# Patient Record
Sex: MALE | Race: BLACK OR AFRICAN AMERICAN | Employment: PART TIME | URBAN - METROPOLITAN AREA
[De-identification: names, ages, dates, MRNs, and addresses within clinical notes are randomized per-mention and may not be internally consistent; named-entity substitution may affect disease eponyms.]

---

## 2018-09-30 ENCOUNTER — APPOINTMENT (OUTPATIENT)
Dept: GENERAL RADIOLOGY | Age: 38
End: 2018-09-30
Attending: EMERGENCY MEDICINE
Payer: SUBSIDIZED

## 2018-09-30 ENCOUNTER — HOSPITAL ENCOUNTER (EMERGENCY)
Age: 38
Discharge: HOME | End: 2018-09-30
Attending: EMERGENCY MEDICINE
Payer: SUBSIDIZED

## 2018-09-30 VITALS
HEIGHT: 76 IN | DIASTOLIC BLOOD PRESSURE: 94 MMHG | OXYGEN SATURATION: 99 % | BODY MASS INDEX: 29.22 KG/M2 | TEMPERATURE: 98.3 F | WEIGHT: 240 LBS | SYSTOLIC BLOOD PRESSURE: 159 MMHG | RESPIRATION RATE: 20 BRPM | HEART RATE: 94 BPM

## 2018-09-30 DIAGNOSIS — R53.83 MALAISE AND FATIGUE: ICD-10-CM

## 2018-09-30 DIAGNOSIS — E86.0 DEHYDRATION: Primary | ICD-10-CM

## 2018-09-30 DIAGNOSIS — R53.81 MALAISE AND FATIGUE: ICD-10-CM

## 2018-09-30 LAB
ALBUMIN SERPL-MCNC: 3.5 G/DL (ref 3.5–5)
ALBUMIN/GLOB SERPL: 0.8 {RATIO} (ref 1.1–2.2)
ALP SERPL-CCNC: 104 U/L (ref 45–117)
ALT SERPL-CCNC: 19 U/L (ref 12–78)
ANION GAP SERPL CALC-SCNC: 7 MMOL/L (ref 5–15)
APPEARANCE UR: CLEAR
AST SERPL-CCNC: 8 U/L (ref 15–37)
BACTERIA URNS QL MICRO: NEGATIVE /HPF
BASOPHILS # BLD: 0 K/UL (ref 0–0.1)
BASOPHILS NFR BLD: 0 % (ref 0–1)
BILIRUB SERPL-MCNC: 1.4 MG/DL (ref 0.2–1)
BILIRUB UR QL: NEGATIVE
BUN SERPL-MCNC: 3 MG/DL (ref 6–20)
BUN/CREAT SERPL: 4 (ref 12–20)
CALCIUM SERPL-MCNC: 8.7 MG/DL (ref 8.5–10.1)
CHLORIDE SERPL-SCNC: 102 MMOL/L (ref 97–108)
CO2 SERPL-SCNC: 25 MMOL/L (ref 21–32)
COLOR UR: ABNORMAL
CREAT SERPL-MCNC: 0.71 MG/DL (ref 0.7–1.3)
DIFFERENTIAL METHOD BLD: ABNORMAL
EOSINOPHIL # BLD: 0.5 K/UL (ref 0–0.4)
EOSINOPHIL NFR BLD: 4 % (ref 0–7)
EPITH CASTS URNS QL MICRO: NORMAL /LPF
ERYTHROCYTE [DISTWIDTH] IN BLOOD BY AUTOMATED COUNT: 12.4 % (ref 11.5–14.5)
FLUAV AG NPH QL IA: NEGATIVE
FLUBV AG NOSE QL IA: NEGATIVE
GLOBULIN SER CALC-MCNC: 4.6 G/DL (ref 2–4)
GLUCOSE SERPL-MCNC: 184 MG/DL (ref 65–100)
GLUCOSE UR STRIP.AUTO-MCNC: NEGATIVE MG/DL
HCT VFR BLD AUTO: 48.1 % (ref 36.6–50.3)
HGB BLD-MCNC: 16.6 G/DL (ref 12.1–17)
HGB UR QL STRIP: NEGATIVE
IMM GRANULOCYTES # BLD: 0.1 K/UL (ref 0–0.04)
IMM GRANULOCYTES NFR BLD AUTO: 0 % (ref 0–0.5)
KETONES UR QL STRIP.AUTO: NEGATIVE MG/DL
LACTATE SERPL-SCNC: 0.6 MMOL/L (ref 0.4–2)
LEUKOCYTE ESTERASE UR QL STRIP.AUTO: ABNORMAL
LYMPHOCYTES # BLD: 1.6 K/UL (ref 0.8–3.5)
LYMPHOCYTES NFR BLD: 13 % (ref 12–49)
MCH RBC QN AUTO: 30.5 PG (ref 26–34)
MCHC RBC AUTO-ENTMCNC: 34.5 G/DL (ref 30–36.5)
MCV RBC AUTO: 88.3 FL (ref 80–99)
MONOCYTES # BLD: 0.9 K/UL (ref 0–1)
MONOCYTES NFR BLD: 7 % (ref 5–13)
NEUTS SEG # BLD: 9.2 K/UL (ref 1.8–8)
NEUTS SEG NFR BLD: 75 % (ref 32–75)
NITRITE UR QL STRIP.AUTO: NEGATIVE
NRBC # BLD: 0 K/UL (ref 0–0.01)
NRBC BLD-RTO: 0 PER 100 WBC
PH UR STRIP: 6 [PH] (ref 5–8)
PLATELET # BLD AUTO: 214 K/UL (ref 150–400)
PMV BLD AUTO: 11.2 FL (ref 8.9–12.9)
POTASSIUM SERPL-SCNC: 3.7 MMOL/L (ref 3.5–5.1)
PROT SERPL-MCNC: 8.1 G/DL (ref 6.4–8.2)
PROT UR STRIP-MCNC: NEGATIVE MG/DL
RBC # BLD AUTO: 5.45 M/UL (ref 4.1–5.7)
RBC #/AREA URNS HPF: NORMAL /HPF (ref 0–5)
SODIUM SERPL-SCNC: 134 MMOL/L (ref 136–145)
SP GR UR REFRACTOMETRY: <1.005 (ref 1–1.03)
TROPONIN I BLD-MCNC: <0.04 NG/ML (ref 0–0.08)
UROBILINOGEN UR QL STRIP.AUTO: 0.2 EU/DL (ref 0.2–1)
WBC # BLD AUTO: 12.3 K/UL (ref 4.1–11.1)
WBC URNS QL MICRO: NORMAL /HPF (ref 0–4)

## 2018-09-30 PROCEDURE — 71045 X-RAY EXAM CHEST 1 VIEW: CPT

## 2018-09-30 PROCEDURE — 36415 COLL VENOUS BLD VENIPUNCTURE: CPT | Performed by: EMERGENCY MEDICINE

## 2018-09-30 PROCEDURE — 87040 BLOOD CULTURE FOR BACTERIA: CPT | Performed by: EMERGENCY MEDICINE

## 2018-09-30 PROCEDURE — 87804 INFLUENZA ASSAY W/OPTIC: CPT | Performed by: EMERGENCY MEDICINE

## 2018-09-30 PROCEDURE — 99284 EMERGENCY DEPT VISIT MOD MDM: CPT

## 2018-09-30 PROCEDURE — 85025 COMPLETE CBC W/AUTO DIFF WBC: CPT | Performed by: EMERGENCY MEDICINE

## 2018-09-30 PROCEDURE — 84484 ASSAY OF TROPONIN QUANT: CPT

## 2018-09-30 PROCEDURE — 83605 ASSAY OF LACTIC ACID: CPT | Performed by: EMERGENCY MEDICINE

## 2018-09-30 PROCEDURE — 74011250636 HC RX REV CODE- 250/636: Performed by: EMERGENCY MEDICINE

## 2018-09-30 PROCEDURE — 81001 URINALYSIS AUTO W/SCOPE: CPT | Performed by: EMERGENCY MEDICINE

## 2018-09-30 PROCEDURE — 96361 HYDRATE IV INFUSION ADD-ON: CPT

## 2018-09-30 PROCEDURE — 74011250637 HC RX REV CODE- 250/637: Performed by: EMERGENCY MEDICINE

## 2018-09-30 PROCEDURE — 93005 ELECTROCARDIOGRAM TRACING: CPT

## 2018-09-30 PROCEDURE — 80053 COMPREHEN METABOLIC PANEL: CPT | Performed by: EMERGENCY MEDICINE

## 2018-09-30 PROCEDURE — 96360 HYDRATION IV INFUSION INIT: CPT

## 2018-09-30 RX ORDER — SODIUM CHLORIDE 0.9 % (FLUSH) 0.9 %
5-10 SYRINGE (ML) INJECTION AS NEEDED
Status: DISCONTINUED | OUTPATIENT
Start: 2018-09-30 | End: 2018-10-01 | Stop reason: HOSPADM

## 2018-09-30 RX ORDER — IBUPROFEN 400 MG/1
800 TABLET ORAL ONCE
Status: COMPLETED | OUTPATIENT
Start: 2018-09-30 | End: 2018-09-30

## 2018-09-30 RX ADMIN — IBUPROFEN 800 MG: 400 TABLET, FILM COATED ORAL at 19:31

## 2018-09-30 RX ADMIN — SODIUM CHLORIDE 1000 ML: 900 INJECTION, SOLUTION INTRAVENOUS at 21:15

## 2018-09-30 RX ADMIN — SODIUM CHLORIDE 1000 ML: 900 INJECTION, SOLUTION INTRAVENOUS at 19:40

## 2018-09-30 RX ADMIN — SODIUM CHLORIDE 1000 ML: 900 INJECTION, SOLUTION INTRAVENOUS at 22:09

## 2018-09-30 NOTE — ED NOTES
Pt presents ambulatory to ED complaining of generalized body aches and chills since Saturday morning. Pt states he had a nose bleed last night \"for 40 mins\" and got dizzy after the bleeding stopped. Pt states he has had productive cough with brown and green sputum. Pt reports feeling SOB. PT states he feels like he as been wheezing. Pt denies N/V/D. Pt states he did not take temperature at home but he said he felt \"warm. Pt states he has only \"eaten like twice since Thursday\". Pt states he took [employer] brand of Sudafed, last dose was at 1400 yesterday. Pt states he does not take medication for high blood pressure. Pt is alert and oriented x 4, RR even and unlabored, skin is warm and dry. Assesment completed and pt updated on plan of care. Emergency Department Nursing Plan of Care The Nursing Plan of Care is developed from the Nursing assessment and Emergency Department Attending provider initial evaluation. The plan of care may be reviewed in the ED Provider note. The Plan of Care was developed with the following considerations:  
Patient / Family readiness to learn indicated by:verbalized understanding Persons(s) to be included in education: patient Barriers to Learning/Limitations:No 
 
Signed Lisa Calderon   
9/30/2018   7:06 PM

## 2018-09-30 NOTE — ED PROVIDER NOTES
EMERGENCY DEPARTMENT HISTORY AND PHYSICAL EXAM 
 
 
Date: 9/30/2018 Patient Name: Laura Guillermo History of Presenting Illness Chief Complaint Patient presents with  Chills Pt c/o cold chills, generalize body aches for two days and a nose bleed for 40 minutes History Provided By: Patient HPI: Laura Guillermo, 45 y.o. male with PMHx significant for HTN and asthma who presents ambulatory to the ED with cc of gradually worsening generalized body ache that onset yesterday morning. He reports associated generalized weakness, chills, subjective fevers, neck pain and back pain. Pt reports taking mucin ex and pseudoephedrine with little relief of his symptoms. He denies any recent sick contacts. Pt reports decreased PO intake since prior to the onset of his symptoms. He states that he is not compliant with his antihypertensive medications. Pt denies any cough, sore throat, ear pain, CP, SOB, HA, nausea, or vomiting. There are no other complaints, changes, or physical findings at this time. PCP: None Past History Past Medical History: 
Past Medical History:  
Diagnosis Date  Asthma  Hypertension Past Surgical History: 
Past Surgical History:  
Procedure Laterality Date  HX OTHER SURGICAL    
 hemorrhoids removed Family History: 
History reviewed. No pertinent family history. Social History: 
Social History Substance Use Topics  Smoking status: Current Every Day Smoker Packs/day: 0.25  Smokeless tobacco: None  Alcohol use No  
 
 
Allergies: 
No Known Allergies Review of Systems Review of Systems Constitutional: Positive for chills and fever. HENT: Negative for congestion, ear pain, rhinorrhea, sneezing and sore throat. Eyes: Negative for redness and visual disturbance. Respiratory: Negative for cough and shortness of breath. Cardiovascular: Negative for chest pain and leg swelling. Gastrointestinal: Negative for abdominal pain, nausea and vomiting. Genitourinary: Negative for difficulty urinating and frequency. Musculoskeletal: Positive for back pain, myalgias (generalized) and neck pain. Negative for neck stiffness. Skin: Negative for rash. Neurological: Positive for weakness (generalized). Negative for dizziness, syncope and headaches. Hematological: Negative for adenopathy. All other systems reviewed and are negative. Physical Exam  
Physical Exam  
Constitutional: He is oriented to person, place, and time. He appears well-developed and well-nourished. HENT:  
Head: Normocephalic and atraumatic. Nose: Nose normal.  
Mouth/Throat: Oropharynx is clear and moist.  
Eyes: Conjunctivae and EOM are normal. Pupils are equal, round, and reactive to light. Neck: Normal range of motion. Neck supple. Cardiovascular: Regular rhythm, normal heart sounds and intact distal pulses. Tachycardia present. Pulmonary/Chest: Effort normal and breath sounds normal.  
Abdominal: Soft. Bowel sounds are normal. He exhibits no distension. Musculoskeletal: Normal range of motion. He exhibits no edema. Neurological: He is alert and oriented to person, place, and time. He exhibits normal muscle tone. Skin: Skin is warm and dry. No erythema. Psychiatric: He has a normal mood and affect. His behavior is normal. Judgment and thought content normal.  
 
Diagnostic Study Results Labs - Recent Results (from the past 12 hour(s)) EKG, 12 LEAD, INITIAL Collection Time: 09/30/18  7:21 PM  
Result Value Ref Range Ventricular Rate 109 BPM  
 Atrial Rate 109 BPM  
 P-R Interval 138 ms QRS Duration 86 ms  
 Q-T Interval 328 ms QTC Calculation (Bezet) 441 ms Calculated P Axis 63 degrees Calculated R Axis 2 degrees Calculated T Axis 69 degrees Diagnosis Sinus tachycardia Otherwise normal ECG When compared with ECG of 04-AUG-2011 16:16, 
 Nonspecific T wave abnormality no longer evident in Inferior leads POC TROPONIN-I Collection Time: 09/30/18  7:34 PM  
Result Value Ref Range Troponin-I (POC) <0.04 0.00 - 0.08 ng/mL CBC WITH AUTOMATED DIFF Collection Time: 09/30/18  7:35 PM  
Result Value Ref Range WBC 12.3 (H) 4.1 - 11.1 K/uL  
 RBC 5.45 4.10 - 5.70 M/uL  
 HGB 16.6 12.1 - 17.0 g/dL HCT 48.1 36.6 - 50.3 % MCV 88.3 80.0 - 99.0 FL  
 MCH 30.5 26.0 - 34.0 PG  
 MCHC 34.5 30.0 - 36.5 g/dL  
 RDW 12.4 11.5 - 14.5 % PLATELET 786 054 - 450 K/uL MPV 11.2 8.9 - 12.9 FL  
 NRBC 0.0 0  WBC ABSOLUTE NRBC 0.00 0.00 - 0.01 K/uL NEUTROPHILS 75 32 - 75 % LYMPHOCYTES 13 12 - 49 % MONOCYTES 7 5 - 13 % EOSINOPHILS 4 0 - 7 % BASOPHILS 0 0 - 1 % IMMATURE GRANULOCYTES 0 0.0 - 0.5 % ABS. NEUTROPHILS 9.2 (H) 1.8 - 8.0 K/UL  
 ABS. LYMPHOCYTES 1.6 0.8 - 3.5 K/UL  
 ABS. MONOCYTES 0.9 0.0 - 1.0 K/UL  
 ABS. EOSINOPHILS 0.5 (H) 0.0 - 0.4 K/UL  
 ABS. BASOPHILS 0.0 0.0 - 0.1 K/UL  
 ABS. IMM. GRANS. 0.1 (H) 0.00 - 0.04 K/UL  
 DF AUTOMATED METABOLIC PANEL, COMPREHENSIVE Collection Time: 09/30/18  7:35 PM  
Result Value Ref Range Sodium 134 (L) 136 - 145 mmol/L Potassium 3.7 3.5 - 5.1 mmol/L Chloride 102 97 - 108 mmol/L  
 CO2 25 21 - 32 mmol/L Anion gap 7 5 - 15 mmol/L Glucose 184 (H) 65 - 100 mg/dL BUN 3 (L) 6 - 20 MG/DL Creatinine 0.71 0.70 - 1.30 MG/DL  
 BUN/Creatinine ratio 4 (L) 12 - 20 GFR est AA >60 >60 ml/min/1.73m2 GFR est non-AA >60 >60 ml/min/1.73m2 Calcium 8.7 8.5 - 10.1 MG/DL Bilirubin, total 1.4 (H) 0.2 - 1.0 MG/DL  
 ALT (SGPT) 19 12 - 78 U/L  
 AST (SGOT) 8 (L) 15 - 37 U/L Alk. phosphatase 104 45 - 117 U/L Protein, total 8.1 6.4 - 8.2 g/dL Albumin 3.5 3.5 - 5.0 g/dL Globulin 4.6 (H) 2.0 - 4.0 g/dL A-G Ratio 0.8 (L) 1.1 - 2.2 INFLUENZA A & B AG (RAPID TEST) Collection Time: 09/30/18  7:35 PM  
Result Value Ref Range Influenza A Antigen NEGATIVE  NEG Influenza B Antigen NEGATIVE  NEG    
LACTIC ACID Collection Time: 09/30/18  8:59 PM  
Result Value Ref Range Lactic acid 0.6 0.4 - 2.0 MMOL/L  
URINALYSIS W/ RFLX MICROSCOPIC Collection Time: 09/30/18 10:00 PM  
Result Value Ref Range Color YELLOW/STRAW Appearance CLEAR CLEAR Specific gravity <1.005 1.003 - 1.030  
 pH (UA) 6.0 5.0 - 8.0 Protein NEGATIVE  NEG mg/dL Glucose NEGATIVE  NEG mg/dL Ketone NEGATIVE  NEG mg/dL Bilirubin NEGATIVE  NEG Blood NEGATIVE  NEG Urobilinogen 0.2 0.2 - 1.0 EU/dL Nitrites NEGATIVE  NEG Leukocyte Esterase TRACE (A) NEG URINE MICROSCOPIC ONLY Collection Time: 09/30/18 10:00 PM  
Result Value Ref Range WBC 0-4 0 - 4 /hpf  
 RBC 0-5 0 - 5 /hpf Epithelial cells FEW FEW /lpf Bacteria NEGATIVE  NEG /hpf Radiologic Studies - CXR Results  (Last 48 hours) 09/30/18 2051  XR CHEST PORT Final result Impression:  IMPRESSION: No evidence of acute cardiopulmonary process. Narrative:  INDICATION:  Sepsis COMPARISON: 8/4/2011 FINDINGS: Single AP portable view of the chest obtained at 2045 demonstrates a  
stable cardiomediastinal silhouette. The lungs are clear bilaterally. No osseous  
abnormalities are seen. Medical Decision Making I am the first provider for this patient. I reviewed the vital signs, available nursing notes, past medical history, past surgical history, family history and social history. Vital Signs-Reviewed the patient's vital signs. Patient Vitals for the past 12 hrs: 
 Temp Pulse Resp BP SpO2  
09/30/18 2200 - - - (!) 159/94 99 % 09/30/18 2100 - - - 139/87 96 % 09/30/18 1947 - 94 20 (!) 130/115 99 % 09/30/18 1858 98.3 °F (36.8 °C) (!) 124 20 (!) 148/105 95 % EKG interpretation: (Preliminary) 1921 Rhythm: sinus tachycardia; and regular . Rate (approx.): 109;  Axis: normal; CA interval: normal; QRS interval: normal ; ST/T wave: normal; Other findings: abnormal ekg. Written by Francisca Parkinson, ED Scribe, as dictated by Sabas Sheppard MD. Records Reviewed: Nursing Notes and Old Medical Records Provider Notes (Medical Decision Making): Influenza, URI, dehydration ED Course:  
Initial assessment performed. The patients presenting problems have been discussed, and they are in agreement with the care plan formulated and outlined with them. I have encouraged them to ask questions as they arise throughout their visit. Disposition: 
 
DISCHARGE NOTE 
10:32 PM 
The patient has been re-evaluated and is ready for discharge. Reviewed available results with patient. Counseled patient on diagnosis and care plan. Patient has expressed understanding, and all questions have been answered. Patient agrees with plan and agrees to follow up as recommended, or return to the ED if their symptoms worsen. Discharge instructions have been provided and explained to the patient, along with reasons to return to the ED. PLAN: 
1. There are no discharge medications for this patient. 2.  
Follow-up Information Follow up With Details Comments Contact Info None Call  None [phone] Patient stated that they have no PCP 
  
 [facility] [place] EMERGENCY DEPT  As needed, If symptoms worsen [address] Return to ED if worse Diagnosis Clinical Impression: 1. Dehydration 2. Malaise and fatigue Attestations: This note is prepared by Francisca Parkinson, acting as Scribe for Sabas Sheppard MD. 
 
Sabas Sheppard MD: The scribe's documentation has been prepared under my direction and personally reviewed by me in its entirety. I confirm that the note above accurately reflects all work, treatment, procedures, and medical decision making performed by me.

## 2018-09-30 NOTE — LETTER
[facility] EMERGENCY DEPT 
[address] [facility] [address] [id number] [phone] Work/School Note Date: 9/30/2018 To Whom It May concern: 
 
Bharathi Hodge was seen and treated today in the emergency room by the following provider(s): 
Attending Provider: Vargas Vogt MD.   
 
Bharathi Hodge may return to work on 10/3/18. Sincerely, Vargas Vogt MD

## 2018-10-01 LAB
ATRIAL RATE: 109 BPM
CALCULATED P AXIS, ECG09: 63 DEGREES
CALCULATED R AXIS, ECG10: 2 DEGREES
CALCULATED T AXIS, ECG11: 69 DEGREES
DIAGNOSIS, 93000: NORMAL
P-R INTERVAL, ECG05: 138 MS
Q-T INTERVAL, ECG07: 328 MS
QRS DURATION, ECG06: 86 MS
QTC CALCULATION (BEZET), ECG08: 441 MS
VENTRICULAR RATE, ECG03: 109 BPM

## 2018-10-01 NOTE — DISCHARGE INSTRUCTIONS
Dehydration: Care Instructions  Your Care Instructions  Dehydration happens when your body loses too much fluid. This might happen when you do not drink enough water or you lose large amounts of fluids from your body because of diarrhea, vomiting, or sweating. Severe dehydration can be life-threatening. Water and minerals called electrolytes help put your body fluids back in balance. Learn the early signs of fluid loss, and drink more fluids to prevent dehydration. Follow-up care is a key part of your treatment and safety. Be sure to make and go to all appointments, and call your doctor if you are having problems. It's also a good idea to know your test results and keep a list of the medicines you take. How can you care for yourself at home? · To prevent dehydration, drink plenty of fluids, enough so that your urine is light yellow or clear like water. Choose water and other caffeine-free clear liquids until you feel better. If you have kidney, heart, or liver disease and have to limit fluids, talk with your doctor before you increase the amount of fluids you drink. · If you do not feel like eating or drinking, try taking small sips of water, sports drinks, or other rehydration drinks. · Get plenty of rest.  To prevent dehydration  · Add more fluids to your diet and daily routine, unless your doctor has told you not to. · During hot weather, drink more fluids. Drink even more fluids if you exercise a lot. Stay away from drinks with alcohol or caffeine. · Watch for the symptoms of dehydration. These include:  ¨ A dry, sticky mouth. ¨ Dark yellow urine, and not much of it. ¨ Dry and sunken eyes. ¨ Feeling very tired. · Learn what problems can lead to dehydration. These include:  ¨ Diarrhea, fever, and vomiting. ¨ Any illness with a fever, such as pneumonia or the flu. ¨ Activities that cause heavy sweating, such as endurance races and heavy outdoor work in hot or humid weather.   ¨ Alcohol or drug abuse or withdrawal.  ¨ Certain medicines, such as cold and allergy pills (antihistamines), diet pills (diuretics), and laxatives. ¨ Certain diseases, such as diabetes, cancer, and heart or kidney disease. When should you call for help? Call 911 anytime you think you may need emergency care. For example, call if:    · You passed out (lost consciousness).    Call your doctor now or seek immediate medical care if:    · You are confused and cannot think clearly.     · You are dizzy or lightheaded, or you feel like you may faint.     · You have signs of needing more fluids. You have sunken eyes and a dry mouth, and you pass only a little dark urine.     · You cannot keep fluids down.    Watch closely for changes in your health, and be sure to contact your doctor if:    · You are not making tears.     · Your skin is very dry and sags slowly back into place after you pinch it.     · Your mouth and eyes are very dry. Where can you learn more? Go to http://juliann-julia.info/. Enter Y856 in the search box to learn more about \"Dehydration: Care Instructions. \"  Current as of: November 20, 2017  Content Version: 11.7  © 7820-3420 [employer]. Care instructions adapted under license by [employer] (which disclaims liability or warranty for this information). If you have questions about a medical condition or this instruction, always ask your healthcare professional. [place] [address] any warranty or liability for your use of this information. Fatigue: Care Instructions  Your Care Instructions    Fatigue is a feeling of tiredness, exhaustion, or lack of energy. You may feel fatigue because of too much or not enough activity. It can also come from stress, lack of sleep, boredom, and poor diet. Many medical problems, such as viral infections, can cause fatigue. Emotional problems, especially depression, are often the cause of fatigue.   Fatigue is most often a symptom of another problem. Treatment for fatigue depends on the cause. For example, if you have fatigue because you have a certain health problem, treating this problem also treats your fatigue. If depression or anxiety is the cause, treatment may help. Follow-up care is a key part of your treatment and safety. Be sure to make and go to all appointments, and call your doctor if you are having problems. It's also a good idea to know your test results and keep a list of the medicines you take. How can you care for yourself at home? · Get regular exercise. But don't overdo it. Go back and forth between rest and exercise. · Get plenty of rest.  · Eat a healthy diet. Do not skip meals, especially breakfast.  · Reduce your use of caffeine, tobacco, and alcohol. Caffeine is most often found in coffee, tea, cola drinks, and chocolate. · Limit medicines that can cause fatigue. This includes tranquilizers and cold and allergy medicines. When should you call for help? Watch closely for changes in your health, and be sure to contact your doctor if:    · You have new symptoms such as fever or a rash.     · Your fatigue gets worse.     · You have been feeling down, depressed, or hopeless. Or you may have lost interest in things that you usually enjoy.     · You are not getting better as expected. Where can you learn more? Go to http://juliann-julia.info/. Enter G449 in the search box to learn more about \"Fatigue: Care Instructions. \"  Current as of: November 20, 2017  Content Version: 11.7  © 5624-1841 [employer]. Care instructions adapted under license by [employer] (which disclaims liability or warranty for this information). If you have questions about a medical condition or this instruction, always ask your healthcare professional. [address] any warranty or liability for your use of this information.

## 2018-10-01 NOTE — ED NOTES
Discharge instructions were given to the patient by Debbie Patterson.  
 
The patient left the Emergency Department ambulatory, alert and oriented and in no acute distress with 0 prescriptions. The patient was encouraged to call or return to the ED for worsening issues or problems and was encouraged to schedule a follow up appointment for continuing care. The patient verbalized understanding of discharge instructions and prescriptions, all questions were answered. The patient has no further concerns at this time.

## 2018-10-06 LAB
BACTERIA SPEC CULT: NORMAL
BACTERIA SPEC CULT: NORMAL
SERVICE CMNT-IMP: NORMAL
SERVICE CMNT-IMP: NORMAL

## 2018-12-13 ENCOUNTER — HOSPITAL ENCOUNTER (EMERGENCY)
Age: 38
Discharge: HOME | End: 2018-12-13
Attending: EMERGENCY MEDICINE
Payer: SUBSIDIZED

## 2018-12-13 VITALS
DIASTOLIC BLOOD PRESSURE: 89 MMHG | WEIGHT: 245 LBS | OXYGEN SATURATION: 100 % | BODY MASS INDEX: 29.83 KG/M2 | TEMPERATURE: 98.6 F | HEIGHT: 76 IN | SYSTOLIC BLOOD PRESSURE: 162 MMHG | HEART RATE: 91 BPM | RESPIRATION RATE: 18 BRPM

## 2018-12-13 DIAGNOSIS — L73.8 FOLLICULITIS BARBAE: Primary | ICD-10-CM

## 2018-12-13 PROCEDURE — 99282 EMERGENCY DEPT VISIT SF MDM: CPT

## 2018-12-13 RX ORDER — CEPHALEXIN 500 MG/1
500 CAPSULE ORAL 4 TIMES DAILY
Qty: 40 CAP | Refills: 0 | Status: SHIPPED | OUTPATIENT
Start: 2018-12-13 | End: 2018-12-23

## 2018-12-13 RX ORDER — SULFAMETHOXAZOLE AND TRIMETHOPRIM 800; 160 MG/1; MG/1
2 TABLET ORAL 2 TIMES DAILY
Qty: 40 TAB | Refills: 0 | Status: SHIPPED | OUTPATIENT
Start: 2018-12-13 | End: 2018-12-23

## 2018-12-14 NOTE — DISCHARGE INSTRUCTIONS
[facility] SYSTEMS Departments     For adult and child immunizations, family planning, TB screening, STD testing and women's health services. [facility]: [place] [phone]      [facility] 25   657 [facility]   [address]   170 [facility] [employer] [address] [phone]      [address]          [address] [place] [address]     For primary care services, woman and child wellness, and some clinics providing specialty care. [facility] -- [address] [facility] 2525 [facility] [phone]   411 [facility] 200 [facility] 3614 [facility] [phone]   339 [facility] [address] [phone]   [address] [place] [address] 5850 [facility]  [phone]   7700 [facility] [address] [place] [phone]   [facility] 81 [facility] [phone]   [facility] 1051 [facility] [phone]   [facility] 700 brian Gibbons [address] [place] [address], [address] [phone]     [address] [phone]   [facility] 5850 [facility]  [phone]   [employer]  [address] ([email or web address]) [phone]         Sexual Health/Woman Wellness Clinics    For STD/HIV testing and treatment, pregnancy testing and services, men's health, birth control services, LGBT services, and hepatitis/HPV vaccine services. [place] [facility] [place] [facility] [place] [employer] [address] [facility] 75 [facility] [address] [facility] 1579 600 EShae Landeros [phone]   [facility] [address], 5th floor [phone]   Pregnancy 3928 [facility] [address] for Women 118 FRANSICO Pritchett [facility] [phone]         Specialty Service 1700 [facility]   [phone]   [place]   [phone]   Women, Infant and Children's Services: [address] [phone] 600 Critical access hospital   [phone]   [address] 7654 [facility]     [phone]   [address] Crisis   [address] [facility] [address] [phone]     Local Primary Care Physicians  [facility] [phone]  MD Donna Allen MD Janette Iles, MD [facility] Doctors [phone]  Derick Montero, [facility]  MD Anya Fuentes MD Kay Battiest, MD [address] [address] [phone]  Blinda [place]pley[place] [address] [facility] [phone]  MD Sajan Montoya MD Jaylene Pimple, MD Bonnetta Hartshorn, MD   [facility] [phone]  [facility] MD Lucretia TORRES MD Lyndee Budd, NP [address] [phone]  Lamar Loyal, MD Wilkie Riling, MD Earlene Larger, MD Corinne Giovanni, MD Secundino Prows, MD Hayward Nelson, MD Francesco Konig, MD   33 57 [facility]  Peggy Cano MD [address] [phone]  Keshawn Rosita, MD Vivian Vu, NP  Adeline Spencer, MD Jose Moise MD Bertie Parr, MD Darice Pair, MD   8051 [facility] [phone]  MD Krys Galarza, P  Irvin Vaughn, NP  MD Lillie Hammonds MD Gemma Mutton, MD Dana Lam, MD [facility] [phone]  MD Gallo Villarreal MD Harrie Curtis, MD Lucy Walker MD   [address] [phone]  MD Diana Johnson MD [place] [phone]  Lazarus Printers, MD Berry Griffin, MD Rory Ch[place] Marvin Ruiz, [address] [facility]icians [phone]  MD Caatrino Josue, MD Zeke Gomes MD Olita Matte, MD Lavella Precise, ROBIN Chakraborty MD 1619 [facility]   [phone]  MD Lebron Benton MD Marcel Klein, MD   2102 [facility] [phone]  MD Yelena Thomas, [facility]  Rylee Montes, VINCENT Montes, VINCENT Aponte MD Rosamond Grays, ROBIN Zaidi, DO Miscellaneous:  Rochelle Todd [phone]            Folliculitis: Care Instructions  Your Care Instructions    Folliculitis (say \"uqo-PMD-pdh-LY-tus\") is an infection of the pouches (follicles) in the skin where hair grows. It can occur on any part of the body, but it is most common on the scalp, face, armpits, and groin. Bacteria, such as those found in a hot tub, can cause folliculitis. Folliculitis begins as a red, tender area near a strand of hair. The skin can itch or burn and may drain pus or blood. Sometimes folliculitis can lead to more serious skin infections. Your doctor usually can treat mild folliculitis with an antibiotic cream or ointment. If you have folliculitis on your scalp, you may use a shampoo that kills bacteria. Antibiotics you take as pills can treat infections deeper in the skin. For stubborn cases of folliculitis, laser treatment may be an option. Laser treatment uses strong beams of light to destroy the hair follicle. But hair will no longer grow in the treated area. Follow-up care is a key part of your treatment and safety. Be sure to make and go to all appointments, and call your doctor if you are having problems. It's also a good idea to know your test results and keep a list of the medicines you take. How can you care for yourself at home? · Take your medicine exactly as prescribed.  If your doctor prescribed antibiotics, take them as directed. Do not stop taking them just because you feel better. You need to take the full course of antibiotics. · Use a soap that kills bacteria to wash the infected area. If your scalp or beard is infected, use a shampoo with selenium or propylene glycol. Be careful. Do not scrub too long or too hard. · Mix 1 1/3 cup warm water and 1 tablespoon vinegar. Soak a cloth in the mixture, and place it over the infected skin until it cools off (usually 5 to 10 minutes). You can do this 3 to 6 times a day. · Do not share your razor, towel, or washcloth. That can spread folliculitis. · Use a new blade in your razor each time you shave to keep from re-infecting your skin. · If you tend to get folliculitis, avoid using hot tubs. They can contain bacteria that cause folliculitis. When should you call for help? Call your doctor now or seek immediate medical care if:    · You have symptoms of infection, such as:  ? Increased pain, swelling, warmth, or redness. ? Red streaks leading from the area. ? Pus draining from the area. ? A fever.    Watch closely for changes in your health, and be sure to contact your doctor if:    · You do not get better as expected. Where can you learn more? Go to http://juliann-julia.info/. Enter M257 in the search box to learn more about \"Folliculitis: Care Instructions. \"  Current as of: April 18, 2018  Content Version: 11.8  © 3300-4936 [employer]. Care instructions adapted under license by [employer] (which disclaims liability or warranty for this information). If you have questions about a medical condition or this instruction, always ask your healthcare professional. [address] any warranty or liability for your use of this information.

## 2018-12-14 NOTE — ED NOTES
Pt arrived to ED with c/o facial rash x 1 week. Pt presents with itchy bumps with a drainage to chin, beard, and back of scalp. Pt is in no acute distress. Will continue to monitor. See nursing assessment. Safety precautions in place; call light within reach. Emergency Department Nursing Plan of Care       The Nursing Plan of Care is developed from the Nursing assessment and Emergency Department Attending provider initial evaluation. The plan of care may be reviewed in the ED Provider note.     The Plan of Care was developed with the following considerations:   Patient / Family readiness to learn indicated by:verbalized understanding  Persons(s) to be included in education: patient  Barriers to Learning/Limitations:No    Signed     Jessi Coon RN    12/13/2018   10:16 PM

## 2018-12-14 NOTE — ED PROVIDER NOTES
EMERGENCY DEPARTMENT HISTORY AND PHYSICAL EXAM      Date: 12/13/2018  Patient Name: Joyce Zhou    History of Presenting Illness     Chief Complaint   Patient presents with    Rash     facial x 1 week       History Provided By: Patient    HPI: Joyce Zhou, 45 y.o. male with PMHx significant for diabetes, HTN, asthma, presents ambulatory to the ED with cc of multiple rashes to chin, scalp of head, and posterior neck with associated pruritic sensation and drainage for 5 days. Pt denies any recent shaving to face or scalp. He states rash is \"crusty\" in appearance, and tends to drain at night. He mentions applying clinical shampoo to rash area with no signs of relief. He denies any recent evaluation by PCP. He denies any exacerbating and alleviating factors. He specifically denies any signs of fever, chills, n/v/d, back pain, abdominal pain, dysuria, HA, weakness, and any other associated symptoms. There are no other complaints, changes, or physical findings at this time. PCP: None        Past History     Past Medical History:  Past Medical History:   Diagnosis Date    Asthma     Diabetes ([address]     Hypertension        Past Surgical History:  Past Surgical History:   Procedure Laterality Date    HX OTHER SURGICAL      hemorrhoids removed       Family History:  History reviewed. No pertinent family history. Social History:  Social History     Tobacco Use    Smoking status: Current Every Day Smoker     Packs/day: 0.25   Substance Use Topics    Alcohol use: No    Drug use: No       Allergies:  No Known Allergies      Review of Systems   Review of Systems   Constitutional: Negative. Negative for fever. HENT: Negative. Negative for drooling, facial swelling and trouble swallowing. Eyes: Negative. Negative for discharge and redness. Respiratory: Negative. Negative for chest tightness, shortness of breath and wheezing. Cardiovascular: Negative. Negative for chest pain.    Gastrointestinal: Negative. Negative for abdominal distention, abdominal pain, constipation, diarrhea, nausea and vomiting. Endocrine: Negative. Genitourinary: Negative. Negative for difficulty urinating and dysuria. Musculoskeletal: Negative. Negative for arthralgias and myalgias. Skin: Positive for rash. Negative for color change. +drainage  +pruritic sensation    Allergic/Immunologic: Negative. Neurological: Negative. Negative for syncope, facial asymmetry and speech difficulty. Hematological: Negative. Psychiatric/Behavioral: Negative. Negative for agitation and confusion. All other systems reviewed and are negative. Physical Exam   Physical Exam   Constitutional: He is oriented to person, place, and time. He appears well-developed and well-nourished. HENT:   Head: Normocephalic and atraumatic. Eyes: Conjunctivae and EOM are normal.   Neck: Neck supple. Cardiovascular: Normal rate, regular rhythm and intact distal pulses. Pulmonary/Chest: No accessory muscle usage. No respiratory distress. Abdominal: Soft. Normal appearance. There is no tenderness. Musculoskeletal: Normal range of motion. Neurological: He is alert and oriented to person, place, and time. Skin: Skin is warm and dry. Lesion and rash noted. Rash is maculopapular. There is erythema. Crusted weeping lesions in beard and under chin. Multiple mild erythema maculopapular lesions throughout scalp near hair follicles. Psychiatric: He has a normal mood and affect. His behavior is normal. Thought content normal.   Nursing note and vitals reviewed. Diagnostic Study Results     Labs -   No results found for this or any previous visit (from the past 12 hour(s)). Radiologic Studies -   None    Medical Decision Making   I am the first provider for this patient. I reviewed the vital signs, available nursing notes, past medical history, past surgical history, family history and social history.     Vital Signs-Reviewed the patient's vital signs. Patient Vitals for the past 12 hrs:   Temp Pulse Resp BP SpO2   12/13/18 2142 98.6 °F (37 °C) 91 18 162/89 100 %       Pulse Oximetry Analysis - 100% on RA    Records Reviewed: Nursing Notes and Old Medical Records    Provider Notes (Medical Decision Making):   DDx: folliculitis, impetigo, tenia capitis    ED Course:   Initial assessment performed. The patients presenting problems have been discussed, and they are in agreement with the care plan formulated and outlined with them. I have encouraged them to ask questions as they arise throughout their visit. Critical Care Time:   0 minutes    Disposition:  Discharge Note:  10:30 PM  The pt is ready for discharge. The pt's signs, symptoms, diagnosis, and discharge instructions have been discussed and pt has conveyed their understanding. The pt is to follow up as recommended or return to ER should their symptoms worsen. Plan has been discussed and pt is in agreement. PLAN:  1. Current Discharge Medication List      START taking these medications    Details   trimethoprim-sulfamethoxazole (BACTRIM DS) 160-800 mg per tablet Take 2 Tabs by mouth two (2) times a day. Qty: 40 Tab, Refills: 0      cephALEXin (KEFLEX) 500 mg capsule Take 1 Cap by mouth four (4) times daily for 10 days. Qty: 40 Cap, Refills: 0           2. Follow-up Information     Follow up With Specialties Details Why [address] [facility]  Schedule an appointment as soon as possible for a visit  [address] [facility] [address]    [facility] EMERGENCY DEPT Emergency Medicine  As needed, If symptoms worsen [facility]  [phone]        Return to ED if worse     Diagnosis     Clinical Impression:   1. Folliculitis barbae        Attestations:     This note is prepared by Ruben Barrett, acting as Scribe for Johnny Calix MD.    Johnny Calix MD: The scribe's documentation has been prepared under my direction and personally reviewed by me in its entirety.  I confirm that the note above accurately reflects all work, treatment, procedures, and medical decision making performed by me

## 2018-12-23 ENCOUNTER — HOSPITAL ENCOUNTER (EMERGENCY)
Age: 38
Discharge: HOME | End: 2018-12-23
Attending: EMERGENCY MEDICINE
Payer: SUBSIDIZED

## 2018-12-23 VITALS
DIASTOLIC BLOOD PRESSURE: 90 MMHG | WEIGHT: 250 LBS | TEMPERATURE: 99.2 F | HEIGHT: 76 IN | RESPIRATION RATE: 16 BRPM | OXYGEN SATURATION: 100 % | SYSTOLIC BLOOD PRESSURE: 136 MMHG | BODY MASS INDEX: 30.44 KG/M2 | HEART RATE: 81 BPM

## 2018-12-23 DIAGNOSIS — L30.9 DERMATITIS: Primary | ICD-10-CM

## 2018-12-23 LAB
ALBUMIN SERPL-MCNC: 3.4 G/DL (ref 3.5–5)
ALBUMIN/GLOB SERPL: 0.8 {RATIO} (ref 1.1–2.2)
ALP SERPL-CCNC: 88 U/L (ref 45–117)
ALT SERPL-CCNC: 24 U/L (ref 12–78)
ANION GAP SERPL CALC-SCNC: 10 MMOL/L (ref 5–15)
AST SERPL-CCNC: 14 U/L (ref 15–37)
ATRIAL RATE: 91 BPM
BASOPHILS # BLD: 0 K/UL (ref 0–0.1)
BASOPHILS NFR BLD: 1 % (ref 0–1)
BILIRUB SERPL-MCNC: 0.3 MG/DL (ref 0.2–1)
BUN SERPL-MCNC: 8 MG/DL (ref 6–20)
BUN/CREAT SERPL: 8 (ref 12–20)
CALCIUM SERPL-MCNC: 8.4 MG/DL (ref 8.5–10.1)
CALCULATED P AXIS, ECG09: 64 DEGREES
CALCULATED R AXIS, ECG10: 24 DEGREES
CALCULATED T AXIS, ECG11: 58 DEGREES
CHLORIDE SERPL-SCNC: 96 MMOL/L (ref 97–108)
CO2 SERPL-SCNC: 25 MMOL/L (ref 21–32)
CREAT SERPL-MCNC: 1.02 MG/DL (ref 0.7–1.3)
DIAGNOSIS, 93000: NORMAL
DIFFERENTIAL METHOD BLD: ABNORMAL
EOSINOPHIL # BLD: 0.1 K/UL (ref 0–0.4)
EOSINOPHIL NFR BLD: 2 % (ref 0–7)
ERYTHROCYTE [DISTWIDTH] IN BLOOD BY AUTOMATED COUNT: 12.6 % (ref 11.5–14.5)
ERYTHROCYTE [SEDIMENTATION RATE] IN BLOOD: 6 MM/HR (ref 0–15)
GLOBULIN SER CALC-MCNC: 4.1 G/DL (ref 2–4)
GLUCOSE SERPL-MCNC: 272 MG/DL (ref 65–100)
HCT VFR BLD AUTO: 43.4 % (ref 36.6–50.3)
HGB BLD-MCNC: 15 G/DL (ref 12.1–17)
IMM GRANULOCYTES # BLD: 0 K/UL (ref 0–0.04)
IMM GRANULOCYTES NFR BLD AUTO: 1 % (ref 0–0.5)
LYMPHOCYTES # BLD: 0.7 K/UL (ref 0.8–3.5)
LYMPHOCYTES NFR BLD: 23 % (ref 12–49)
MCH RBC QN AUTO: 31.2 PG (ref 26–34)
MCHC RBC AUTO-ENTMCNC: 34.6 G/DL (ref 30–36.5)
MCV RBC AUTO: 90.2 FL (ref 80–99)
MONOCYTES # BLD: 0.5 K/UL (ref 0–1)
MONOCYTES NFR BLD: 16 % (ref 5–13)
NEUTS SEG # BLD: 1.6 K/UL (ref 1.8–8)
NEUTS SEG NFR BLD: 57 % (ref 32–75)
NRBC # BLD: 0 K/UL (ref 0–0.01)
NRBC BLD-RTO: 0 PER 100 WBC
P-R INTERVAL, ECG05: 130 MS
PLATELET # BLD AUTO: 171 K/UL (ref 150–400)
PMV BLD AUTO: 11.1 FL (ref 8.9–12.9)
POTASSIUM SERPL-SCNC: 3.8 MMOL/L (ref 3.5–5.1)
PROT SERPL-MCNC: 7.5 G/DL (ref 6.4–8.2)
Q-T INTERVAL, ECG07: 330 MS
QRS DURATION, ECG06: 86 MS
QTC CALCULATION (BEZET), ECG08: 405 MS
RBC # BLD AUTO: 4.81 M/UL (ref 4.1–5.7)
RBC MORPH BLD: ABNORMAL
SODIUM SERPL-SCNC: 131 MMOL/L (ref 136–145)
VENTRICULAR RATE, ECG03: 91 BPM
WBC # BLD AUTO: 2.9 K/UL (ref 4.1–11.1)

## 2018-12-23 PROCEDURE — 85025 COMPLETE CBC W/AUTO DIFF WBC: CPT

## 2018-12-23 PROCEDURE — 96374 THER/PROPH/DIAG INJ IV PUSH: CPT

## 2018-12-23 PROCEDURE — 74011250637 HC RX REV CODE- 250/637: Performed by: EMERGENCY MEDICINE

## 2018-12-23 PROCEDURE — 93005 ELECTROCARDIOGRAM TRACING: CPT

## 2018-12-23 PROCEDURE — 80053 COMPREHEN METABOLIC PANEL: CPT

## 2018-12-23 PROCEDURE — 85652 RBC SED RATE AUTOMATED: CPT

## 2018-12-23 PROCEDURE — 74011250636 HC RX REV CODE- 250/636: Performed by: EMERGENCY MEDICINE

## 2018-12-23 PROCEDURE — 36415 COLL VENOUS BLD VENIPUNCTURE: CPT

## 2018-12-23 PROCEDURE — 99283 EMERGENCY DEPT VISIT LOW MDM: CPT

## 2018-12-23 RX ORDER — DEXAMETHASONE SODIUM PHOSPHATE 100 MG/10ML
10 INJECTION INTRAMUSCULAR; INTRAVENOUS
Status: COMPLETED | OUTPATIENT
Start: 2018-12-23 | End: 2018-12-23

## 2018-12-23 RX ORDER — IBUPROFEN 400 MG/1
800 TABLET ORAL ONCE
Status: COMPLETED | OUTPATIENT
Start: 2018-12-23 | End: 2018-12-23

## 2018-12-23 RX ORDER — PREDNISONE 10 MG/1
TABLET ORAL
Qty: 39 TAB | Refills: 0 | Status: ON HOLD | OUTPATIENT
Start: 2018-12-23 | End: 2022-06-23

## 2018-12-23 RX ORDER — DIPHENHYDRAMINE HCL 25 MG
25 CAPSULE ORAL
Status: COMPLETED | OUTPATIENT
Start: 2018-12-23 | End: 2018-12-23

## 2018-12-23 RX ADMIN — DEXAMETHASONE SODIUM PHOSPHATE 10 MG: 10 INJECTION INTRAMUSCULAR; INTRAVENOUS at 22:36

## 2018-12-23 RX ADMIN — DIPHENHYDRAMINE HYDROCHLORIDE 25 MG: 25 CAPSULE ORAL at 22:36

## 2018-12-23 RX ADMIN — IBUPROFEN 800 MG: 400 TABLET ORAL at 21:09

## 2018-12-23 NOTE — LETTER
[facility] EMERGENCY DEPT 
[address] [facility] [address] [phone] 
[phone] Work/School Note Date: 12/23/2018 To Whom It May concern: 
 
Laura Guillermo was seen and treated today in the emergency room by the following provider(s): 
Attending Provider: Jessica Morrison MD.   
 
Laura Guillermo may return to work on 12/26/18.  
 
Sincerely, 
 
 
 
 
Lilibeth Rene

## 2018-12-24 NOTE — ED NOTES
Pt presents ambulatory to ED complaining of rash that he's had for 3 weeks with oozing, crusting, and redness. Pt says he went to an urgent care 2 weeks ago and just finished his antibiotics today. Pt says rash is spreading to back and L leg. Pt also presents with a temp of 101.6. Pt is alert and oriented x 4, RR even and unlabored, skin is warm and dry. Assesment completed and pt updated on plan of care. Emergency Department Nursing Plan of Care       The Nursing Plan of Care is developed from the Nursing assessment and Emergency Department Attending provider initial evaluation. The plan of care may be reviewed in the ED Provider note.     The Plan of Care was developed with the following considerations:   Patient / Family readiness to learn indicated by:verbalized understanding  Persons(s) to be included in education: patient  Barriers to Learning/Limitations:No    Signed     [address], RN    12/23/2018   10:06 PM

## 2018-12-24 NOTE — ED PROVIDER NOTES
EMERGENCY DEPARTMENT HISTORY AND PHYSICAL EXAM      Date: 12/23/2018  Patient Name: Giselle Bergman    History of Presenting Illness     Chief Complaint   Patient presents with    Skin Problem     X 3 weeks       History Provided By: Patient    HPI: Giselle Bergman, 45 y.o. male with PMHx significant for HTN, asthma, DM, presents ambulatory to the ED with c/o worsening rash to chin and posterior neck x 3 weeks. He reports associated redness, pruritic sensation, and \"oozing. \" Pt further describes \"crusting\" of his rash. He reports developing intermittent chills x 3 days, for which he has been taking OTC antipyretics. Pt was seen here on 12/13/18 for the same complaint and was discharged with 10 day course of Bactrim and Keflex. He states he completed the ABX without any improvement in symptoms. Pt notes he tried making any appointment with primary care, but could not been seen until May 2019. Pt denies any recent         There are no other complaints, changes, or physical findings at this time. PCP: None    Current Facility-Administered Medications   Medication Dose Route Frequency Provider Last Rate Last Dose    dexamethasone (DECADRON) injection 10 mg  10 mg IntraVENous NOW Ira Jiménez MD        diphenhydrAMINE (BENADRYL) capsule 25 mg  25 mg Oral NOW Ira Jiménez MD         Current Outpatient Medications   Medication Sig Dispense Refill    predniSONE (STERAPRED DS) 10 mg dose pack 6 pills daily for 3 days, then 4 pills daily for 3 days, then 2 pills daily for 3 days, then 1 pill daily for 3 days 39 Tab 0       Past History     Past Medical History:  Past Medical History:   Diagnosis Date    Asthma     Diabetes ([address]     Hypertension        Past Surgical History:  Past Surgical History:   Procedure Laterality Date    HX OTHER SURGICAL      hemorrhoids removed       Family History:  History reviewed. No pertinent family history.     Social History:  Social History     Tobacco Use    Smoking status: Current Every Day Smoker     Packs/day: 0.25   Substance Use Topics    Alcohol use: No    Drug use: No       Allergies:  No Known Allergies      Review of Systems   Review of Systems   Constitutional: Negative for chills and fever. HENT: Negative for congestion, rhinorrhea, sneezing and sore throat. Eyes: Negative for redness and visual disturbance. Respiratory: Negative for shortness of breath. Cardiovascular: Negative for chest pain and leg swelling. Gastrointestinal: Negative for abdominal pain, nausea and vomiting. Genitourinary: Negative for difficulty urinating and frequency. Musculoskeletal: Negative for back pain, myalgias and neck stiffness. Skin: Positive for rash. Neurological: Negative for dizziness, syncope, weakness and headaches. Hematological: Negative for adenopathy. All other systems reviewed and are negative. Physical Exam   Physical Exam   Constitutional: He is oriented to person, place, and time. He appears well-developed and well-nourished. HENT:   Head: Normocephalic and atraumatic. Nose: Nose normal.   Mouth/Throat: Oropharynx is clear and moist.   Eyes: Conjunctivae and EOM are normal. Pupils are equal, round, and reactive to light. Neck: Normal range of motion. Neck supple. Cardiovascular: Normal rate, regular rhythm, normal heart sounds and intact distal pulses. Pulmonary/Chest: Effort normal and breath sounds normal.   Abdominal: Soft. Bowel sounds are normal. He exhibits no distension. Musculoskeletal: Normal range of motion. He exhibits no edema. Neurological: He is alert and oriented to person, place, and time. He exhibits normal muscle tone. Skin: Skin is warm and dry. Diffuse erythema of skin underlying beard of face and posterior neck. No extremity involvement. Psychiatric: He has a normal mood and affect.  His behavior is normal. Judgment and thought content normal.       Diagnostic Study Results     Labs -     Recent Results (from the past 12 hour(s))   EKG, 12 LEAD, INITIAL    Collection Time: 12/23/18  9:15 PM   Result Value Ref Range    Ventricular Rate 91 BPM    Atrial Rate 91 BPM    P-R Interval 130 ms    QRS Duration 86 ms    Q-T Interval 330 ms    QTC Calculation (Bezet) 405 ms    Calculated P Axis 64 degrees    Calculated R Axis 24 degrees    Calculated T Axis 58 degrees    Diagnosis       Normal sinus rhythm with sinus arrhythmia  Leftward axis  normal variant EKG  no significant interval change  Confirmed by Nino Ray MD, Trudie Habermann ([id number]) on 12/23/2018 9:30:33 PM     CBC WITH AUTOMATED DIFF    Collection Time: 12/23/18  9:20 PM   Result Value Ref Range    WBC 2.9 (L) 4.1 - 11.1 K/uL    RBC 4.81 4.10 - 5.70 M/uL    HGB 15.0 12.1 - 17.0 g/dL    HCT 43.4 36.6 - 50.3 %    MCV 90.2 80.0 - 99.0 FL    MCH 31.2 26.0 - 34.0 PG    MCHC 34.6 30.0 - 36.5 g/dL    RDW 12.6 11.5 - 14.5 %    PLATELET 552 012 - 683 K/uL    MPV 11.1 8.9 - 12.9 FL    NRBC 0.0 0  WBC    ABSOLUTE NRBC 0.00 0.00 - 0.01 K/uL    NEUTROPHILS 57 32 - 75 %    LYMPHOCYTES 23 12 - 49 %    MONOCYTES 16 (H) 5 - 13 %    EOSINOPHILS 2 0 - 7 %    BASOPHILS 1 0 - 1 %    IMMATURE GRANULOCYTES 1 (H) 0.0 - 0.5 %    ABS. NEUTROPHILS 1.6 (L) 1.8 - 8.0 K/UL    ABS. LYMPHOCYTES 0.7 (L) 0.8 - 3.5 K/UL    ABS. MONOCYTES 0.5 0.0 - 1.0 K/UL    ABS. EOSINOPHILS 0.1 0.0 - 0.4 K/UL    ABS. BASOPHILS 0.0 0.0 - 0.1 K/UL    ABS. IMM.  GRANS. 0.0 0.00 - 0.04 K/UL    DF SMEAR SCANNED      RBC COMMENTS NORMOCYTIC, NORMOCHROMIC     METABOLIC PANEL, COMPREHENSIVE    Collection Time: 12/23/18  9:20 PM   Result Value Ref Range    Sodium 131 (L) 136 - 145 mmol/L    Potassium 3.8 3.5 - 5.1 mmol/L    Chloride 96 (L) 97 - 108 mmol/L    CO2 25 21 - 32 mmol/L    Anion gap 10 5 - 15 mmol/L    Glucose 272 (H) 65 - 100 mg/dL    BUN 8 6 - 20 MG/DL    Creatinine 1.02 0.70 - 1.30 MG/DL    BUN/Creatinine ratio 8 (L) 12 - 20      GFR est AA >60 >60 ml/min/1.73m2    GFR est non-AA >60 >60 ml/min/1.73m2 Calcium 8.4 (L) 8.5 - 10.1 MG/DL    Bilirubin, total 0.3 0.2 - 1.0 MG/DL    ALT (SGPT) 24 12 - 78 U/L    AST (SGOT) 14 (L) 15 - 37 U/L    Alk. phosphatase 88 45 - 117 U/L    Protein, total 7.5 6.4 - 8.2 g/dL    Albumin 3.4 (L) 3.5 - 5.0 g/dL    Globulin 4.1 (H) 2.0 - 4.0 g/dL    A-G Ratio 0.8 (L) 1.1 - 2.2     SED RATE (ESR)    Collection Time: 12/23/18  9:20 PM   Result Value Ref Range    Sed rate, automated 6 0 - 15 mm/hr       Radiologic Studies -   No orders to display       Medical Decision Making   I am the first provider for this patient. I reviewed the vital signs, available nursing notes, past medical history, past surgical history, family history and social history. Vital Signs-Reviewed the patient's vital signs. Patient Vitals for the past 12 hrs:   Temp Pulse Resp BP SpO2   12/23/18 2209 99.2 °F (37.3 °C) 90      12/23/18 2135     100 %   12/23/18 2127    (!) 143/99 100 %   12/23/18 2125     99 %   12/23/18 2112    (!) 159/115    12/23/18 2059 (!) 101.6 °F (38.7 °C) (!) 52 16 124/78 99 %       EKG interpretation: (Preliminary) 2115  Rhythm: normal sinus rhythm; and regular . Rate (approx.): 91; Axis: normal; AK interval: normal; QRS interval: normal ; ST/T wave: normal  Written by АЛЕКСАНДР Noland, as dictated by Sabas Sheppard MD.    Records Reviewed: Nursing Notes, Old Medical Records, Previous Radiology Studies and Previous Laboratory Studies    Provider Notes (Medical Decision Making):   DDx: autoimmune dermatitis vs cellulitis     ED Course:   Initial assessment performed. The patients presenting problems have been discussed, and they are in agreement with the care plan formulated and outlined with them. I have encouraged them to ask questions as they arise throughout their visit. 10:26 PM  Discussed lab results with pt and wife. Have ordered Decadron and Benadryl. They agree with the plan. Will discharge home with Prednisone and dermatology f/u. Discharge Note:  10:29 PM  The patient has been re-evaluated and is ready for discharge. Reviewed available results with patient. Counseled patient on diagnosis and care plan. Patient has expressed understanding, and all questions have been answered. Patient agrees with plan and agrees to follow up as recommended, or to return to the ED if their symptoms worsen. Discharge instructions have been provided and explained to the patient, along with reasons to return to the ED. PLAN:  1. Current Discharge Medication List      START taking these medications    Details   predniSONE (STERAPRED DS) 10 mg dose pack 6 pills daily for 3 days, then 4 pills daily for 3 days, then 2 pills daily for 3 days, then 1 pill daily for 3 days  Qty: 39 Tab, Refills: 0           2. Follow-up Information     Follow up With Specialties Details Why Contact Info    None  Call  None  Patient stated that they have no PCP      [facility] EMERGENCY DEPT Emergency Medicine  As needed, If symptoms worsen [address] [facility] Dermatology Department  Schedule an appointment as soon as possible for a visit  [address] [facility]  Floor [address]  [address] [facility]  [phone]        Return to ED if worse     Diagnosis     Clinical Impression:   1. Dermatitis        Attestations: This note is prepared by Diallo Quintana, acting as Scribe for Dolores Wynn MD.    The scribe's documentation has been prepared under my direction and personally reviewed by me in its entirety. I confirm that the note above accurately reflects all work, treatment, procedures, and medical decision making performed by me.   Dolores Wynn MD

## 2018-12-24 NOTE — ED NOTES
Discharge instructions were given to the patient by Jair Mix RN. The patient left the Emergency Department ambulatory, alert and oriented and in no acute distress with 1 prescriptions. The patient was encouraged to call or return to the ED for worsening issues or problems and was encouraged to schedule a follow up appointment for continuing care. The patient verbalized understanding of discharge instructions and prescriptions, all questions were answered. The patient has no further concerns at this time.

## 2018-12-24 NOTE — DISCHARGE INSTRUCTIONS

## 2018-12-24 NOTE — ED TRIAGE NOTES
Pt states facial, lower back, and head skin inflammation X 3 week. Pt states he was prescribed 2 abx and his symptoms are now worse. Pt also states no relief w/ OTC meds.

## 2019-02-25 ENCOUNTER — HOSPITAL ENCOUNTER (EMERGENCY)
Age: 39
Discharge: HOME | End: 2019-02-25
Attending: EMERGENCY MEDICINE | Admitting: EMERGENCY MEDICINE
Payer: MEDICAID

## 2019-02-25 ENCOUNTER — APPOINTMENT (OUTPATIENT)
Dept: GENERAL RADIOLOGY | Age: 39
End: 2019-02-25
Attending: PHYSICIAN ASSISTANT
Payer: MEDICAID

## 2019-02-25 VITALS
HEIGHT: 76 IN | BODY MASS INDEX: 30.08 KG/M2 | TEMPERATURE: 98 F | DIASTOLIC BLOOD PRESSURE: 94 MMHG | SYSTOLIC BLOOD PRESSURE: 148 MMHG | RESPIRATION RATE: 18 BRPM | WEIGHT: 247 LBS | HEART RATE: 97 BPM | OXYGEN SATURATION: 100 %

## 2019-02-25 DIAGNOSIS — M54.50 ACUTE MIDLINE LOW BACK PAIN WITHOUT SCIATICA: Primary | ICD-10-CM

## 2019-02-25 PROCEDURE — 74011250637 HC RX REV CODE- 250/637: Performed by: PHYSICIAN ASSISTANT

## 2019-02-25 PROCEDURE — 99282 EMERGENCY DEPT VISIT SF MDM: CPT

## 2019-02-25 PROCEDURE — 74011250636 HC RX REV CODE- 250/636: Performed by: PHYSICIAN ASSISTANT

## 2019-02-25 PROCEDURE — 72100 X-RAY EXAM L-S SPINE 2/3 VWS: CPT

## 2019-02-25 PROCEDURE — 96372 THER/PROPH/DIAG INJ SC/IM: CPT

## 2019-02-25 RX ORDER — OXYCODONE AND ACETAMINOPHEN 5; 325 MG/1; MG/1
1 TABLET ORAL
Status: COMPLETED | OUTPATIENT
Start: 2019-02-25 | End: 2019-02-25

## 2019-02-25 RX ORDER — CYCLOBENZAPRINE HCL 10 MG
10 TABLET ORAL
Qty: 15 TAB | Refills: 0 | Status: ON HOLD | OUTPATIENT
Start: 2019-02-25 | End: 2022-06-23

## 2019-02-25 RX ORDER — KETOROLAC TROMETHAMINE 30 MG/ML
30 INJECTION, SOLUTION INTRAMUSCULAR; INTRAVENOUS
Status: COMPLETED | OUTPATIENT
Start: 2019-02-25 | End: 2019-02-25

## 2019-02-25 RX ORDER — OXYCODONE AND ACETAMINOPHEN 5; 325 MG/1; MG/1
1 TABLET ORAL
Qty: 10 TAB | Refills: 0 | Status: ON HOLD | OUTPATIENT
Start: 2019-02-25 | End: 2022-06-23

## 2019-02-25 RX ORDER — IBUPROFEN 600 MG/1
600 TABLET ORAL
Qty: 20 TAB | Refills: 0 | Status: SHIPPED | OUTPATIENT
Start: 2019-02-25 | End: 2022-10-26

## 2019-02-25 RX ADMIN — KETOROLAC TROMETHAMINE 30 MG: 30 INJECTION INTRAMUSCULAR; INTRAVENOUS at 19:25

## 2019-02-25 RX ADMIN — OXYCODONE HYDROCHLORIDE AND ACETAMINOPHEN 1 TABLET: 5; 325 TABLET ORAL at 19:25

## 2019-02-25 NOTE — LETTER
[facility] EMERGENCY DEPT 
[address] [place] [address] [id number] [phone] Work/School Note Date: 2/25/2019 To Whom It May concern: 
 
Elizabeth Akhtar was seen and treated today in the emergency room by the following provider(s): 
Attending Provider: Ranjith Dumont MD 
Physician Assistant: NOEMI Torres. Please excuse him from work for the rest of the week. He may return to work on Monday, March 4.  
 
Sincerely, 
 
 
 
 
NOEMI Reyna

## 2019-02-25 NOTE — ED TRIAGE NOTES
Pt denies injury, pt states, \"I was fine, I just bent over to  something. \" reporting bilateral low back pain radiating down both legs, denies history of this.

## 2019-02-26 NOTE — DISCHARGE INSTRUCTIONS
Patient Education        Back Pain: Care Instructions  Your Care Instructions    Back pain has many possible causes. It is often related to problems with muscles and ligaments of the back. It may also be related to problems with the nerves, discs, or bones of the back. Moving, lifting, standing, sitting, or sleeping in an awkward way can strain the back. Sometimes you don't notice the injury until later. Arthritis is another common cause of back pain. Although it may hurt a lot, back pain usually improves on its own within several weeks. Most people recover in 12 weeks or less. Using good home treatment and being careful not to stress your back can help you feel better sooner. Follow-up care is a key part of your treatment and safety. Be sure to make and go to all appointments, and call your doctor if you are having problems. It's also a good idea to know your test results and keep a list of the medicines you take. How can you care for yourself at home? · Sit or lie in positions that are most comfortable and reduce your pain. Try one of these positions when you lie down:  ? Lie on your back with your knees bent and supported by large pillows. ? Lie on the floor with your legs on the seat of a sofa or chair. ? Lie on your side with your knees and hips bent and a pillow between your legs. ? Lie on your stomach if it does not make pain worse. · Do not sit up in bed, and avoid soft couches and twisted positions. Bed rest can help relieve pain at first, but it delays healing. Avoid bed rest after the first day of back pain. · Change positions every 30 minutes. If you must sit for long periods of time, take breaks from sitting. Get up and walk around, or lie in a comfortable position. · Try using a heating pad on a low or medium setting for 15 to 20 minutes every 2 or 3 hours. Try a warm shower in place of one session with the heating pad. · You can also try an ice pack for 10 to 15 minutes every 2 to 3 hours. Put a thin cloth between the ice pack and your skin. · Take pain medicines exactly as directed. ? If the doctor gave you a prescription medicine for pain, take it as prescribed. ? If you are not taking a prescription pain medicine, ask your doctor if you can take an over-the-counter medicine. · Take short walks several times a day. You can start with 5 to 10 minutes, 3 or 4 times a day, and work up to longer walks. Walk on level surfaces and avoid hills and stairs until your back is better. · Return to work and other activities as soon as you can. Continued rest without activity is usually not good for your back. · To prevent future back pain, do exercises to stretch and strengthen your back and stomach. Learn how to use good posture, safe lifting techniques, and proper body mechanics. When should you call for help? Call your doctor now or seek immediate medical care if:    · You have new or worsening numbness in your legs.     · You have new or worsening weakness in your legs. (This could make it hard to stand up.)     · You lose control of your bladder or bowels.    Watch closely for changes in your health, and be sure to contact your doctor if:    · You have a fever, lose weight, or don't feel well.     · You do not get better as expected. Where can you learn more? Go to http://juliann-julia.info/. Enter A970 in the search box to learn more about \"Back Pain: Care Instructions. \"  Current as of: September 20, 2018  Content Version: 11.9  © 1882-4036 [employer]. Care instructions adapted under license by [employer] (which disclaims liability or warranty for this information). If you have questions about a medical condition or this instruction, always ask your healthcare professional. [place] [address] any warranty or liability for your use of this information.

## 2019-02-26 NOTE — ED PROVIDER NOTES
EMERGENCY DEPARTMENT HISTORY AND PHYSICAL EXAM    Date: 2/25/2019  Patient Name: Myrna Caceres    History of Presenting Illness     Chief Complaint   Patient presents with    Back Pain         History Provided By: Patient      HPI: Myrna Caceres is a 44 y.o. male with a PMH of diabetes who presents with severe low back pain onset yesterday. He was leaning over when he had the sudden onset of pain. The pain has been constant since then. It is worse with movement and ambulation. He has taken naproxen and used lidocaine patch with mild relief. The pain radiates into the back of his bilateral upper legs with associated intermittent paresthesias. He denies leg weakness, bowel or bladder incontinence, urinary retention, saddle anesthesia, abd pain, nausea, vomiting. PCP: None    Current Outpatient Medications   Medication Sig Dispense Refill    ibuprofen (MOTRIN) 600 mg tablet Take 1 Tab by mouth every six (6) hours as needed for Pain. 20 Tab 0    cyclobenzaprine (FLEXERIL) 10 mg tablet Take 1 Tab by mouth three (3) times daily as needed for Muscle Spasm(s). 15 Tab 0    oxyCODONE-acetaminophen (PERCOCET) 5-325 mg per tablet Take 1 Tab by mouth every four (4) hours as needed for Pain. Max Daily Amount: 6 Tabs. 10 Tab 0    predniSONE (STERAPRED DS) 10 mg dose pack 6 pills daily for 3 days, then 4 pills daily for 3 days, then 2 pills daily for 3 days, then 1 pill daily for 3 days 39 Tab 0       Past History     Past Medical History:  Past Medical History:   Diagnosis Date    Asthma     Diabetes ([facility] [address]     Hypertension        Past Surgical History:  Past Surgical History:   Procedure Laterality Date    HX OTHER SURGICAL      hemorrhoids removed       Family History:  No family history on file.     Social History:  Social History     Tobacco Use    Smoking status: Current Every Day Smoker     Packs/day: 0.25   Substance Use Topics    Alcohol use: No    Drug use: No       Allergies:  No Known Allergies      Review of Systems   Review of Systems   Constitutional: Negative for chills and fever. HENT: Negative for ear pain and sore throat. Eyes: Negative for redness and visual disturbance. Respiratory: Negative for cough and shortness of breath. Cardiovascular: Negative for chest pain and palpitations. Gastrointestinal: Negative for abdominal pain, nausea and vomiting. Genitourinary: Negative for dysuria and hematuria. Musculoskeletal: Positive for back pain. Negative for gait problem. Skin: Negative for rash and wound. Neurological: Negative for dizziness, weakness, numbness and headaches. Psychiatric/Behavioral: Negative for behavioral problems and confusion. All other systems reviewed and are negative. Physical Exam     Vitals:    02/25/19 1829   BP: (!) 148/94   Pulse: 97   Resp: 18   Temp: 98 °F (36.7 °C)   SpO2: 100%   Weight: 112 kg (247 lb)   Height: 6' 4\" (1.93 m)     Physical Exam   Constitutional: He is oriented to person, place, and time. He appears well-developed and well-nourished. HENT:   Head: Normocephalic and atraumatic. Eyes: Conjunctivae and EOM are normal. Pupils are equal, round, and reactive to light. Neck: Normal range of motion. Neck supple. Cardiovascular: Normal rate, regular rhythm and normal heart sounds. Pulses:       Dorsalis pedis pulses are 2+ on the right side, and 2+ on the left side. Pulmonary/Chest: Effort normal and breath sounds normal.   Abdominal: Soft. He exhibits no distension. There is no tenderness. There is no rebound and no guarding. Musculoskeletal:        Lumbar back: He exhibits bony tenderness. He exhibits no deformity. Neurological: He is alert and oriented to person, place, and time. He has normal strength. No cranial nerve deficit or sensory deficit. Gait normal. GCS eye subscore is 4. GCS verbal subscore is 5. GCS motor subscore is 6. Skin: Skin is warm and dry. No rash noted. Psychiatric: He has a normal mood and affect.  His behavior is normal.   Nursing note and vitals reviewed. Diagnostic Study Results     Labs -   No results found for this or any previous visit (from the past 12 hour(s)). Radiologic Studies -   XR SPINE LUMB 2 OR 3 V   Final Result   IMPRESSION:     1. Degenerative disc disease at L4/L5 and L5/S1. CT Results  (Last 48 hours)    None        CXR Results  (Last 48 hours)    None            Medical Decision Making   I am the first provider for this patient. I reviewed the vital signs, available nursing notes, past medical history, past surgical history, family history and social history. Vital Signs-Reviewed the patient's vital signs. Records Reviewed: Nursing Notes and Old Medical Records            Disposition:  discharged    DISCHARGE NOTE:   8:30 PM -  The pt is ready for discharge. The pt's signs, symptoms, diagnosis, and discharge instructions have been discussed and pt has conveyed their understanding. The pt is to follow up as recommended or return to ER should their symptoms worsen. Plan has been discussed and pt is in agreement. Follow-up Information     Follow up With Specialties Details Why [address]  Call to schedule a follow up appointment [facility]  [id number] [place] [address]      Go to      [address] [place] [address] DEPT Emergency Medicine  If symptoms worsen [address] [address] [facility]\"" [address]          Discharge Medication List as of 2/25/2019  8:27 PM      START taking these medications    Details   ibuprofen (MOTRIN) 600 mg tablet Take 1 Tab by mouth every six (6) hours as needed for Pain., Normal, Disp-20 Tab, R-0      cyclobenzaprine (FLEXERIL) 10 mg tablet Take 1 Tab by mouth three (3) times daily as needed for Muscle Spasm(s). , Normal, Disp-15 Tab, R-0      oxyCODONE-acetaminophen (PERCOCET) 5-325 mg per tablet Take 1 Tab by mouth every four (4) hours as needed for Pain. Max Daily Amount: 6 Tabs. , Print, Disp-10 Tab, R-0         CONTINUE these medications which have NOT CHANGED    Details   predniSONE (STERAPRED DS) 10 mg dose pack 6 pills daily for 3 days, then 4 pills daily for 3 days, then 2 pills daily for 3 days, then 1 pill daily for 3 days, Normal, Disp-39 Tab, R-0             Provider Notes (Medical Decision Making):   DDx - lumbosacral strain, herniated disc, compression fracture    Procedures:  Procedures        Diagnosis     Clinical Impression:   1.  Acute midline low back pain without sciatica

## 2019-02-26 NOTE — ED NOTES
Pt presents ambulatory to ED complaining of lower back pain that radiates down his legs bilaterally x1 day. Pt reports he was bending over to pick something up and his back \"froze up\". Pt is alert and oriented x 4, RR even and unlabored, skin is warm and dry. Assesment completed and pt updated on plan of care. Emergency Department Nursing Plan of Care       The Nursing Plan of Care is developed from the Nursing assessment and Emergency Department Attending provider initial evaluation. The plan of care may be reviewed in the ED Provider note.     The Plan of Care was developed with the following considerations:   Patient / Family readiness to learn indicated by:verbalized understanding  Persons(s) to be included in education: patient  Barriers to Learning/Limitations:No    Signed     Kaleb Arevalo RN    2/25/2019   7:08 PM

## 2019-02-26 NOTE — ED NOTES
Patient has been instructed that they have been given Percocet* which contains opioids, benzodiazepines, or other sedating drugs. Patient is aware that they  will need to refrain from driving or operating heavy machinery after taking this medication. Patient also instructed that they need to avoid drinking alcohol and using other products containing opioids, benzodiazepines, or other sedating drugs. Patient verbalized understanding. Pt discharged by provider.

## 2022-06-23 ENCOUNTER — ANESTHESIA EVENT (OUTPATIENT)
Dept: ENDOSCOPY | Age: 42
End: 2022-06-23
Payer: COMMERCIAL

## 2022-06-23 ENCOUNTER — ANESTHESIA (OUTPATIENT)
Dept: ENDOSCOPY | Age: 42
End: 2022-06-23
Payer: COMMERCIAL

## 2022-06-23 ENCOUNTER — HOSPITAL ENCOUNTER (OUTPATIENT)
Age: 42
Setting detail: OUTPATIENT SURGERY
Discharge: HOME | End: 2022-06-23
Attending: INTERNAL MEDICINE | Admitting: INTERNAL MEDICINE
Payer: COMMERCIAL

## 2022-06-23 VITALS
HEART RATE: 86 BPM | BODY MASS INDEX: 28.01 KG/M2 | RESPIRATION RATE: 21 BRPM | OXYGEN SATURATION: 100 % | DIASTOLIC BLOOD PRESSURE: 111 MMHG | TEMPERATURE: 97.7 F | SYSTOLIC BLOOD PRESSURE: 141 MMHG | HEIGHT: 76 IN | WEIGHT: 230 LBS

## 2022-06-23 LAB
GLUCOSE BLD STRIP.AUTO-MCNC: 275 MG/DL (ref 65–117)
GLUCOSE BLD STRIP.AUTO-MCNC: 342 MG/DL (ref 65–117)
SERVICE CMNT-IMP: ABNORMAL
SERVICE CMNT-IMP: ABNORMAL

## 2022-06-23 PROCEDURE — 88305 TISSUE EXAM BY PATHOLOGIST: CPT

## 2022-06-23 PROCEDURE — 74011250636 HC RX REV CODE- 250/636: Performed by: INTERNAL MEDICINE

## 2022-06-23 PROCEDURE — 74011000250 HC RX REV CODE- 250: Performed by: NURSE ANESTHETIST, CERTIFIED REGISTERED

## 2022-06-23 PROCEDURE — 82962 GLUCOSE BLOOD TEST: CPT

## 2022-06-23 PROCEDURE — 2709999900 HC NON-CHARGEABLE SUPPLY: Performed by: INTERNAL MEDICINE

## 2022-06-23 PROCEDURE — 77030021593 HC FCPS BIOP ENDOSC BSC -A: Performed by: INTERNAL MEDICINE

## 2022-06-23 PROCEDURE — 74011636637 HC RX REV CODE- 636/637: Performed by: NURSE ANESTHETIST, CERTIFIED REGISTERED

## 2022-06-23 PROCEDURE — 76060000032 HC ANESTHESIA 0.5 TO 1 HR: Performed by: INTERNAL MEDICINE

## 2022-06-23 PROCEDURE — 77030013992 HC SNR POLYP ENDOSC BSC -B: Performed by: INTERNAL MEDICINE

## 2022-06-23 PROCEDURE — 76040000007: Performed by: INTERNAL MEDICINE

## 2022-06-23 PROCEDURE — 74011250636 HC RX REV CODE- 250/636: Performed by: NURSE ANESTHETIST, CERTIFIED REGISTERED

## 2022-06-23 RX ORDER — NALOXONE HYDROCHLORIDE 0.4 MG/ML
0.4 INJECTION, SOLUTION INTRAMUSCULAR; INTRAVENOUS; SUBCUTANEOUS
Status: DISCONTINUED | OUTPATIENT
Start: 2022-06-23 | End: 2022-06-23 | Stop reason: HOSPADM

## 2022-06-23 RX ORDER — FLUMAZENIL 0.1 MG/ML
0.2 INJECTION INTRAVENOUS
Status: DISCONTINUED | OUTPATIENT
Start: 2022-06-23 | End: 2022-06-23 | Stop reason: HOSPADM

## 2022-06-23 RX ORDER — LISINOPRIL 10 MG/1
10 TABLET ORAL DAILY
COMMUNITY

## 2022-06-23 RX ORDER — SODIUM CHLORIDE 9 MG/ML
25 INJECTION, SOLUTION INTRAVENOUS CONTINUOUS
Status: DISCONTINUED | OUTPATIENT
Start: 2022-06-23 | End: 2022-06-23 | Stop reason: HOSPADM

## 2022-06-23 RX ORDER — LIDOCAINE HYDROCHLORIDE 20 MG/ML
INJECTION, SOLUTION EPIDURAL; INFILTRATION; INTRACAUDAL; PERINEURAL AS NEEDED
Status: DISCONTINUED | OUTPATIENT
Start: 2022-06-23 | End: 2022-06-23 | Stop reason: HOSPADM

## 2022-06-23 RX ORDER — PROPOFOL 10 MG/ML
INJECTION, EMULSION INTRAVENOUS AS NEEDED
Status: DISCONTINUED | OUTPATIENT
Start: 2022-06-23 | End: 2022-06-23 | Stop reason: HOSPADM

## 2022-06-23 RX ORDER — EPINEPHRINE 0.1 MG/ML
1 INJECTION INTRACARDIAC; INTRAVENOUS
Status: DISCONTINUED | OUTPATIENT
Start: 2022-06-23 | End: 2022-06-23 | Stop reason: HOSPADM

## 2022-06-23 RX ORDER — DEXTROMETHORPHAN/PSEUDOEPHED 2.5-7.5/.8
1.2 DROPS ORAL
Status: DISCONTINUED | OUTPATIENT
Start: 2022-06-23 | End: 2022-06-23 | Stop reason: HOSPADM

## 2022-06-23 RX ORDER — ALBUTEROL SULFATE 90 UG/1
2 AEROSOL, METERED RESPIRATORY (INHALATION)
COMMUNITY

## 2022-06-23 RX ORDER — INSULIN GLARGINE 100 [IU]/ML
36 INJECTION, SOLUTION SUBCUTANEOUS
COMMUNITY

## 2022-06-23 RX ORDER — SODIUM CHLORIDE 0.9 % (FLUSH) 0.9 %
5-40 SYRINGE (ML) INJECTION EVERY 8 HOURS
Status: DISCONTINUED | OUTPATIENT
Start: 2022-06-23 | End: 2022-06-23 | Stop reason: HOSPADM

## 2022-06-23 RX ORDER — ATROPINE SULFATE 0.1 MG/ML
0.5 INJECTION INTRAVENOUS
Status: DISCONTINUED | OUTPATIENT
Start: 2022-06-23 | End: 2022-06-23 | Stop reason: HOSPADM

## 2022-06-23 RX ORDER — METFORMIN HYDROCHLORIDE 850 MG/1
850 TABLET ORAL DAILY
COMMUNITY

## 2022-06-23 RX ORDER — LABETALOL HYDROCHLORIDE 5 MG/ML
INJECTION, SOLUTION INTRAVENOUS AS NEEDED
Status: DISCONTINUED | OUTPATIENT
Start: 2022-06-23 | End: 2022-06-23 | Stop reason: HOSPADM

## 2022-06-23 RX ORDER — SODIUM CHLORIDE 0.9 % (FLUSH) 0.9 %
5-40 SYRINGE (ML) INJECTION AS NEEDED
Status: DISCONTINUED | OUTPATIENT
Start: 2022-06-23 | End: 2022-06-23 | Stop reason: HOSPADM

## 2022-06-23 RX ADMIN — PROPOFOL 100 MG: 10 INJECTION, EMULSION INTRAVENOUS at 09:32

## 2022-06-23 RX ADMIN — PROPOFOL 80 MG: 10 INJECTION, EMULSION INTRAVENOUS at 09:29

## 2022-06-23 RX ADMIN — Medication 8 UNITS: at 09:23

## 2022-06-23 RX ADMIN — PROPOFOL 50 MG: 10 INJECTION, EMULSION INTRAVENOUS at 09:40

## 2022-06-23 RX ADMIN — LABETALOL HYDROCHLORIDE 10 MG: 5 INJECTION, SOLUTION INTRAVENOUS at 09:53

## 2022-06-23 RX ADMIN — SODIUM CHLORIDE 25 ML/HR: 9 INJECTION, SOLUTION INTRAVENOUS at 09:24

## 2022-06-23 RX ADMIN — PROPOFOL 100 MG: 10 INJECTION, EMULSION INTRAVENOUS at 09:30

## 2022-06-23 RX ADMIN — PROPOFOL 50 MG: 10 INJECTION, EMULSION INTRAVENOUS at 09:38

## 2022-06-23 RX ADMIN — PROPOFOL 70 MG: 10 INJECTION, EMULSION INTRAVENOUS at 09:31

## 2022-06-23 RX ADMIN — PROPOFOL 50 MG: 10 INJECTION, EMULSION INTRAVENOUS at 09:33

## 2022-06-23 RX ADMIN — LIDOCAINE HYDROCHLORIDE 40 MG: 20 INJECTION, SOLUTION EPIDURAL; INFILTRATION; INTRACAUDAL; PERINEURAL at 09:29

## 2022-06-23 RX ADMIN — PROPOFOL 50 MG: 10 INJECTION, EMULSION INTRAVENOUS at 09:44

## 2022-06-23 RX ADMIN — PROPOFOL 50 MG: 10 INJECTION, EMULSION INTRAVENOUS at 09:48

## 2022-06-23 RX ADMIN — PROPOFOL 50 MG: 10 INJECTION, EMULSION INTRAVENOUS at 09:36

## 2022-06-23 RX ADMIN — PROPOFOL 50 MG: 10 INJECTION, EMULSION INTRAVENOUS at 09:42

## 2022-06-23 NOTE — ANESTHESIA PREPROCEDURE EVALUATION
Relevant Problems   No relevant active problems       Anesthetic History   No history of anesthetic complications            Review of Systems / Medical History  Patient summary reviewed, nursing notes reviewed and pertinent labs reviewed    Pulmonary  Within defined limits          Asthma        Neuro/Psych   Within defined limits           Cardiovascular  Within defined limits  Hypertension              Exercise tolerance: >4 METS     GI/Hepatic/Renal  Within defined limits              Endo/Other  Within defined limits  Diabetes         Other Findings              Physical Exam    Airway  Mallampati: II  TM Distance: 4 - 6 cm  Neck ROM: normal range of motion   Mouth opening: Normal     Cardiovascular  Regular rate and rhythm,  S1 and S2 normal,  no murmur, click, rub, or gallop             Dental  No notable dental hx       Pulmonary  Breath sounds clear to auscultation               Abdominal  GI exam deferred       Other Findings            Anesthetic Plan    ASA: 2  Anesthesia type: general and total IV anesthesia          Induction: Intravenous  Anesthetic plan and risks discussed with: Patient      Propofol MAC

## 2022-06-23 NOTE — DISCHARGE INSTRUCTIONS
Devorah Roque  [id number]  1980    COLON DISCHARGE INSTRUCTIONS  Discomfort:  Redness at IV site- apply warm compress to area; if redness or soreness persist- contact your physician  There may be a slight amount of blood passed from the rectum  Gaseous discomfort- walking, belching will help relieve any discomfort  You may not operate a vehicle for 12 hours  You may not engage in an occupation involving machinery or appliances for rest of today  You may not drink alcoholic beverages for at least 12 hours  Avoid making any critical decisions for at least 24 hour  DIET:   High fiber diet. - however -  remember your colon is empty and a heavy meal will produce gas. Avoid these foods:  vegetables, fried / greasy foods, carbonated drinks for today  MEDICATION:  (See attached)     ACTIVITY:  You may not resume your normal daily activities until tomorrow AM; it is recommended that you spend the remainder of the day resting -  avoid any strenuous activity. CALL M.D. ANY SIGN OF:   Increasing pain, nausea, vomiting  Abdominal distension (swelling)  New increased bleeding (oral or rectal)  Fever (chills)  Pain in chest area  Bloody discharge from nose or mouth  Shortness of breath      IMPRESSION:  -- FOUR total colon polyps, all removed today  -- mild diverticulosis, which is when small out-pouchings in the inner lining of the colon form, little stretched out pockets. This is very common, and a diet high in fiber with the addition of a daily fiber supplement (like 2 teaspoons of metamucil or psyllium husk fiber powder mixed in water) can help treat this! -- we saw some hemorrhoids, the likely source of bleeding, which are very common, and these are swollen veins at the anal canal or end of the rectum, which can bulge with constipation and straining or frequent bowel movements. Sometimes they cause bleeding, burning, pressure, or even pain.  A diet high in fiber helps, but using a daily fiber supplement (like 2 teaspoons of psyllium husk powder or metamucil) can help treat these.       Follow-up Instructions:   Call Dr. Curtis Spann for the results of procedure / biopsy in 7-10 days  Telephone # [phone]  Repeat colonoscopy in 3-5 years    Sloan Baron MD

## 2022-06-23 NOTE — PROCEDURES
NAME:  Lavelle Keita   :   1980   MRN:   [id number]     Date/Time:  2022 9:52 AM    Colonoscopy Operative Report    Procedure Type:  Colonoscopy with polypectomy (cold snare)     Indications: rectal bleeding  Pre-operative Diagnosis: see indication above  Post-operative Diagnosis:  See findings below  :  Magdy Carty MD  Referring Provider: -None    Exam:  Airway: clear, no airway problems anticipated  Heart: RRR, without gallops or rubs  Lungs: clear bilaterally without wheezes, crackles, or rhonchi  Abdomen: soft, nontender, nondistended, bowel sounds present  Mental Status: awake, alert and oriented to person, place and time    Sedation:  MAC anesthesia Propofol  Procedure Details:  After informed consent was obtained with all risks and benefits of procedure explained and preoperative exam completed, the patient was taken to the endoscopy suite and placed in the left lateral decubitus position. Upon sequential sedation as per above, a digital rectal exam was performed demonstrating internal hemorrhoids. The Olympus videocolonoscope  was inserted in the rectum and carefully advanced to the terminal ileum. The quality of preparation was good. The colonoscope was slowly withdrawn with careful evaluation between folds. Retroflexion in the rectum was completed demonstrating internal hemorrhoids. Findings:   ANUS: Anal exam reveals no masses or hemorrhoids, sphincter tone is normal.   RECTUM: Rectal exam reveals no masses but internalhemorrhoids. SIGMOID COLON: mild diverticulosis. DESCENDING COLON: Three sessile polyps, sized 0.2 to 0.4 cm, removed with cold snare. TRANSVERSE COLON: The mucosa is normal with good vascular pattern and without ulcers, diverticula, and polyps. ASCENDING COLON: The mucosa is normal with good vascular pattern and without ulcers, diverticula, and polyps.    CECUM: The appendiceal orifice appears normal. The ileocecal valve appears normal. Single 0.4 cm sessile polyp, removed with cold snare  TERMINAL ILEUM: The terminal ileum was normal.       Specimen Removed:  Cecal polyp, descending colon polyps  Complications:  None. EBL:     None. Impression:  -- four total colon polyps removed as above  -- mild sigmoid diverticulosis  -- internal hemorrhoids, the likely source of rectal bleeding    Recommendations:   -- await pathology  -- high fiber diet  -- repeat colonoscopy in 3-5 years    Discharge Disposition:  Home in the company of a  when able to ambulate.       Adele Wang MD

## 2022-06-23 NOTE — ROUTINE PROCESS
Rajni Chavez  1980  [id number]    Situation:  Verbal report received from: Anahi  Procedure: Procedure(s):  COLONOSCOPY  COLON BIOPSY  ENDOSCOPIC POLYPECTOMY    Background:    Preoperative diagnosis: Rectal bleeding [K62.5]  Postoperative diagnosis: polyps, hemorhoids, diverticulosis      :  Dr. Curtis Spann  Assistant(s): Endoscopy Technician-1: Coreen Le  Endoscopy RN-1: Nicky Benoit RN    Specimens:   ID Type Source Tests Collected by Time Destination   1 : Bx Preservative Cecum  Maryjane Reza MD 2022 0060 Pathology   2 : Polyp Preservative Colon, Descending  Maryjane Reza MD 2022 0945 Pathology     H. Pylori  no    Assessment:  Intra-procedure medications     Anesthesia gave intra-procedure sedation and medications, see anesthesia flow sheet yes    Intravenous fluids: NS@ KVO     Vital signs stable     Abdominal assessment: round and soft     Recommendation:  Discharge patient per MD order  Return to floor  Family or Friend   Permission to share finding with family or friend yes

## 2022-06-23 NOTE — H&P
Gastroenterology Outpatient History and Physical    Patient: Philly Tyler    Physician: Guille Katz MD    Chief Complaint: rectal bleeding  History of Present Illness: 44 yo M with rectal bleeding. History:  Past Medical History:   Diagnosis Date    Asthma     Diabetes ([address]     Hypertension       Past Surgical History:   Procedure Laterality Date    HX OTHER SURGICAL      hemorrhoids removed      Social History     Socioeconomic History    Marital status: SINGLE   Tobacco Use    Smoking status: Current Every Day Smoker     Packs/day: 0.25   Substance and Sexual Activity    Alcohol use: No    Drug use: No    Sexual activity: Yes     Partners: Female     Birth control/protection: None    No family history on file. There is no problem list on file for this patient. Allergies: No Known Allergies  Medications:   Prior to Admission medications    Medication Sig Start Date End Date Taking? Authorizing Provider   ibuprofen (MOTRIN) 600 mg tablet Take 1 Tab by mouth every six (6) hours as needed for Pain. 2/25/19   Balbina Wolfe PA   cyclobenzaprine (FLEXERIL) 10 mg tablet Take 1 Tab by mouth three (3) times daily as needed for Muscle Spasm(s). 2/25/19   Balbina Wolfe PA   oxyCODONE-acetaminophen (PERCOCET) 5-325 mg per tablet Take 1 Tab by mouth every four (4) hours as needed for Pain. Max Daily Amount: 6 Tabs. 2/25/19   Balbina Wolfe PA   predniSONE (STERAPRED DS) 10 mg dose pack 6 pills daily for 3 days, then 4 pills daily for 3 days, then 2 pills daily for 3 days, then 1 pill daily for 3 days 12/23/18   Andrea Jiménez MD     Physical Exam:   Vital Signs: There were no vitals taken for this visit.   General: well developed, well nourished   HEENT: unremarkable   Heart: regular rhythm no mumur    Lungs: clear   Abdominal:  benign   Neurological: unremarkable   Extremities: no edema     Findings/Diagnosis: rectal bleedign  Plan of Care/Planned Procedure: colonoscopy with conscious/deep sedation    Signed:  Eve Cherry MD 6/23/2022

## 2022-06-23 NOTE — ANESTHESIA POSTPROCEDURE EVALUATION
Procedure(s):  COLONOSCOPY  COLON BIOPSY  ENDOSCOPIC POLYPECTOMY. general, total IV anesthesia    Anesthesia Post Evaluation        Patient location during evaluation: PACU  Note status: Adequate. Level of consciousness: responsive to verbal stimuli and sleepy but conscious  Pain management: satisfactory to patient  Airway patency: patent  Anesthetic complications: no  Cardiovascular status: acceptable  Respiratory status: acceptable  Hydration status: acceptable  Comments: +Post-Anesthesia Evaluation and Assessment    Patient: Philly Tyler MRN: [id number]  SSN: [id number]   YOB: 1980  Age: 43 y.o. Sex: male      Cardiovascular Function/Vital Signs    BP (!) 141/111   Pulse 86   Temp 36.5 °C (97.7 °F)   Resp 21   Ht 6' 4\" (1.93 m)   Wt 104.3 kg (230 lb)   SpO2 100%   BMI 28.00 kg/m²     Patient is status post Procedure(s):  COLONOSCOPY  COLON BIOPSY  ENDOSCOPIC POLYPECTOMY. Nausea/Vomiting: Controlled. Postoperative hydration reviewed and adequate. Pain:  Pain Scale 1: Numeric (0 - 10) (06/23/22 1021)  Pain Intensity 1: 0 (06/23/22 1021)   Managed. Neurological Status: At baseline. Mental Status and Level of Consciousness: Arousable. Pulmonary Status:   O2 Device: None (Room air) (06/23/22 0916)   Adequate oxygenation and airway patent. Complications related to anesthesia: None    Post-anesthesia assessment completed. No concerns. Signed By: Chiquita Hubbard MD    6/23/2022  Post anesthesia nausea and vomiting:  controlled      INITIAL Post-op Vital signs:   Vitals Value Taken Time   /111 06/23/22 1024   Temp 36.5 °C (97.7 °F) 06/23/22 1003   Pulse 81 06/23/22 1025   Resp 11 06/23/22 1025   SpO2 100 % 06/23/22 1025   Vitals shown include unvalidated device data.

## 2022-10-26 ENCOUNTER — APPOINTMENT (OUTPATIENT)
Dept: CT IMAGING | Age: 42
DRG: 282 | End: 2022-10-26
Attending: PHYSICIAN ASSISTANT
Payer: COMMERCIAL

## 2022-10-26 ENCOUNTER — HOSPITAL ENCOUNTER (EMERGENCY)
Age: 42
Discharge: HOME | DRG: 282 | End: 2022-10-26
Attending: EMERGENCY MEDICINE
Payer: COMMERCIAL

## 2022-10-26 VITALS
WEIGHT: 225.75 LBS | HEIGHT: 76 IN | OXYGEN SATURATION: 100 % | DIASTOLIC BLOOD PRESSURE: 103 MMHG | BODY MASS INDEX: 27.49 KG/M2 | SYSTOLIC BLOOD PRESSURE: 150 MMHG | RESPIRATION RATE: 17 BRPM | TEMPERATURE: 98.7 F | HEART RATE: 69 BPM

## 2022-10-26 DIAGNOSIS — K85.90 ACUTE PANCREATITIS WITHOUT INFECTION OR NECROSIS, UNSPECIFIED PANCREATITIS TYPE: Primary | ICD-10-CM

## 2022-10-26 DIAGNOSIS — E87.1 HYPONATREMIA: ICD-10-CM

## 2022-10-26 DIAGNOSIS — R73.9 HYPERGLYCEMIA: ICD-10-CM

## 2022-10-26 LAB
ALBUMIN SERPL-MCNC: 3.3 G/DL (ref 3.5–5)
ALBUMIN/GLOB SERPL: 0.8 (ref 1.1–2.2)
ALP SERPL-CCNC: 116 U/L (ref 45–117)
ALT SERPL-CCNC: 22 U/L (ref 12–78)
ANION GAP SERPL CALC-SCNC: 5 MMOL/L (ref 5–15)
AST SERPL-CCNC: 8 U/L (ref 15–37)
BASOPHILS # BLD: 0.1 K/UL (ref 0–0.1)
BASOPHILS NFR BLD: 1 % (ref 0–1)
BILIRUB SERPL-MCNC: 0.7 MG/DL (ref 0.2–1)
BUN SERPL-MCNC: 7 MG/DL (ref 6–20)
BUN/CREAT SERPL: 10 (ref 12–20)
CALCIUM SERPL-MCNC: 8.9 MG/DL (ref 8.5–10.1)
CHLORIDE SERPL-SCNC: 97 MMOL/L (ref 97–108)
CO2 SERPL-SCNC: 28 MMOL/L (ref 21–32)
CREAT SERPL-MCNC: 0.71 MG/DL (ref 0.7–1.3)
DIFFERENTIAL METHOD BLD: NORMAL
EOSINOPHIL # BLD: 0.3 K/UL (ref 0–0.4)
EOSINOPHIL NFR BLD: 4 % (ref 0–7)
ERYTHROCYTE [DISTWIDTH] IN BLOOD BY AUTOMATED COUNT: 12.4 % (ref 11.5–14.5)
GLOBULIN SER CALC-MCNC: 4.2 G/DL (ref 2–4)
GLUCOSE BLD STRIP.AUTO-MCNC: 341 MG/DL (ref 65–117)
GLUCOSE SERPL-MCNC: 332 MG/DL (ref 65–100)
HCT VFR BLD AUTO: 45.2 % (ref 36.6–50.3)
HGB BLD-MCNC: 15.6 G/DL (ref 12.1–17)
IMM GRANULOCYTES # BLD AUTO: 0 K/UL (ref 0–0.04)
IMM GRANULOCYTES NFR BLD AUTO: 0 % (ref 0–0.5)
LIPASE SERPL-CCNC: 1034 U/L (ref 73–393)
LYMPHOCYTES # BLD: 1.8 K/UL (ref 0.8–3.5)
LYMPHOCYTES NFR BLD: 24 % (ref 12–49)
MCH RBC QN AUTO: 30.2 PG (ref 26–34)
MCHC RBC AUTO-ENTMCNC: 34.5 G/DL (ref 30–36.5)
MCV RBC AUTO: 87.4 FL (ref 80–99)
MONOCYTES # BLD: 0.6 K/UL (ref 0–1)
MONOCYTES NFR BLD: 7 % (ref 5–13)
NEUTS SEG # BLD: 4.9 K/UL (ref 1.8–8)
NEUTS SEG NFR BLD: 64 % (ref 32–75)
NRBC # BLD: 0 K/UL (ref 0–0.01)
NRBC BLD-RTO: 0 PER 100 WBC
PLATELET # BLD AUTO: 223 K/UL (ref 150–400)
PMV BLD AUTO: 11.8 FL (ref 8.9–12.9)
POTASSIUM SERPL-SCNC: 4 MMOL/L (ref 3.5–5.1)
PROT SERPL-MCNC: 7.5 G/DL (ref 6.4–8.2)
RBC # BLD AUTO: 5.17 M/UL (ref 4.1–5.7)
SERVICE CMNT-IMP: ABNORMAL
SODIUM SERPL-SCNC: 130 MMOL/L (ref 136–145)
WBC # BLD AUTO: 7.7 K/UL (ref 4.1–11.1)

## 2022-10-26 PROCEDURE — 85025 COMPLETE CBC W/AUTO DIFF WBC: CPT

## 2022-10-26 PROCEDURE — 83690 ASSAY OF LIPASE: CPT

## 2022-10-26 PROCEDURE — 74011250636 HC RX REV CODE- 250/636: Performed by: PHYSICIAN ASSISTANT

## 2022-10-26 PROCEDURE — 82962 GLUCOSE BLOOD TEST: CPT

## 2022-10-26 PROCEDURE — 36415 COLL VENOUS BLD VENIPUNCTURE: CPT

## 2022-10-26 PROCEDURE — 74177 CT ABD & PELVIS W/CONTRAST: CPT

## 2022-10-26 PROCEDURE — 74011000636 HC RX REV CODE- 636: Performed by: EMERGENCY MEDICINE

## 2022-10-26 PROCEDURE — 80053 COMPREHEN METABOLIC PANEL: CPT

## 2022-10-26 PROCEDURE — 74011000250 HC RX REV CODE- 250: Performed by: PHYSICIAN ASSISTANT

## 2022-10-26 RX ORDER — ONDANSETRON 2 MG/ML
4 INJECTION INTRAMUSCULAR; INTRAVENOUS ONCE
Status: COMPLETED | OUTPATIENT
Start: 2022-10-26 | End: 2022-10-26

## 2022-10-26 RX ORDER — ONDANSETRON 4 MG/1
4 TABLET, ORALLY DISINTEGRATING ORAL
Qty: 10 TABLET | Refills: 0 | Status: SHIPPED | OUTPATIENT
Start: 2022-10-26 | End: 2022-12-01

## 2022-10-26 RX ORDER — IBUPROFEN 800 MG/1
800 TABLET ORAL
Qty: 20 TABLET | Refills: 0 | Status: SHIPPED | OUTPATIENT
Start: 2022-10-26 | End: 2022-10-26 | Stop reason: SDUPTHER

## 2022-10-26 RX ORDER — ONDANSETRON 4 MG/1
4 TABLET, ORALLY DISINTEGRATING ORAL
Qty: 10 TABLET | Refills: 0 | Status: SHIPPED | OUTPATIENT
Start: 2022-10-26 | End: 2022-10-26 | Stop reason: SDUPTHER

## 2022-10-26 RX ORDER — KETOROLAC TROMETHAMINE 30 MG/ML
30 INJECTION, SOLUTION INTRAMUSCULAR; INTRAVENOUS ONCE
Status: COMPLETED | OUTPATIENT
Start: 2022-10-26 | End: 2022-10-26

## 2022-10-26 RX ORDER — IBUPROFEN 800 MG/1
800 TABLET ORAL
Qty: 20 TABLET | Refills: 0 | Status: SHIPPED | OUTPATIENT
Start: 2022-10-26 | End: 2022-10-28

## 2022-10-26 RX ADMIN — SODIUM CHLORIDE 1000 ML: 9 INJECTION, SOLUTION INTRAVENOUS at 12:13

## 2022-10-26 RX ADMIN — KETOROLAC TROMETHAMINE 30 MG: 30 INJECTION, SOLUTION INTRAMUSCULAR at 11:28

## 2022-10-26 RX ADMIN — ONDANSETRON 4 MG: 2 INJECTION INTRAMUSCULAR; INTRAVENOUS at 11:28

## 2022-10-26 RX ADMIN — SODIUM CHLORIDE 1000 ML: 9 INJECTION, SOLUTION INTRAVENOUS at 11:29

## 2022-10-26 RX ADMIN — IOPAMIDOL 100 ML: 755 INJECTION, SOLUTION INTRAVENOUS at 12:36

## 2022-10-26 RX ADMIN — FAMOTIDINE 20 MG: 10 INJECTION, SOLUTION INTRAVENOUS at 11:28

## 2022-10-26 NOTE — ED PROVIDER NOTES
EMERGENCY DEPARTMENT HISTORY AND PHYSICAL EXAM          Date: 10/26/2022  Patient Name: Gilbert Kaplan    History of Presenting Illness     Chief Complaint   Patient presents with    Abdominal Pain         History Provided By: Patient    HPI: Gilbert Kaplan is a 43 y.o. male with a PMH of diabetes, hypertension, and asthma who presents with epigastric pain since Sunday. Last BM on Monday but states it was small and difficult. Patient states he took a bottle of magnesium citrate yesterday with no changes. He does report some nausea but no vomiting or diarrhea, no fevers or chills, no chest pain or shortness of breath, no dysuria or urinary frequency. Patient states eating does make the pain somewhat worse but he was hungry so he made himself to eat. Pain does not radiate to the back    PCP: None    Current Outpatient Medications   Medication Sig Dispense Refill    ondansetron (ZOFRAN ODT) 4 mg disintegrating tablet Take 1 Tablet by mouth every eight (8) hours as needed for Nausea or Vomiting. 10 Tablet 0    ibuprofen (MOTRIN) 800 mg tablet Take 1 Tablet by mouth every six (6) hours as needed for Pain. 20 Tablet 0    lisinopriL (PRINIVIL, ZESTRIL) 10 mg tablet Take 10 mg by mouth daily. metFORMIN (GLUCOPHAGE) 850 mg tablet Take 850 mg by mouth daily. insulin glargine (Lantus U-100 Insulin) 100 unit/mL injection 30 Units by SubCUTAneous route nightly. albuterol (PROVENTIL HFA, VENTOLIN HFA, PROAIR HFA) 90 mcg/actuation inhaler Take  by inhalation. Past History     Past Medical History:  Past Medical History:   Diagnosis Date    Asthma     Diabetes ([facility] [address]     Hypertension        Past Surgical History:  Past Surgical History:   Procedure Laterality Date    COLONOSCOPY N/A 6/23/2022    COLONOSCOPY performed by Alin Calderon MD at [facility] ENDOSCOPY    COLONOSCOPY,HERACLIO VASQUEZ,SNARE  6/23/2022         HX OTHER SURGICAL      hemorrhoids removed       Family History:  History reviewed.  No pertinent family history. Social History:  Social History     Tobacco Use    Smoking status: Every Day     Packs/day: 0.25     Types: Cigarettes    Smokeless tobacco: Never   Substance Use Topics    Alcohol use: No    Drug use: Yes     Types: Marijuana       Allergies:  No Known Allergies      Review of Systems   Review of Systems   Constitutional:  Negative for chills and fever. Respiratory:  Negative for cough and shortness of breath. Cardiovascular:  Negative for chest pain. Gastrointestinal:  Positive for abdominal pain and nausea. Negative for diarrhea and vomiting. Genitourinary:  Negative for dysuria and frequency. Musculoskeletal:  Negative for back pain. Skin: Negative. Allergic/Immunologic: Negative for immunocompromised state. Neurological:  Negative for dizziness and speech difficulty. All other systems reviewed and are negative. Physical Exam     Vitals:    10/26/22 1330 10/26/22 1345 10/26/22 1358 10/26/22 1400   BP: (!) 169/112 (!) 155/110  (!) 150/103   Pulse: 69 67 72 69   Resp: 15 17 17 17   Temp:       SpO2: 100% 100% 100% 100%   Weight:       Height:         Physical Exam  Vitals and nursing note reviewed. Constitutional:       General: He is not in acute distress. Appearance: He is well-developed. He is not ill-appearing or toxic-appearing. HENT:      Head: Normocephalic and atraumatic. Mouth/Throat:      Pharynx: No oropharyngeal exudate. Eyes:      Conjunctiva/sclera: Conjunctivae normal.   Cardiovascular:      Rate and Rhythm: Normal rate and regular rhythm. Heart sounds: Normal heart sounds. Pulmonary:      Effort: Pulmonary effort is normal. No respiratory distress. Breath sounds: Normal breath sounds. No stridor. No wheezing, rhonchi or rales. Abdominal:      General: Bowel sounds are normal.      Palpations: Abdomen is soft. Tenderness: There is abdominal tenderness in the epigastric area. There is guarding. There is no rebound. Musculoskeletal:         General: Normal range of motion. Skin:     General: Skin is warm and dry. Neurological:      Mental Status: He is alert and oriented to person, place, and time. Psychiatric:         Mood and Affect: Mood normal.         Behavior: Behavior normal.         Thought Content: Thought content normal.         Judgment: Judgment normal.             Medical Decision Making   I am the first provider for this patient. I reviewed the vital signs, available nursing notes, past medical history, past surgical history, family history and social history. Vital Signs-Reviewed the patient's vital signs. Records Reviewed: Nursing Notes and Old Medical Records    Provider Notes (Medical Decision Making):   Patient presents with epigastric abdominal pain since Sunday. DDx: Suspicion for pancreatitis possibly secondary to hyperglycemia other differential: Cholecystitis, biliary obstruction, constipation, PUD, low suspicion for AAA, descending dissection, or ACS. Will get labs to start and possibly CT Abdomen/Pelvs v U/S.       ED Course as of 10/26/22 1418   Wed Oct 26, 2022   1209 Discussed lab results with patient and plan for CT. Advised of diagnosis of pancreatitis. Patient also advised of possible admission. Patient does report pain is somewhat improved and states that he is hungry. [AH]   1301 Discussed CT findings with patient. He states his pain has resolved at this point. We discussed admission versus discharge. Patient would like to go home so we will do a p.o. challenge to see if he is able to tolerate p.o. and if his pain worsens we discussed possible admission for pain control and better management of diabetes. [AH]   1358 Patient was able to tolerate p.o. without any recurrence of abdominal pain at this time. He was given strict precautions to return should pain worsen or persistent nausea and vomiting occur.   Patient offered pain medicines to go home with but states that he did not want to take any pain pills as his brother  from overdose [AH]      ED Course User Index  [AH] Darcy Alexsi PA-C            Procedures:  Procedures    Diagnostic Study Results     Labs -     Recent Results (from the past 12 hour(s))   GLUCOSE, POC    Collection Time: 10/26/22 11:16 AM   Result Value Ref Range    Glucose (POC) 341 (H) 65 - 117 mg/dL    Performed by Teofilo William EDT    CBC WITH AUTOMATED DIFF    Collection Time: 10/26/22 11:22 AM   Result Value Ref Range    WBC 7.7 4.1 - 11.1 K/uL    RBC 5.17 4.10 - 5.70 M/uL    HGB 15.6 12.1 - 17.0 g/dL    HCT 45.2 36.6 - 50.3 %    MCV 87.4 80.0 - 99.0 FL    MCH 30.2 26.0 - 34.0 PG    MCHC 34.5 30.0 - 36.5 g/dL    RDW 12.4 11.5 - 14.5 %    PLATELET 243 649 - 465 K/uL    MPV 11.8 8.9 - 12.9 FL    NRBC 0.0 0  WBC    ABSOLUTE NRBC 0.00 0.00 - 0.01 K/uL    NEUTROPHILS 64 32 - 75 %    LYMPHOCYTES 24 12 - 49 %    MONOCYTES 7 5 - 13 %    EOSINOPHILS 4 0 - 7 %    BASOPHILS 1 0 - 1 %    IMMATURE GRANULOCYTES 0 0.0 - 0.5 %    ABS. NEUTROPHILS 4.9 1.8 - 8.0 K/UL    ABS. LYMPHOCYTES 1.8 0.8 - 3.5 K/UL    ABS. MONOCYTES 0.6 0.0 - 1.0 K/UL    ABS. EOSINOPHILS 0.3 0.0 - 0.4 K/UL    ABS. BASOPHILS 0.1 0.0 - 0.1 K/UL    ABS. IMM. GRANS. 0.0 0.00 - 0.04 K/UL    DF AUTOMATED     METABOLIC PANEL, COMPREHENSIVE    Collection Time: 10/26/22 11:22 AM   Result Value Ref Range    Sodium 130 (L) 136 - 145 mmol/L    Potassium 4.0 3.5 - 5.1 mmol/L    Chloride 97 97 - 108 mmol/L    CO2 28 21 - 32 mmol/L    Anion gap 5 5 - 15 mmol/L    Glucose 332 (H) 65 - 100 mg/dL    BUN 7 6 - 20 MG/DL    Creatinine 0.71 0.70 - 1.30 MG/DL    BUN/Creatinine ratio 10 (L) 12 - 20      eGFR >60 >60 ml/min/1.73m2    Calcium 8.9 8.5 - 10.1 MG/DL    Bilirubin, total 0.7 0.2 - 1.0 MG/DL    ALT (SGPT) 22 12 - 78 U/L    AST (SGOT) 8 (L) 15 - 37 U/L    Alk.  phosphatase 116 45 - 117 U/L    Protein, total 7.5 6.4 - 8.2 g/dL    Albumin 3.3 (L) 3.5 - 5.0 g/dL    Globulin 4.2 (H) 2.0 - 4.0 g/dL    A-G Ratio 0.8 (L) 1.1 - 2.2     LIPASE    Collection Time: 10/26/22 11:22 AM   Result Value Ref Range    Lipase 1,034 (H) 73 - 393 U/L       Radiologic Studies -   CT ABD PELV W CONT   Final Result   Acute uncomplicated pancreatitis. CT Results  (Last 48 hours)                 10/26/22 1236  CT ABD PELV W CONT Final result    Impression:  Acute uncomplicated pancreatitis. Narrative:  EXAM: CT ABD PELV W CONT       INDICATION: epigstric pain, pancreatitis       COMPARISON: None        CONTRAST: 100 mL of Isovue-370. ORAL CONTRAST: None       TECHNIQUE:    Following the uneventful intravenous administration of contrast, thin axial   images were obtained through the abdomen and pelvis. Coronal and sagittal   reconstructions were generated. CT dose reduction was achieved through use of a   standardized protocol tailored for this examination and automatic exposure   control for dose modulation. FINDINGS:    LOWER THORAX: No significant abnormality in the incidentally imaged lower chest.   LIVER: No mass. BILIARY TREE: Gallbladder is within normal limits. CBD is not dilated. SPLEEN: within normal limits. PANCREAS: Interstitial edema and mild surrounding fat stranding of the   pancreatic head. ADRENALS: Unremarkable. KIDNEYS: No mass, calculus, or hydronephrosis. STOMACH: Unremarkable. SMALL BOWEL: No dilatation or wall thickening. COLON: No dilatation or wall thickening. APPENDIX: Unremarkable. PERITONEUM: No ascites or pneumoperitoneum. RETROPERITONEUM: No lymphadenopathy or aortic aneurysm. REPRODUCTIVE ORGANS: Normal prostate. URINARY BLADDER: No mass or calculus. BONES: No destructive bone lesion. ABDOMINAL WALL: No mass or hernia. ADDITIONAL COMMENTS: N/A                 CXR Results  (Last 48 hours)      None                Disposition:  Discharged    DISCHARGE NOTE:   2:18 PM      Care plan outlined and precautions discussed.   Patient has no new complaints, changes, or physical findings. Results of labs and imaging were reviewed with the patient. All medications were reviewed with the patient; will d/c home. All of pt's questions and concerns were addressed. Patient was instructed and agrees to follow up with PCP, as well as to return to the ED upon further deterioration. Patient is ready to go home. Follow-up Information       Follow up With Specialties Details Why Contact Info    Your PCP  On 11/16/2022 as scheduled     [facility] EMERGENCY DEPT Emergency Medicine  If symptoms worsen [place]            Current Discharge Medication List        START taking these medications    Details   ondansetron (ZOFRAN ODT) 4 mg disintegrating tablet Take 1 Tablet by mouth every eight (8) hours as needed for Nausea or Vomiting. Qty: 10 Tablet, Refills: 0  Start date: 10/26/2022           CONTINUE these medications which have CHANGED    Details   ibuprofen (MOTRIN) 800 mg tablet Take 1 Tablet by mouth every six (6) hours as needed for Pain. Qty: 20 Tablet, Refills: 0  Start date: 10/26/2022               Please note that this dictation was completed with Dragon, computer voice recognition software. Quite often unanticipated grammatical, syntax, homophones, and other interpretive errors are inadvertently transcribed by the computer software. Please disregard these errors. Additionally, please excuse any errors that have escaped final proofreading. Diagnosis     Clinical Impression:   1. Acute pancreatitis without infection or necrosis, unspecified pancreatitis type    2. Hyperglycemia    3.  Hyponatremia

## 2022-10-26 NOTE — ED TRIAGE NOTES
Triage Note: Patient arrives to ER complaining of upper abdominal pain since Sunday morning. Last BM was yesterday, but states it was small and difficult. Patient took a bottle of Mag citrate with no results. Denies nausea and vomiting.

## 2022-10-26 NOTE — ED NOTES
Pt presents to ED complaining of mid to upper abdominal pain starting on Sunday. Patient stated he has not had a BM since Sunday. Per patient took Mag citrate and no BM. Pt c/o mild nausea. Pt reports that he had a coloscopy 2 months ago and polys found intermittent bleeding in stool. Pt is alert and oriented x 4, RR even and unlabored, skin is warm and dry. Assessment completed and pt updated on plan of care. Call bell in reach. Emergency Department Nursing Plan of Care       The Nursing Plan of Care is developed from the Nursing assessment and Emergency Department Attending provider initial evaluation. The plan of care may be reviewed in the ED Provider note.     The Plan of Care was developed with the following considerations:   Patient / Family readiness to learn indicated by:verbalized understanding  Persons(s) to be included in education: patient  Barriers to Learning/Limitations:No    Signed     Shahriar Hawley RN    10/26/2022

## 2022-10-27 ENCOUNTER — APPOINTMENT (OUTPATIENT)
Dept: ULTRASOUND IMAGING | Age: 42
DRG: 282 | End: 2022-10-27
Attending: STUDENT IN AN ORGANIZED HEALTH CARE EDUCATION/TRAINING PROGRAM
Payer: COMMERCIAL

## 2022-10-27 ENCOUNTER — HOSPITAL ENCOUNTER (INPATIENT)
Age: 42
LOS: 1 days | Discharge: HOME | DRG: 282 | End: 2022-10-28
Attending: EMERGENCY MEDICINE | Admitting: STUDENT IN AN ORGANIZED HEALTH CARE EDUCATION/TRAINING PROGRAM
Payer: COMMERCIAL

## 2022-10-27 DIAGNOSIS — K85.10 ACUTE BILIARY PANCREATITIS WITHOUT INFECTION OR NECROSIS: ICD-10-CM

## 2022-10-27 DIAGNOSIS — K85.90 ACUTE PANCREATITIS WITHOUT INFECTION OR NECROSIS, UNSPECIFIED PANCREATITIS TYPE: Primary | ICD-10-CM

## 2022-10-27 LAB
CHOLEST SERPL-MCNC: 215 MG/DL
EST. AVERAGE GLUCOSE BLD GHB EST-MCNC: 306 MG/DL
GLUCOSE BLD STRIP.AUTO-MCNC: 216 MG/DL (ref 65–117)
GLUCOSE BLD STRIP.AUTO-MCNC: 258 MG/DL (ref 65–117)
GLUCOSE BLD STRIP.AUTO-MCNC: 264 MG/DL (ref 65–117)
HBA1C MFR BLD: 12.3 % (ref 4–5.6)
HDLC SERPL-MCNC: 21 MG/DL
HDLC SERPL: 10.2 (ref 0–5)
LDLC SERPL CALC-MCNC: 159.6 MG/DL (ref 0–100)
SERVICE CMNT-IMP: ABNORMAL
TRIGL SERPL-MCNC: 172 MG/DL (ref ?–150)
VLDLC SERPL CALC-MCNC: 34.4 MG/DL

## 2022-10-27 PROCEDURE — 96375 TX/PRO/DX INJ NEW DRUG ADDON: CPT

## 2022-10-27 PROCEDURE — 99222 1ST HOSP IP/OBS MODERATE 55: CPT | Performed by: SURGERY

## 2022-10-27 PROCEDURE — G0378 HOSPITAL OBSERVATION PER HR: HCPCS

## 2022-10-27 PROCEDURE — 76705 ECHO EXAM OF ABDOMEN: CPT

## 2022-10-27 PROCEDURE — 82962 GLUCOSE BLOOD TEST: CPT

## 2022-10-27 PROCEDURE — 65270000032 HC RM SEMIPRIVATE

## 2022-10-27 PROCEDURE — 36415 COLL VENOUS BLD VENIPUNCTURE: CPT

## 2022-10-27 PROCEDURE — 74011250637 HC RX REV CODE- 250/637: Performed by: INTERNAL MEDICINE

## 2022-10-27 PROCEDURE — 96372 THER/PROPH/DIAG INJ SC/IM: CPT

## 2022-10-27 PROCEDURE — 74011000250 HC RX REV CODE- 250: Performed by: STUDENT IN AN ORGANIZED HEALTH CARE EDUCATION/TRAINING PROGRAM

## 2022-10-27 PROCEDURE — 96374 THER/PROPH/DIAG INJ IV PUSH: CPT

## 2022-10-27 PROCEDURE — 74011250636 HC RX REV CODE- 250/636: Performed by: STUDENT IN AN ORGANIZED HEALTH CARE EDUCATION/TRAINING PROGRAM

## 2022-10-27 PROCEDURE — 74011250637 HC RX REV CODE- 250/637: Performed by: STUDENT IN AN ORGANIZED HEALTH CARE EDUCATION/TRAINING PROGRAM

## 2022-10-27 PROCEDURE — 74011636637 HC RX REV CODE- 636/637: Performed by: STUDENT IN AN ORGANIZED HEALTH CARE EDUCATION/TRAINING PROGRAM

## 2022-10-27 PROCEDURE — 96376 TX/PRO/DX INJ SAME DRUG ADON: CPT

## 2022-10-27 PROCEDURE — 99285 EMERGENCY DEPT VISIT HI MDM: CPT

## 2022-10-27 PROCEDURE — 83036 HEMOGLOBIN GLYCOSYLATED A1C: CPT

## 2022-10-27 PROCEDURE — 74011250636 HC RX REV CODE- 250/636: Performed by: EMERGENCY MEDICINE

## 2022-10-27 PROCEDURE — 80061 LIPID PANEL: CPT

## 2022-10-27 RX ORDER — INSULIN LISPRO 100 [IU]/ML
1-10 INJECTION, SOLUTION INTRAVENOUS; SUBCUTANEOUS
Status: DISCONTINUED | OUTPATIENT
Start: 2022-10-27 | End: 2022-10-28 | Stop reason: HOSPADM

## 2022-10-27 RX ORDER — DEXTROSE MONOHYDRATE 100 MG/ML
0-250 INJECTION, SOLUTION INTRAVENOUS AS NEEDED
Status: DISCONTINUED | OUTPATIENT
Start: 2022-10-27 | End: 2022-10-28 | Stop reason: HOSPADM

## 2022-10-27 RX ORDER — HYDROMORPHONE HYDROCHLORIDE 1 MG/ML
1 INJECTION, SOLUTION INTRAMUSCULAR; INTRAVENOUS; SUBCUTANEOUS
Status: COMPLETED | OUTPATIENT
Start: 2022-10-27 | End: 2022-10-27

## 2022-10-27 RX ORDER — OXYCODONE HYDROCHLORIDE 5 MG/1
10 TABLET ORAL
Status: DISCONTINUED | OUTPATIENT
Start: 2022-10-27 | End: 2022-10-28 | Stop reason: HOSPADM

## 2022-10-27 RX ORDER — INSULIN GLARGINE 100 [IU]/ML
0.2 INJECTION, SOLUTION SUBCUTANEOUS DAILY
Status: DISCONTINUED | OUTPATIENT
Start: 2022-10-27 | End: 2022-10-28 | Stop reason: HOSPADM

## 2022-10-27 RX ORDER — SODIUM CHLORIDE, SODIUM LACTATE, POTASSIUM CHLORIDE, CALCIUM CHLORIDE 600; 310; 30; 20 MG/100ML; MG/100ML; MG/100ML; MG/100ML
200 INJECTION, SOLUTION INTRAVENOUS CONTINUOUS
Status: DISCONTINUED | OUTPATIENT
Start: 2022-10-27 | End: 2022-10-27 | Stop reason: DRUGHIGH

## 2022-10-27 RX ORDER — ONDANSETRON 2 MG/ML
4 INJECTION INTRAMUSCULAR; INTRAVENOUS
Status: DISCONTINUED | OUTPATIENT
Start: 2022-10-27 | End: 2022-10-28 | Stop reason: HOSPADM

## 2022-10-27 RX ORDER — SODIUM CHLORIDE 0.9 % (FLUSH) 0.9 %
5-40 SYRINGE (ML) INJECTION EVERY 8 HOURS
Status: DISCONTINUED | OUTPATIENT
Start: 2022-10-27 | End: 2022-10-27 | Stop reason: SDUPTHER

## 2022-10-27 RX ORDER — ACETAMINOPHEN 325 MG/1
650 TABLET ORAL
Status: DISCONTINUED | OUTPATIENT
Start: 2022-10-27 | End: 2022-10-27

## 2022-10-27 RX ORDER — ACETAMINOPHEN 650 MG/1
650 SUPPOSITORY RECTAL
Status: DISCONTINUED | OUTPATIENT
Start: 2022-10-27 | End: 2022-10-27

## 2022-10-27 RX ORDER — SODIUM CHLORIDE 0.9 % (FLUSH) 0.9 %
5-40 SYRINGE (ML) INJECTION AS NEEDED
Status: DISCONTINUED | OUTPATIENT
Start: 2022-10-27 | End: 2022-10-28 | Stop reason: HOSPADM

## 2022-10-27 RX ORDER — SODIUM CHLORIDE 0.9 % (FLUSH) 0.9 %
5-40 SYRINGE (ML) INJECTION EVERY 8 HOURS
Status: DISCONTINUED | OUTPATIENT
Start: 2022-10-27 | End: 2022-10-28 | Stop reason: HOSPADM

## 2022-10-27 RX ORDER — IBUPROFEN 200 MG
4 TABLET ORAL AS NEEDED
Status: DISCONTINUED | OUTPATIENT
Start: 2022-10-27 | End: 2022-10-28 | Stop reason: HOSPADM

## 2022-10-27 RX ORDER — NALOXONE HYDROCHLORIDE 0.4 MG/ML
0.4 INJECTION, SOLUTION INTRAMUSCULAR; INTRAVENOUS; SUBCUTANEOUS AS NEEDED
Status: DISCONTINUED | OUTPATIENT
Start: 2022-10-27 | End: 2022-10-28 | Stop reason: HOSPADM

## 2022-10-27 RX ORDER — PROCHLORPERAZINE EDISYLATE 5 MG/ML
5 INJECTION INTRAMUSCULAR; INTRAVENOUS
Status: DISCONTINUED | OUTPATIENT
Start: 2022-10-27 | End: 2022-10-28 | Stop reason: HOSPADM

## 2022-10-27 RX ORDER — ONDANSETRON 2 MG/ML
4 INJECTION INTRAMUSCULAR; INTRAVENOUS
Status: COMPLETED | OUTPATIENT
Start: 2022-10-27 | End: 2022-10-27

## 2022-10-27 RX ORDER — ACETAMINOPHEN 500 MG
1000 TABLET ORAL
Status: DISCONTINUED | OUTPATIENT
Start: 2022-10-27 | End: 2022-10-28 | Stop reason: HOSPADM

## 2022-10-27 RX ORDER — SODIUM CHLORIDE, SODIUM LACTATE, POTASSIUM CHLORIDE, CALCIUM CHLORIDE 600; 310; 30; 20 MG/100ML; MG/100ML; MG/100ML; MG/100ML
100 INJECTION, SOLUTION INTRAVENOUS CONTINUOUS
Status: DISCONTINUED | OUTPATIENT
Start: 2022-10-27 | End: 2022-10-28

## 2022-10-27 RX ORDER — OXYCODONE HYDROCHLORIDE 5 MG/1
5 TABLET ORAL
Status: DISCONTINUED | OUTPATIENT
Start: 2022-10-27 | End: 2022-10-28 | Stop reason: HOSPADM

## 2022-10-27 RX ORDER — ENOXAPARIN SODIUM 100 MG/ML
30 INJECTION SUBCUTANEOUS EVERY 12 HOURS
Status: DISCONTINUED | OUTPATIENT
Start: 2022-10-27 | End: 2022-10-28 | Stop reason: HOSPADM

## 2022-10-27 RX ORDER — LANOLIN ALCOHOL/MO/W.PET/CERES
3 CREAM (GRAM) TOPICAL
Status: DISCONTINUED | OUTPATIENT
Start: 2022-10-27 | End: 2022-10-28 | Stop reason: HOSPADM

## 2022-10-27 RX ORDER — POLYETHYLENE GLYCOL 3350 17 G/17G
17 POWDER, FOR SOLUTION ORAL DAILY PRN
Status: DISCONTINUED | OUTPATIENT
Start: 2022-10-27 | End: 2022-10-28 | Stop reason: HOSPADM

## 2022-10-27 RX ORDER — SODIUM CHLORIDE, SODIUM LACTATE, POTASSIUM CHLORIDE, CALCIUM CHLORIDE 600; 310; 30; 20 MG/100ML; MG/100ML; MG/100ML; MG/100ML
1000 INJECTION, SOLUTION INTRAVENOUS CONTINUOUS
Status: DISCONTINUED | OUTPATIENT
Start: 2022-10-27 | End: 2022-10-27

## 2022-10-27 RX ORDER — HYDROMORPHONE HYDROCHLORIDE 1 MG/ML
0.2 INJECTION, SOLUTION INTRAMUSCULAR; INTRAVENOUS; SUBCUTANEOUS
Status: DISCONTINUED | OUTPATIENT
Start: 2022-10-27 | End: 2022-10-28

## 2022-10-27 RX ORDER — ONDANSETRON 4 MG/1
4 TABLET, ORALLY DISINTEGRATING ORAL
Status: DISCONTINUED | OUTPATIENT
Start: 2022-10-27 | End: 2022-10-27

## 2022-10-27 RX ADMIN — SODIUM CHLORIDE, PRESERVATIVE FREE 10 ML: 5 INJECTION INTRAVENOUS at 21:35

## 2022-10-27 RX ADMIN — SODIUM CHLORIDE, PRESERVATIVE FREE 10 ML: 5 INJECTION INTRAVENOUS at 14:14

## 2022-10-27 RX ADMIN — HYDROMORPHONE HYDROCHLORIDE 0.2 MG: 1 INJECTION, SOLUTION INTRAMUSCULAR; INTRAVENOUS; SUBCUTANEOUS at 14:25

## 2022-10-27 RX ADMIN — HYDROMORPHONE HYDROCHLORIDE 1 MG: 1 INJECTION, SOLUTION INTRAMUSCULAR; INTRAVENOUS; SUBCUTANEOUS at 08:02

## 2022-10-27 RX ADMIN — Medication 3 MG: at 23:00

## 2022-10-27 RX ADMIN — ENOXAPARIN SODIUM 30 MG: 100 INJECTION SUBCUTANEOUS at 11:03

## 2022-10-27 RX ADMIN — ENOXAPARIN SODIUM 30 MG: 100 INJECTION SUBCUTANEOUS at 21:35

## 2022-10-27 RX ADMIN — SODIUM CHLORIDE, POTASSIUM CHLORIDE, SODIUM LACTATE AND CALCIUM CHLORIDE 1000 ML: 600; 310; 30; 20 INJECTION, SOLUTION INTRAVENOUS at 08:09

## 2022-10-27 RX ADMIN — Medication 5 UNITS: at 17:20

## 2022-10-27 RX ADMIN — SODIUM CHLORIDE, POTASSIUM CHLORIDE, SODIUM LACTATE AND CALCIUM CHLORIDE 200 ML/HR: 600; 310; 30; 20 INJECTION, SOLUTION INTRAVENOUS at 11:03

## 2022-10-27 RX ADMIN — Medication 21 UNITS: at 11:03

## 2022-10-27 RX ADMIN — POLYETHYLENE GLYCOL 3350 17 G: 17 POWDER, FOR SOLUTION ORAL at 14:25

## 2022-10-27 RX ADMIN — ONDANSETRON 4 MG: 2 INJECTION INTRAMUSCULAR; INTRAVENOUS at 08:02

## 2022-10-27 RX ADMIN — Medication 5 UNITS: at 12:15

## 2022-10-27 RX ADMIN — SODIUM CHLORIDE, POTASSIUM CHLORIDE, SODIUM LACTATE AND CALCIUM CHLORIDE 200 ML/HR: 600; 310; 30; 20 INJECTION, SOLUTION INTRAVENOUS at 16:19

## 2022-10-27 RX ADMIN — Medication 2 UNITS: at 21:35

## 2022-10-27 RX ADMIN — SODIUM CHLORIDE, PRESERVATIVE FREE 10 ML: 5 INJECTION INTRAVENOUS at 11:03

## 2022-10-27 NOTE — CONSULTS
Patient seen at request of Dr. Felipe Chacon. Information obtained from patient and review of chart. Bharat Curry is an 43 y.o. male who was recently admitted with acute pancreatitis. Mr. Cranston Dandy tells me that he began experiencing epigastric abdominal pain several days ago. According to Mr. Cranston Dandy, the pain began after eating fatty food. Associated abdominal bloating and nausea. No emesis. No NSAID use. Mr. Cranston Dandy reports no diarrhea, melena or blood per rectum. No h/o deshaun colored stool or tea colored urine. No shortness of breath or chest pain. Mr. Cranston Dandy denies alcohol use. He has otherwise been in his usual state of health. CT scan abdomen/pelvis with IV contrast - 58/16/0929 - Acute uncomplicated pancreatitis. Abdominal ultrasound - 10/27/2022 - Possible gallbladder polyp versus adherent nonshadowing calculus or sludge. No biliary ductal dilatation. The visualized portions of the pancreas are normal, except for indistinctness around the pancreatic head, consistent with the recent diagnosis. Otherwise unremarkable right upper quadrant ultrasound for age. Allergies - Patient has no known allergies. Meds - Reviewed. PMH -   Past Medical History:   Diagnosis Date    Asthma     Diabetes ([address]     Hypertension      PSH -   Past Surgical History:   Procedure Laterality Date    COLONOSCOPY N/A 6/23/2022    COLONOSCOPY performed by Isadora Mercer MD at [facility] ENDOSCOPY    COLONOSCOPY,HERACLIO VASQUEZ,SKYLAR  6/23/2022         HX OTHER SURGICAL      hemorrhoids removed     Fam Hx - History reviewed. No pertinent family history. Soc Hx -   Social History     Tobacco Use    Smoking status: Every Day     Packs/day: 0.25     Types: Cigars, Cigarettes    Smokeless tobacco: Never   Substance Use Topics    Alcohol use: No     Patient is a well developed, well nourished man in no acute distress. Tm 98.3 HR: 65 BP: 149/91 Resp Rate: 16 100% sat on room air. HEENT: Anicteric.   Neck: Supple without  palpable lymphadenopathy. Cor: RRR. Lungs: Bilateral breath sounds. Clear to auscultation. Abd: Soft. Non distended. Epigastric tenderness. No guarding or rebound. Ext: No edema. Neuro: Grossly Non focal.     Labs -   Recent Results (from the past 24 hour(s))   HEMOGLOBIN A1C WITH EAG    Collection Time: 10/27/22 11:11 AM   Result Value Ref Range    Hemoglobin A1c 12.3 (H) 4.0 - 5.6 %    Est. average glucose 306 mg/dL   LIPID PANEL    Collection Time: 10/27/22 11:11 AM   Result Value Ref Range    Cholesterol, total 215 (H) <200 MG/DL    Triglyceride 172 (H) <150 MG/DL    HDL Cholesterol 21 MG/DL    LDL, calculated 159.6 (H) 0 - 100 MG/DL    VLDL, calculated 34.4 MG/DL    CHOL/HDL Ratio 10.2 (H) 0.0 - 5.0     GLUCOSE, POC    Collection Time: 10/27/22 11:40 AM   Result Value Ref Range    Glucose (POC) 258 (H) 65 - 117 mg/dL    Performed by Priya Nathan (PCT)      Imaging studies - Reviewed. Imp: Mr. Nakul Nunes is a 43 y.o. male with acute pancreatitis most likely biliary pancreatitis. Plan: 1. Clear liquid diet as tolerated and advance to low fat. 2. Follow lipase. 3. Will decrease IVF to 100 ml/hour. 4. Anti-emetics and pain medication as ordered. 5. Do not believe that there is a need for abx therapy at this time. 6. Diabetes control. 7. Mr. Nakul Nunes should ultimately benefit from cholecystectomy. 8. Plans per Dr. Tawanna Camarillo. Following.

## 2022-10-27 NOTE — PROGRESS NOTES
Problem: Falls - Risk of  Goal: *Absence of Falls  Description: Document Cherylle Cockayne Fall Risk and appropriate interventions in the flowsheet.   Outcome: Progressing Towards Goal  Note: Fall Risk Interventions:            Medication Interventions: Teach patient to arise slowly                   Problem: Pancreatitis  Goal: *Control of acute pain  Outcome: Progressing Towards Goal  Goal: *Absence of nausea/vomiting  Outcome: Progressing Towards Goal  Goal: *Optimize nutritional status  Outcome: Progressing Towards Goal  Goal: *Labs within defined limits  Outcome: Progressing Towards Goal

## 2022-10-27 NOTE — PROGRESS NOTES
0930) TRANSFER - IN REPORT:    Verbal report received from CINTHYA HONG RN (name) on Kurt Nelson  being received from ED (unit) for routine progression of care      Report consisted of patients Situation, Background, Assessment and   Recommendations(SBAR). Information from the following report(s) SBAR, Kardex, Intake/Output, MAR, and Recent Results was reviewed with the receiving nurse. Opportunity for questions and clarification was provided. Assessment completed upon patients arrival to unit and care assumed. 9546) Pt arrived on unit and vital signs stable. Pt placed on tele box # 5 and running NSR. Pt stated pain started Saturday night, was here in ED yesterday and d/c home. Pt stated pain progressively got worse overnight and brought himself back in. Pt denies ETOH. Discussed home insulin. Pt resting in bed at this time and stated no additional needs. 1025) Attempted to give scheduled meds. Ultrasound at the bedside at this time. Will attempt as able. 1105) Scheduled meds given. LR hung and infusing at 200 ml/hr. Home dose of lantus given. IV flushed and patent. PT educated on lovenox therapy. Labs drawn and sent down. Pt stated no additional needs at this time and left resting in bed.     1215) Pt reassessed and no changes to note. Vital signs obtained and stable. 5 units insulin given for . Pt sitting up in bed eating at this time. 1320) Pt asleep in bed at this time. Rise and fall of chest noted. Wife at the bedside and stated no needs at this time. 1415) IV flushed and patent. LR still infusing at 200 ml/hr. Pt stated no additional needs. 1525) Pain reassessed and pt stated he has \"no pain at this time\". Pt stated no additional needs at this time. 1620) Pt reassessed and no changes to note. Vital signs obtained and stable. BP elevated at this time but pt states he is stressed that his friend has to leave at 2000.  Pt resting in bed and stated he had no additional Probiotics (allign ), if not get better needs stool testing    needs at this time. 1720) 5 units of sliding scale given for . Pt sitting up in bed eating at this time. 1830) Pt resting in bed and stated no needs at this time. Family present at the bedside. 1910) Bedside shift change report given to Praveen Garces RN (oncoming nurse) by Willian Talavera RN (offgoing nurse). Report included the following information SBAR, Kardex, Intake/Output, MAR, and Recent Results.

## 2022-10-27 NOTE — ED NOTES
Emergency Department Nursing Plan of Care       The Nursing Plan of Care is developed from the Nursing assessment and Emergency Department Attending provider initial evaluation. The plan of care may be reviewed in the ED Provider note.     The Plan of Care was developed with the following considerations:   Patient / Family readiness to learn indicated by:verbalized understanding  Persons(s) to be included in education: patient  Barriers to Learning/Limitations:No    Signed     Antione Campos RN    10/27/2022   8:19 AM
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
## 2022-10-27 NOTE — PROGRESS NOTES
[facility] Dosing Services  Automatic adjustment of enoxaparin  Dr. Tawanna Camarillo  Indication: VTE prophylaxis    Wt Readings from Last 1 Encounters:   10/27/22 103.2 kg (227 lb 8.2 oz)       Ht Readings from Last 1 Encounters:   10/27/22 193 cm (76\")         Pharmacist made change to enoxaparin therapy based on weight  Order changed to enoxaparin 30 mg SUBCUT every 12 hours    Previous Dose Enoxaparin 40 mg SUBCUT daily   Creatinine Clearance Estimated Creatinine Clearance: 166.4 mL/min (based on SCr of 0.71 mg/dL). Creatinine Lab Results   Component Value Date/Time    Creatinine 0.71 10/26/2022 11:22 AM       Platelet Lab Results   Component Value Date/Time    PLATELET 486 82/89/1648 11:22 AM      H/H Lab Results   Component Value Date/Time    HGB 15.6 10/26/2022 11:22 AM        Epidural Catheter? No  Other anticoagulants: None  Relevant drug interactions: None     Pharmacy to automatically make dose adjustment for renal dysfunction (creatinine clearance less than 30 mL/min)  Pharmacy to automatically make dose adjustment for weight (heparin for wt < 51 kg)  Pharmacy to make dose rounding adjustments per [facility] [employer] dose adjustment scale. Pharmacy will monitor patients progress, make dose adjustment as needed per changing renal function, and communicate further recommendations regarding patients anticoagulation therapy with prescriber.     Thank you,  Cheyanne Ceballos, PharmD, BCPS  [phone]      Table from JOHN [address] 73 5.7.72

## 2022-10-27 NOTE — ED NOTES
TRANSFER - OUT REPORT:    Verbal report given to ADAN Faulkner(name) on Min Blum  being transferred to MED SURG(unit) for routine progression of care       Report consisted of patients Situation, Background, Assessment and   Recommendations(SBAR). Information from the following report(s) SBAR, ED Summary and OR Summary was reviewed with the receiving nurse. Lines:   Peripheral IV 10/27/22 Left Antecubital (Active)   Site Assessment Clean, dry, & intact 10/27/22 0802   Phlebitis Assessment 0 10/27/22 0802   Infiltration Assessment 0 10/27/22 0802   Dressing Status Clean, dry, & intact 10/27/22 0802   Hub Color/Line Status Pink 10/27/22 0802        Opportunity for questions and clarification was provided.       Patient transported with:   Registered Nurse

## 2022-10-27 NOTE — PROGRESS NOTES
[facility]: MED RECONCILIATION    Comments/Recommendations:   Patient was interviewed via phone and was a good historian. Patient reports that he just received the Ibuprofen 800 mg and Ondansetron 4 mg ODT and have not taken them yet. Will keep on PTA med list.     Medications added:     None    Medications removed:    None    Medications adjusted:    None    Information obtained from: Patient, RxQuery Refill History, [facility]       Allergies: Patient has no known allergies. Prior to Admission Medications:   Prior to Admission Medications   Prescriptions Last Dose Informant Patient Reported? Taking? albuterol (PROVENTIL HFA, VENTOLIN HFA, PROAIR HFA) 90 mcg/actuation inhaler 10/26/2022  Yes Yes   Sig: Take 2 Puffs by inhalation every four (4) hours as needed. ibuprofen (MOTRIN) 800 mg tablet 10/26/2022  No Yes   Sig: Take 1 Tablet by mouth every six (6) hours as needed for Pain. insulin glargine (LANTUS) 100 unit/mL injection 10/26/2022  Yes Yes   Si Units by SubCUTAneous route nightly. lisinopriL (PRINIVIL, ZESTRIL) 10 mg tablet 10/26/2022  Yes Yes   Sig: Take 10 mg by mouth daily. metFORMIN (GLUCOPHAGE) 850 mg tablet 10/20/2022  Yes Yes   Sig: Take 850 mg by mouth daily. ondansetron (ZOFRAN ODT) 4 mg disintegrating tablet 10/26/2022  No Yes   Sig: Take 1 Tablet by mouth every eight (8) hours as needed for Nausea or Vomiting.             THA Holm   Contact: [phone]

## 2022-10-27 NOTE — ED PROVIDER NOTES
EMERGENCY DEPARTMENT HISTORY AND PHYSICAL EXAM      Date: 10/27/2022  Patient Name: Otilio Bradley    History of Presenting Illness     Chief Complaint   Patient presents with    Abdominal Pain     Pt states seen yesterday at [address] [place] [address] ED diagnosed with pancreatitis. Upper mid abd pain, radiating to back. History Provided By: Patient    HPI: Otilio Bradley, 43 y.o. male presents to the ED with cc of aminal pain and nausea for the past few days. Patient was seen yesterday in this department and diagnosed with acute pancreatitis, patient was offered admission to the hospital but he refused and wanted to go home, after being home pain got worse with more nausea patient decided to return to the emergency department to be admitted. There are no other associated symptoms, patient concerns, or physical findings at this time. I reviewed the vital signs, available nursing notes, past medical history, past surgical history, family history and social history. Vital Signs:  Patient Vitals for the past 12 hrs:   Temp Pulse Resp BP SpO2   10/27/22 0731 -- -- -- (!) 174/93 --   10/27/22 0729 98.3 °F (36.8 °C) 96 18 (!) 142/106 98 %     Vital signs reviewed.     Current Medications:  Current Facility-Administered Medications on File Prior to Encounter   Medication Dose Route Frequency Provider Last Rate Last Admin    [COMPLETED] sodium chloride 0.9 % bolus infusion 1,000 mL  1,000 mL IntraVENous ONCE Sania Gonzalez PA-C   IV Completed at 10/26/22 1213    [COMPLETED] ondansetron (ZOFRAN) injection 4 mg  4 mg IntraVENous ONCE Sania Gonzalez PA-C   4 mg at 10/26/22 1128    [COMPLETED] famotidine (PF) (PEPCID) 20 mg in 0.9% sodium chloride 10 mL injection  20 mg IntraVENous NOW Sania Gonzalez PA-C   20 mg at 10/26/22 1128    [COMPLETED] ketorolac (TORADOL) injection 30 mg  30 mg IntraVENous ONCE Sania Gonzalez PA-C   30 mg at 10/26/22 1128    [COMPLETED] sodium chloride 0.9 % bolus infusion 1,000 mL  1,000 mL IntraVENous ONCE Sania Gonzalez PA-C   IV Completed at 10/26/22 1332    [COMPLETED] iopamidoL (ISOVUE-370) 76 % injection 100 mL  100 mL IntraVENous RAD ONCE Saleem Foster MD   100 mL at 10/26/22 1236     Current Outpatient Medications on File Prior to Encounter   Medication Sig Dispense Refill    ondansetron (ZOFRAN ODT) 4 mg disintegrating tablet Take 1 Tablet by mouth every eight (8) hours as needed for Nausea or Vomiting. 10 Tablet 0    ibuprofen (MOTRIN) 800 mg tablet Take 1 Tablet by mouth every six (6) hours as needed for Pain. 20 Tablet 0    lisinopriL (PRINIVIL, ZESTRIL) 10 mg tablet Take 10 mg by mouth daily. metFORMIN (GLUCOPHAGE) 850 mg tablet Take 850 mg by mouth daily. insulin glargine (Lantus U-100 Insulin) 100 unit/mL injection 30 Units by SubCUTAneous route nightly. albuterol (PROVENTIL HFA, VENTOLIN HFA, PROAIR HFA) 90 mcg/actuation inhaler Take  by inhalation. Past History     Past Medical History:  Past Medical History:   Diagnosis Date    Asthma     Diabetes ([facility] [address]     Hypertension        Past Surgical History:  Past Surgical History:   Procedure Laterality Date    COLONOSCOPY N/A 6/23/2022    COLONOSCOPY performed by Hitesh Ramos MD at [facility] ENDOSCOPY    COLONOSCOPY,[facility] Governor Members  6/23/2022         HX OTHER SURGICAL      hemorrhoids removed       Family History:  No family history on file. Social History:  Social History     Tobacco Use    Smoking status: Every Day     Packs/day: 0.25     Types: Cigarettes    Smokeless tobacco: Never   Substance Use Topics    Alcohol use: No    Drug use: Yes     Types: Marijuana       Allergies:  No Known Allergies      Review of Systems   Review of Systems   Constitutional:  Negative for fever. HENT:  Negative for sore throat and trouble swallowing. Eyes:  Negative for photophobia and redness. Respiratory:  Negative for cough and shortness of breath.     Cardiovascular:  Negative for chest pain and leg swelling. Gastrointestinal:  Positive for abdominal pain, nausea and vomiting. Negative for constipation and diarrhea. Endocrine: Negative for polydipsia and polyuria. Genitourinary:  Negative for dysuria, hematuria and scrotal swelling. Musculoskeletal:  Negative for back pain and joint swelling. Skin:  Negative for rash. Neurological:  Negative for dizziness, syncope, weakness and headaches. Psychiatric/Behavioral:  Negative for suicidal ideas. All other systems reviewed and are negative. Physical Exam   Physical Exam  Vitals and nursing note reviewed. Exam conducted with a chaperone present. Constitutional:       General: He is not in acute distress. Appearance: He is well-developed. HENT:      Head: Normocephalic and atraumatic. Mouth/Throat:      Pharynx: No oropharyngeal exudate. Eyes:      General:         Left eye: No discharge. Extraocular Movements: Extraocular movements intact. Conjunctiva/sclera: Conjunctivae normal.      Pupils: Pupils are equal, round, and reactive to light. Neck:      Vascular: No JVD. Cardiovascular:      Rate and Rhythm: Normal rate and regular rhythm. Heart sounds: Normal heart sounds. Pulmonary:      Effort: Pulmonary effort is normal. No respiratory distress. Breath sounds: Normal breath sounds. No wheezing. Abdominal:      General: Bowel sounds are normal. There is no distension. Palpations: Abdomen is soft. Tenderness: There is abdominal tenderness in the epigastric area. There is no guarding or rebound. Musculoskeletal:         General: No tenderness. Normal range of motion. Cervical back: Normal range of motion and neck supple. Lymphadenopathy:      Cervical: No cervical adenopathy. Skin:     General: Skin is warm and dry. Findings: No rash. Neurological:      Mental Status: He is alert and oriented to person, place, and time. Cranial Nerves: No cranial nerve deficit.       Deep Tendon Reflexes: Reflexes are normal and symmetric. Psychiatric:         Behavior: Behavior normal.       Emergency Department Course   ED Course:  Initial assessment performed. The patient's complaints have been discussed, and they are in agreement with the care plan formulated and outlined with them. I have encouraged them to ask questions as they arise throughout their visit. EKG interpretation: (Preliminary)  Rhythm: normal sinus rhythm; and regular . Rate (approx.): 76; Axis: normal; UT interval: normal; QRS interval: normal ; ST/T wave: non-specific changes; Other findings: borderline ekg. EKG read and interpreted by Eddie Moore MD     Medical Decision Making:  Acute pancreatitis, biliary colic, gallstones, acute cholecystitis, diverticulitis, AAA. Critical Care Time:      Procedure:      Progress note:   Time:    Disposition:  ADMITTED at 8:30 am, patient is being admitted to the hospital. The results of their tests and reasons for their admission have been discussed with them and/or available family. They convey agreement and understanding for the need to be admitted and for their admission diagnosis. Consultation has been made with -hospitalist, for hospitalization. DISCHARGE PLAN:  1. Current Discharge Medication List        2. Follow-up Information    None       3. Return to ED if current symptoms worsen or new symptoms arise. 4. Follow up with None in 3-5 days. Diagnosis     Clinical Impression: No diagnosis found.

## 2022-10-27 NOTE — PROGRESS NOTES
KELLY    RUR-9%      Ongoing assessment, pt unavailable receiving assistance from nursing. Addendum 2:49 pm    Plan  Disposition-home  PCP-On AVS  Transportation-friend Laureen Jasso [phone]      Johnson Islas MD  Next steps: Follow up on 10/31/2022  Emiliano Phillips, [address] [place] [address]   [phone]  PCP- on 10/31/22 at 12 pm        Reason for Admission:  Acute pancreatitis without infection or necrosis                     RUR Score:  9%                   Plan for utilizing home health:  n/A        PCP: First and Last name:  Johnson Islas     Name of Practice: [facility]   Are you a current patient: Yes/No: Yes   Approximate date of last visit: 10/19/22   Can you participate in a virtual visit with your PCP: N/A                    Current Advanced Directive/Advance Care Plan: Full Code      Healthcare Decision Maker:   Click here to complete [address] including selection of the [address] Relationship (ie \"Primary\")             Primary Decision Maker: Eloise Cordova - [facility] - [phone]                     Care Management Interventions  PCP Verified by CM: Yes  Mode of Transport at Discharge: Other (see comment) (friend Laureen Jasso)  Transition of Care Consult (CM Consult): Discharge Planning  Support Systems: Parent(s), Friend/Neighbor  Confirm Follow Up Transport: Self  The Plan for Transition of Care is Related to the Following Treatment Goals : home, pcp  Discharge Location  Patient Expects to be Discharged to[de-identified] Home    Transition of Care Plan:                        CM met w/ pt at bedside, introduced self/role, and conducted initial assessment. Pt's friend Laureen Jasso present, pt gave permission to complete assessment. Pt lives in home w/ parents--utilizes [facility] [address]. Pt stated he is seen by Johnson Islas MD at [facility]. Pt stated he is ambulatory--no DME.  Pt stated he drives himself to appointments and friend Laureen Jasso [phone] would be available to pickup at discharge. Pt stated he is not currently employed or receiving an income. Pt listed mother Deandre Gay as ACP cell [phone].        Jeremy, [address] [place] [address]

## 2022-10-28 VITALS
HEIGHT: 76 IN | DIASTOLIC BLOOD PRESSURE: 86 MMHG | OXYGEN SATURATION: 100 % | HEART RATE: 73 BPM | RESPIRATION RATE: 18 BRPM | SYSTOLIC BLOOD PRESSURE: 153 MMHG | WEIGHT: 227.51 LBS | TEMPERATURE: 97.9 F | BODY MASS INDEX: 27.71 KG/M2

## 2022-10-28 LAB
GLUCOSE BLD STRIP.AUTO-MCNC: 185 MG/DL (ref 65–117)
GLUCOSE BLD STRIP.AUTO-MCNC: 189 MG/DL (ref 65–117)
SERVICE CMNT-IMP: ABNORMAL
SERVICE CMNT-IMP: ABNORMAL

## 2022-10-28 PROCEDURE — 74011250636 HC RX REV CODE- 250/636: Performed by: SURGERY

## 2022-10-28 PROCEDURE — 74011000250 HC RX REV CODE- 250: Performed by: STUDENT IN AN ORGANIZED HEALTH CARE EDUCATION/TRAINING PROGRAM

## 2022-10-28 PROCEDURE — 74011250636 HC RX REV CODE- 250/636: Performed by: STUDENT IN AN ORGANIZED HEALTH CARE EDUCATION/TRAINING PROGRAM

## 2022-10-28 PROCEDURE — 96372 THER/PROPH/DIAG INJ SC/IM: CPT

## 2022-10-28 PROCEDURE — 82962 GLUCOSE BLOOD TEST: CPT

## 2022-10-28 PROCEDURE — 74011636637 HC RX REV CODE- 636/637: Performed by: STUDENT IN AN ORGANIZED HEALTH CARE EDUCATION/TRAINING PROGRAM

## 2022-10-28 PROCEDURE — G0378 HOSPITAL OBSERVATION PER HR: HCPCS

## 2022-10-28 PROCEDURE — 74011250637 HC RX REV CODE- 250/637: Performed by: STUDENT IN AN ORGANIZED HEALTH CARE EDUCATION/TRAINING PROGRAM

## 2022-10-28 RX ORDER — LISINOPRIL 5 MG/1
10 TABLET ORAL DAILY
Status: DISCONTINUED | OUTPATIENT
Start: 2022-10-28 | End: 2022-10-28 | Stop reason: HOSPADM

## 2022-10-28 RX ORDER — AMOXICILLIN 250 MG
1 CAPSULE ORAL DAILY
Qty: 30 TABLET | Refills: 1 | Status: SHIPPED | OUTPATIENT
Start: 2022-10-28 | End: 2022-12-01

## 2022-10-28 RX ORDER — AMOXICILLIN 250 MG
1 CAPSULE ORAL DAILY
Status: DISCONTINUED | OUTPATIENT
Start: 2022-10-28 | End: 2022-10-28 | Stop reason: HOSPADM

## 2022-10-28 RX ORDER — ATORVASTATIN CALCIUM 20 MG/1
20 TABLET, FILM COATED ORAL
Qty: 30 TABLET | Refills: 2 | Status: SHIPPED | OUTPATIENT
Start: 2022-10-28

## 2022-10-28 RX ORDER — OXYCODONE HYDROCHLORIDE 5 MG/1
5 TABLET ORAL
Qty: 12 TABLET | Refills: 0 | Status: SHIPPED | OUTPATIENT
Start: 2022-10-28 | End: 2022-11-02

## 2022-10-28 RX ORDER — DEXTROMETHORPHAN POLISTIREX 30 MG/5 ML
SUSPENSION, EXTENDED RELEASE 12 HR ORAL AS NEEDED
Status: DISCONTINUED | OUTPATIENT
Start: 2022-10-28 | End: 2022-10-28 | Stop reason: HOSPADM

## 2022-10-28 RX ORDER — ATORVASTATIN CALCIUM 10 MG/1
20 TABLET, FILM COATED ORAL
Status: DISCONTINUED | OUTPATIENT
Start: 2022-10-28 | End: 2022-10-28 | Stop reason: HOSPADM

## 2022-10-28 RX ADMIN — LISINOPRIL 10 MG: 5 TABLET ORAL at 09:28

## 2022-10-28 RX ADMIN — SODIUM CHLORIDE, POTASSIUM CHLORIDE, SODIUM LACTATE AND CALCIUM CHLORIDE 100 ML/HR: 600; 310; 30; 20 INJECTION, SOLUTION INTRAVENOUS at 09:59

## 2022-10-28 RX ADMIN — SODIUM CHLORIDE, PRESERVATIVE FREE 10 ML: 5 INJECTION INTRAVENOUS at 05:37

## 2022-10-28 RX ADMIN — Medication 21 UNITS: at 08:41

## 2022-10-28 RX ADMIN — ENOXAPARIN SODIUM 30 MG: 100 INJECTION SUBCUTANEOUS at 08:40

## 2022-10-28 RX ADMIN — SODIUM CHLORIDE, POTASSIUM CHLORIDE, SODIUM LACTATE AND CALCIUM CHLORIDE 100 ML/HR: 600; 310; 30; 20 INJECTION, SOLUTION INTRAVENOUS at 00:34

## 2022-10-28 RX ADMIN — Medication 2 UNITS: at 08:41

## 2022-10-28 NOTE — PROGRESS NOTES
Bedside report given to day shift Nurse Viri Howard. Report included SBAR, MAR, KARDEX, LABS, INTAKE/OUT.

## 2022-10-28 NOTE — PROGRESS NOTES
KELLY     RUR-9%          Plan  Disposition-home  PCP-On AVS  Gen Surg-On AVS  Transportation-friend January Lozano [phone]        Mary Ann Vides MD  Next steps: Follow up on 10/31/2022  [address]   [place], [address]   [phone]  PCP- on 10/31/22 at 12 pm    Follow up with Alejandra Kelley MD on 11/17/2022  Specialty: General Surgery, Surgery General, Oncology  [address] [address]   [phone]  General Surgery-on 11/17/22 at 1:45pm    Discussed in IDRs this AM with MD and team      Pt medically ready for discharge. CM called and scheduled appt for Dr. Mariella Claude 11/17/22-on AVS    Care Management Interventions  PCP Verified by CM: Yes  Mode of Transport at Discharge:  Other (see comment)  Transition of Care Consult (CM Consult): Discharge Planning  Support Systems: Parent(s), Friend/Neighbor  Confirm Follow Up Transport: Self  The Plan for Transition of Care is Related to the Following Treatment Goals : home,pcp,gen surg  Discharge Location  Patient Expects to be Discharged to[de-identified] [facility] [address], [facility]

## 2022-10-28 NOTE — PROGRESS NOTES
Problem: Falls - Risk of  Goal: *Absence of Falls  Description: Document Lettie Duverney Fall Risk and appropriate interventions in the flowsheet.   Outcome: Progressing Towards Goal  Note: Fall Risk Interventions:            Medication Interventions: Teach patient to arise slowly

## 2022-10-28 NOTE — PROGRESS NOTES
1145 Reviewed discharge instructions with pt including follow-up appointments, new medications and side effects, medications to continue, medications to discontinue, Opioids and pancreatitis education, signs/symptoms of stroke and heart attack, My Chart information. Pt expressed understanding. IV was removed. Belongings sent home with pt.

## 2022-10-28 NOTE — DISCHARGE SUMMARY
Hospitalist Discharge Summary     Patient ID:  Mio Cleary  [id number]  43 y.o.  1980    PCP on record: None    Admit date: 10/27/2022  Discharge date and time: 10/28/2022    Please note that this dictation was completed with [employer]iz[employer], the computer voice recognition software. Quite often unanticipated grammatical, syntax, homophones, and other interpretive errors are inadvertently transcribed by the computer software. Please disregard these errors. Please excuse any errors that have escaped final proofreading. Admission Diagnoses: Acute pancreatitis [K85.90]    Discharge Diagnoses: Active Problems:    Acute pancreatitis (10/27/2022)         Hospital Course:   #Acute abdominal pain POA resolving  #Acute pancreatitis POA  #Intractable nausea vomiting due to above resolved  -CT abdomen pelvis with acute uncomplicated pancreatitis. CBD is nondilated. Gallbladder within normal limits. Calcium level 8.9. Triglyceride 172. Ultrasound Right upper quadrant with possible gallbladder polyp versus adherent nonshadowing calculus or sludge. No biliary ductal dilation. Assessed by general surgery appreciate help but eventually went from cholecystectomy as an outpatient  -Patient denies any alcohol use  -Lipase 1034    -Patient tolerated food and medication on the day of discharge without any significant emesis or abdominal pain. patient to be discharged home on as needed pain medication and nausea medication follow-up with PCP/GS further management of pancreatitis  - Patient agreed and verbalized understanding              #Insulin-dependent diabetes mellitus  -A1c 12.3  -Continue home regimen    #Hypertension  -Continue home regimen    #Hyperlipidemia  -Atorvastatin 20 mg initiated patient.   Patient to follow-up with primary care further management of hyperlipidemia        CONSULTATIONS:  IP CONSULT TO GENERAL SURGERY    Excerpted HPI from H&P of Wilman Dahl MD:  Arturo Boas is a 43 y.o.   male with PMH of above-mentioned problems who presents to ED with c/o abdominal pain with nausea vomiting for past few days. Patient was seen yesterday in the ER, diagnosed with acute pancreatitis and was offered admission but refused to get admitted and went home. Patient had worsening abdominal pain with more nausea and diarrhea come back to the ER for further management.       ______________________________________________________________________  DISCHARGE SUMMARY/HOSPITAL COURSE:  for full details see H&P, daily progress notes, labs, consult notes. _______________________________________________________________________  Patient seen and examined by me on discharge day. Pertinent Findings:  Gen:    Not in distress  Chest: Clear lungs  CVS:   Regular rhythm. No edema  Abd:  Soft, not distended, not tender  Neuro:  Alert with good insight. Oriented to person, place, and time   _______________________________________________________________________  DISCHARGE MEDICATIONS:   Current Discharge Medication List        START taking these medications    Details   atorvastatin (LIPITOR) 20 mg tablet Take 1 Tablet by mouth nightly. Qty: 30 Tablet, Refills: 2  Start date: 10/28/2022      oxyCODONE IR (ROXICODONE) 5 mg immediate release tablet Take 1 Tablet by mouth every six (6) hours as needed for Pain for up to 5 days. Max Daily Amount: 20 mg.  Qty: 12 Tablet, Refills: 0  Start date: 10/28/2022, End date: 11/2/2022    Associated Diagnoses: Acute biliary pancreatitis without infection or necrosis      senna-docusate (PERICOLACE) 8.6-50 mg per tablet Take 1 Tablet by mouth daily. Qty: 30 Tablet, Refills: 1  Start date: 10/28/2022           CONTINUE these medications which have NOT CHANGED    Details   ondansetron (ZOFRAN ODT) 4 mg disintegrating tablet Take 1 Tablet by mouth every eight (8) hours as needed for Nausea or Vomiting.   Qty: 10 Tablet, Refills: 0      lisinopriL (PRINIVIL, ZESTRIL) 10 mg tablet Take 10 mg by mouth daily. metFORMIN (GLUCOPHAGE) 850 mg tablet Take 850 mg by mouth daily. insulin glargine (LANTUS) 100 unit/mL injection 36 Units by SubCUTAneous route nightly. albuterol (PROVENTIL HFA, VENTOLIN HFA, PROAIR HFA) 90 mcg/actuation inhaler Take 2 Puffs by inhalation every four (4) hours as needed. STOP taking these medications       ibuprofen (MOTRIN) 800 mg tablet Comments:   Reason for Stopping:               My Recommended Diet, Activity, Wound Care, and follow-up labs are listed in the patient's Discharge Insturctions which I have personally completed and reviewed. _______________________________________________________________________  DISPOSITION:     Home with Family: x   Home with HH/PT/OT/RN:    SNF/LTC:    JAKE:    OTHER:        Condition at Discharge:  Stable  _______________________________________________________________________  Follow up with:   PCP : None  Follow-up Information       Follow up With Specialties Details Why Contact Info    Francisco Carter MD  Follow up on 10/31/2022 PCP- on 10/31/22 at 12 pm,, Arrive 15 mins early, Wear a Mask, Bring discharge paperworks, photo ID, and insurance cards.  If you are unable to keep your appointment please call and reschedule [address], [facility] [address]  [phone]    None    None (034) Patient stated that they have no PCP      Kady Reid MD General Surgery, Surgery General, Oncology Follow up on 11/17/2022 General Surgery-on 11/17/22 at 1:45pm,, Arrive 15 mins early, Bring discharge paperworks and insurance cards, Wear a [employer]  [facility] [employer]  [phone]                  Total time in minutes spent coordinating this discharge (includes going over instructions, follow-up, prescriptions, and preparing report for sign off to her PCP) :  45 minutes    Signed:  Elliott London MD

## 2022-10-31 ENCOUNTER — TELEPHONE (OUTPATIENT)
Dept: CASE MANAGEMENT | Age: 42
End: 2022-10-31

## 2022-10-31 NOTE — TELEPHONE ENCOUNTER
CM called patient [phone]) for purpose of follow up to his hospital discharge. Patient stated \"I'm doing ok\". He saw his PCP earlier today and is aware of the appointment that is scheduled with the surgeon on 11/17/22. Pt received his hospital discharge paperwork and he has picked up his medications. He did not have any questions or concerns.

## 2022-11-17 ENCOUNTER — OFFICE VISIT (OUTPATIENT)
Dept: SURGERY | Age: 42
End: 2022-11-17
Payer: COMMERCIAL

## 2022-11-17 VITALS
BODY MASS INDEX: 28.13 KG/M2 | RESPIRATION RATE: 18 BRPM | WEIGHT: 231 LBS | TEMPERATURE: 98 F | HEIGHT: 76 IN | OXYGEN SATURATION: 99 % | SYSTOLIC BLOOD PRESSURE: 112 MMHG | DIASTOLIC BLOOD PRESSURE: 80 MMHG | HEART RATE: 78 BPM

## 2022-11-17 DIAGNOSIS — K85.10 ACUTE BILIARY PANCREATITIS WITHOUT INFECTION OR NECROSIS: Primary | ICD-10-CM

## 2022-11-17 PROCEDURE — 99212 OFFICE O/P EST SF 10 MIN: CPT | Performed by: SURGERY

## 2022-11-17 NOTE — PROGRESS NOTES
Js Leblanc is a 43 y.o. male who returns for follow up of biliary pancreatitis. Mr. Emil Vega was admitted at [address] [place] [address] on October 27, 2022 with biliary pancreatitis. He improved and was subsequently discharged to home on October 28, 2022. Doing well since then. Still experiencing intermittent abdominal distension and epigastric abdominal pain. No nausea or vomiting. No fevers or chills. Mr. Emil Vega gives no h/o deshaun colored stool or tea colored urine. No chest pain or shortness of breath. He has otherwise been in his usual state of health. Past Medical History:   Diagnosis Date    Asthma     Diabetes ([facility] [address]     Hypertension      Past Surgical History:   Procedure Laterality Date    COLONOSCOPY N/A 6/23/2022    COLONOSCOPY performed by Cassi Hernandez MD at [facility] ENDOSCOPY    COLONOSCOPY,HERACLIO VASQUEZ,SKYLAR  6/23/2022         HX OTHER SURGICAL      hemorrhoids removed     History reviewed. No pertinent family history. Social History     Socioeconomic History    Marital status: SINGLE   Tobacco Use    Smoking status: Every Day     Packs/day: 0.25     Types: Cigars, Cigarettes    Smokeless tobacco: Never   Vaping Use    Vaping Use: Never used   Substance and Sexual Activity    Alcohol use: No    Drug use: Yes     Types: Marijuana    Sexual activity: Yes     Partners: Female     Birth control/protection: None     Review of systems negative except as noted. Review of Systems   Constitutional:  Negative for chills and fever. Respiratory:  Negative for shortness of breath. Cardiovascular:  Negative for chest pain. Gastrointestinal:  Positive for abdominal pain (Epgastric.). Negative for nausea and vomiting. Abdominal bloating. No clear h/o deshaun colored stool. Genitourinary:         No clear h/o tea colored urine. Physical Exam  Vitals reviewed. Constitutional:       General: He is not in acute distress. Appearance: Normal appearance. He is normal weight.    HENT:      Head: Normocephalic and atraumatic. Eyes:      General: No scleral icterus. Cardiovascular:      Rate and Rhythm: Normal rate and regular rhythm. Pulmonary:      Effort: Pulmonary effort is normal.      Breath sounds: Normal breath sounds. Abdominal:      General: There is no distension. Palpations: Abdomen is soft. Tenderness: There is abdominal tenderness in the epigastric area. There is no guarding or rebound. Musculoskeletal:         General: Normal range of motion. Cervical back: Neck supple. Lymphadenopathy:      Cervical: No cervical adenopathy. Neurological:      General: No focal deficit present. Mental Status: He is alert. ASSESSMENT and PLAN  Mr. Kp Tafoya is a 43 y.o. male with a h/o biliary pancreatitis. In view of the findings on History and Physical examination as well as the imaging studies, he should benefit from cholecystectomy. I discussed laparoscopic cholecystectomy and intra-operative cholangiogram with him today including the potential risks of bleeding, infection, injury to the common bile duct and conversion to an open procedure. He understands and wishes to proceed. I have tentatively scheduled Mr. Kp Tafoya for laparoscopic cholecystectomy and intra-operative cholangiogram on December 6, 2022 at [facility]. I will keep him overnight for observation and see him back in the office post-operatively. He is agreeable to this plan of action and is most certainly free to contact the office should any questions or concerns arise.          CC: Ephraim Sanchez MD

## 2022-11-17 NOTE — PROGRESS NOTES
Identified pt with two pt identifiers (name and ). Reviewed chart in preparation for visit and have obtained necessary documentation. Melina Mc is a 43 y.o. male  Chief Complaint   Patient presents with    Hospital Follow Up     Admitted to [facility] 10/27-10/28 for acute pancreatitis. Visit Vitals  /80 (BP 1 Location: Left upper arm, BP Patient Position: Sitting, BP Cuff Size: Large adult)   Pulse 78   Temp 98 °F (36.7 °C) (Oral)   Resp 18   Ht 6' 4\" (1.93 m)   Wt 231 lb (104.8 kg)   SpO2 99%   BMI 28.12 kg/m²       1. Have you been to the ER, urgent care clinic since your last visit? Hospitalized since your last visit? No    2. Have you seen or consulted any other health care providers outside of the [address] [place] [address] since your last visit? Include any pap smears or colon screening.  No

## 2022-11-18 RX ORDER — CYANOCOBALAMIN 1000 UG/ML
1000 INJECTION, SOLUTION INTRAMUSCULAR; SUBCUTANEOUS ONCE
Qty: 1 ML | Refills: 0 | Status: CANCELLED | COMMUNITY
Start: 2022-11-18 | End: 2022-11-18

## 2022-11-18 RX ORDER — ACETAMINOPHEN 325 MG/1
1000 TABLET ORAL ONCE
OUTPATIENT
Start: 2022-11-18 | End: 2022-11-19

## 2022-11-18 RX ORDER — BUPIVACAINE HYDROCHLORIDE 2.5 MG/ML
30 INJECTION, SOLUTION EPIDURAL; INFILTRATION; INTRACAUDAL ONCE
OUTPATIENT
Start: 2022-11-18 | End: 2022-11-18

## 2022-12-01 RX ORDER — INSULIN GLARGINE 100 [IU]/ML
40 INJECTION, SOLUTION SUBCUTANEOUS DAILY
COMMUNITY

## 2022-12-01 NOTE — PERIOP NOTES
PAT PREOP PHONE INTERVIEW COMPLETED WITH:     PATIENT ADVISED NOT TO EAT ANY THING AFTER MIDNIGHT, CLEAR LIQUID DIET AFTER MIDNIGHT   LEAVE ALL VALUABLES AT HOME; DO BRING PICTURE ID, INSURANCE CARD AND ANY COPAY; WEAR COMFORTABLE CLOTHING;  NO PERFUMES, POWDERS, LOTIONS; NO ALCOHOL 24 HOURS BEFORE OR AFTER SURGERY;  WILL NEED TO BE DRIVEN HOME BY FAMILY OR FRIEND;  AVOID TAKING NSAIDS, ASPIRIN, FISH OIL, VITAMIN E OR GLUCOSAMINE/CHONDROITIN DURING THIS TIME PRIOR TO SURGERY;  MAY TAKE TYLENOL. INSTRUCTED TO REPORT  [address] BY SURGEON'S OFFICE. INSTRUCTION PROVIDED FOR CHG SOAP.           INSTRUCTED TO BRING COVID CARD ON DAY OF SURGERY

## 2022-12-06 ENCOUNTER — HOSPITAL ENCOUNTER (OUTPATIENT)
Age: 42
Setting detail: OUTPATIENT SURGERY
Discharge: HOME | End: 2022-12-06
Attending: SURGERY | Admitting: SURGERY
Payer: COMMERCIAL

## 2022-12-06 ENCOUNTER — APPOINTMENT (OUTPATIENT)
Dept: GENERAL RADIOLOGY | Age: 42
End: 2022-12-06
Attending: SURGERY
Payer: COMMERCIAL

## 2022-12-06 ENCOUNTER — ANESTHESIA EVENT (OUTPATIENT)
Dept: SURGERY | Age: 42
End: 2022-12-06
Payer: COMMERCIAL

## 2022-12-06 ENCOUNTER — ANESTHESIA (OUTPATIENT)
Dept: SURGERY | Age: 42
End: 2022-12-06
Payer: COMMERCIAL

## 2022-12-06 VITALS
BODY MASS INDEX: 28 KG/M2 | OXYGEN SATURATION: 97 % | DIASTOLIC BLOOD PRESSURE: 59 MMHG | HEIGHT: 76 IN | RESPIRATION RATE: 13 BRPM | WEIGHT: 229.94 LBS | TEMPERATURE: 97.5 F | SYSTOLIC BLOOD PRESSURE: 108 MMHG | HEART RATE: 69 BPM

## 2022-12-06 DIAGNOSIS — K85.10 ACUTE BILIARY PANCREATITIS WITHOUT INFECTION OR NECROSIS: Primary | ICD-10-CM

## 2022-12-06 LAB
ANION GAP SERPL CALC-SCNC: 6 MMOL/L (ref 5–15)
ATRIAL RATE: 75 BPM
BASOPHILS # BLD: 0.1 K/UL (ref 0–0.1)
BASOPHILS NFR BLD: 1 % (ref 0–1)
BUN SERPL-MCNC: 8 MG/DL (ref 6–20)
BUN/CREAT SERPL: 10 (ref 12–20)
CALCIUM SERPL-MCNC: 8.7 MG/DL (ref 8.5–10.1)
CALCULATED P AXIS, ECG09: 41 DEGREES
CALCULATED R AXIS, ECG10: -11 DEGREES
CALCULATED T AXIS, ECG11: 18 DEGREES
CHLORIDE SERPL-SCNC: 99 MMOL/L (ref 97–108)
CO2 SERPL-SCNC: 27 MMOL/L (ref 21–32)
CREAT SERPL-MCNC: 0.8 MG/DL (ref 0.7–1.3)
DIAGNOSIS, 93000: NORMAL
DIFFERENTIAL METHOD BLD: NORMAL
EOSINOPHIL # BLD: 0.4 K/UL (ref 0–0.4)
EOSINOPHIL NFR BLD: 5 % (ref 0–7)
ERYTHROCYTE [DISTWIDTH] IN BLOOD BY AUTOMATED COUNT: 12.7 % (ref 11.5–14.5)
EST. AVERAGE GLUCOSE BLD GHB EST-MCNC: 301 MG/DL
GLUCOSE BLD STRIP.AUTO-MCNC: 290 MG/DL (ref 65–117)
GLUCOSE BLD STRIP.AUTO-MCNC: 351 MG/DL (ref 65–117)
GLUCOSE BLD STRIP.AUTO-MCNC: 437 MG/DL (ref 65–117)
GLUCOSE SERPL-MCNC: 462 MG/DL (ref 65–100)
HBA1C MFR BLD: 12.1 % (ref 4–5.6)
HCT VFR BLD AUTO: 44.8 % (ref 36.6–50.3)
HGB BLD-MCNC: 15.2 G/DL (ref 12.1–17)
IMM GRANULOCYTES # BLD AUTO: 0 K/UL (ref 0–0.04)
IMM GRANULOCYTES NFR BLD AUTO: 0 % (ref 0–0.5)
LYMPHOCYTES # BLD: 1.7 K/UL (ref 0.8–3.5)
LYMPHOCYTES NFR BLD: 23 % (ref 12–49)
MCH RBC QN AUTO: 30.2 PG (ref 26–34)
MCHC RBC AUTO-ENTMCNC: 33.9 G/DL (ref 30–36.5)
MCV RBC AUTO: 89.1 FL (ref 80–99)
MONOCYTES # BLD: 0.6 K/UL (ref 0–1)
MONOCYTES NFR BLD: 8 % (ref 5–13)
NEUTS SEG # BLD: 4.6 K/UL (ref 1.8–8)
NEUTS SEG NFR BLD: 63 % (ref 32–75)
NRBC # BLD: 0 K/UL (ref 0–0.01)
NRBC BLD-RTO: 0 PER 100 WBC
P-R INTERVAL, ECG05: 142 MS
PLATELET # BLD AUTO: 201 K/UL (ref 150–400)
PMV BLD AUTO: 11.8 FL (ref 8.9–12.9)
POTASSIUM SERPL-SCNC: 4.1 MMOL/L (ref 3.5–5.1)
Q-T INTERVAL, ECG07: 380 MS
QRS DURATION, ECG06: 88 MS
QTC CALCULATION (BEZET), ECG08: 424 MS
RBC # BLD AUTO: 5.03 M/UL (ref 4.1–5.7)
SERVICE CMNT-IMP: ABNORMAL
SODIUM SERPL-SCNC: 132 MMOL/L (ref 136–145)
VENTRICULAR RATE, ECG03: 75 BPM
WBC # BLD AUTO: 7.4 K/UL (ref 4.1–11.1)

## 2022-12-06 PROCEDURE — 74011250636 HC RX REV CODE- 250/636: Performed by: ANESTHESIOLOGY

## 2022-12-06 PROCEDURE — 76210000020 HC REC RM PH II FIRST 0.5 HR: Performed by: SURGERY

## 2022-12-06 PROCEDURE — 77030010507 HC ADH SKN DERMBND J&J -B: Performed by: SURGERY

## 2022-12-06 PROCEDURE — 82962 GLUCOSE BLOOD TEST: CPT

## 2022-12-06 PROCEDURE — 74011250636 HC RX REV CODE- 250/636: Performed by: NURSE ANESTHETIST, CERTIFIED REGISTERED

## 2022-12-06 PROCEDURE — 74011250637 HC RX REV CODE- 250/637: Performed by: SURGERY

## 2022-12-06 PROCEDURE — 93005 ELECTROCARDIOGRAM TRACING: CPT

## 2022-12-06 PROCEDURE — 76060000034 HC ANESTHESIA 1.5 TO 2 HR: Performed by: SURGERY

## 2022-12-06 PROCEDURE — 76010000153 HC OR TIME 1.5 TO 2 HR: Performed by: SURGERY

## 2022-12-06 PROCEDURE — 83036 HEMOGLOBIN GLYCOSYLATED A1C: CPT

## 2022-12-06 PROCEDURE — 77030037032 HC INSRT SCIS CLICKLLINE DISP STOR -B: Performed by: SURGERY

## 2022-12-06 PROCEDURE — 74011000250 HC RX REV CODE- 250: Performed by: SURGERY

## 2022-12-06 PROCEDURE — 88312 SPECIAL STAINS GROUP 1: CPT

## 2022-12-06 PROCEDURE — 77030038093 HC CATH CHOLGM OP PMI PRGV-C: Performed by: SURGERY

## 2022-12-06 PROCEDURE — 77030026438 HC STYL ET INTUB CARD -A: Performed by: ANESTHESIOLOGY

## 2022-12-06 PROCEDURE — 88304 TISSUE EXAM BY PATHOLOGIST: CPT

## 2022-12-06 PROCEDURE — 77030002933 HC SUT MCRYL J&J -A: Performed by: SURGERY

## 2022-12-06 PROCEDURE — 77030040922 HC BLNKT HYPOTHRM STRY -A

## 2022-12-06 PROCEDURE — 77030020053 HC ELECTRD LAPSCP COVD -B: Performed by: SURGERY

## 2022-12-06 PROCEDURE — 77030008756 HC TU IRR SUC STRY -B: Performed by: SURGERY

## 2022-12-06 PROCEDURE — 77030031139 HC SUT VCRL2 J&J -A: Performed by: SURGERY

## 2022-12-06 PROCEDURE — 74011000250 HC RX REV CODE- 250: Performed by: NURSE ANESTHETIST, CERTIFIED REGISTERED

## 2022-12-06 PROCEDURE — 77030020704 HC DISECT ENDOSC BLNT J&J -B: Performed by: SURGERY

## 2022-12-06 PROCEDURE — 77030027743 HC APPL F/HEMSTAT BARD -B: Performed by: SURGERY

## 2022-12-06 PROCEDURE — 74011250636 HC RX REV CODE- 250/636: Performed by: SURGERY

## 2022-12-06 PROCEDURE — 76210000017 HC OR PH I REC 1.5 TO 2 HR: Performed by: SURGERY

## 2022-12-06 PROCEDURE — 77030008684 HC TU ET CUF COVD -B: Performed by: ANESTHESIOLOGY

## 2022-12-06 PROCEDURE — 36415 COLL VENOUS BLD VENIPUNCTURE: CPT

## 2022-12-06 PROCEDURE — 77030018875 HC APPL CLP LIG4 J&J -B: Performed by: SURGERY

## 2022-12-06 PROCEDURE — 85025 COMPLETE CBC W/AUTO DIFF WBC: CPT

## 2022-12-06 PROCEDURE — 77030039895 HC SYST SMK EVAC LAP COVD -B: Performed by: SURGERY

## 2022-12-06 PROCEDURE — 77030012770 HC TRCR OPT FX AMR -B: Performed by: SURGERY

## 2022-12-06 PROCEDURE — 74300 X-RAY BILE DUCTS/PANCREAS: CPT

## 2022-12-06 PROCEDURE — 74011000636 HC RX REV CODE- 636: Performed by: SURGERY

## 2022-12-06 PROCEDURE — 77030008608 HC TRCR ENDOSC SMTH AMR -B: Performed by: SURGERY

## 2022-12-06 PROCEDURE — 77030032060 HC PWDR HEMSTAT ARISTA ASRB 3GM BARD -C: Performed by: SURGERY

## 2022-12-06 PROCEDURE — 74011636637 HC RX REV CODE- 636/637: Performed by: ANESTHESIOLOGY

## 2022-12-06 PROCEDURE — 77030007955 HC PCH ENDOSC SPEC J&J -B: Performed by: SURGERY

## 2022-12-06 PROCEDURE — 77030040361 HC SLV COMPR DVT MDII -B: Performed by: SURGERY

## 2022-12-06 PROCEDURE — 2709999900 HC NON-CHARGEABLE SUPPLY: Performed by: SURGERY

## 2022-12-06 PROCEDURE — 80048 BASIC METABOLIC PNL TOTAL CA: CPT

## 2022-12-06 RX ORDER — SODIUM CHLORIDE 0.9 % (FLUSH) 0.9 %
5-40 SYRINGE (ML) INJECTION AS NEEDED
Status: DISCONTINUED | OUTPATIENT
Start: 2022-12-06 | End: 2022-12-06 | Stop reason: HOSPADM

## 2022-12-06 RX ORDER — NEOSTIGMINE METHYLSULFATE 1 MG/ML
INJECTION, SOLUTION INTRAVENOUS AS NEEDED
Status: DISCONTINUED | OUTPATIENT
Start: 2022-12-06 | End: 2022-12-06 | Stop reason: HOSPADM

## 2022-12-06 RX ORDER — ROCURONIUM BROMIDE 10 MG/ML
INJECTION, SOLUTION INTRAVENOUS AS NEEDED
Status: DISCONTINUED | OUTPATIENT
Start: 2022-12-06 | End: 2022-12-06 | Stop reason: HOSPADM

## 2022-12-06 RX ORDER — SODIUM CHLORIDE, SODIUM LACTATE, POTASSIUM CHLORIDE, CALCIUM CHLORIDE 600; 310; 30; 20 MG/100ML; MG/100ML; MG/100ML; MG/100ML
75 INJECTION, SOLUTION INTRAVENOUS CONTINUOUS
Status: DISCONTINUED | OUTPATIENT
Start: 2022-12-06 | End: 2022-12-06 | Stop reason: HOSPADM

## 2022-12-06 RX ORDER — MORPHINE SULFATE 2 MG/ML
2 INJECTION, SOLUTION INTRAMUSCULAR; INTRAVENOUS
Status: DISCONTINUED | OUTPATIENT
Start: 2022-12-06 | End: 2022-12-06 | Stop reason: HOSPADM

## 2022-12-06 RX ORDER — SUCCINYLCHOLINE CHLORIDE 20 MG/ML
INJECTION INTRAMUSCULAR; INTRAVENOUS AS NEEDED
Status: DISCONTINUED | OUTPATIENT
Start: 2022-12-06 | End: 2022-12-06 | Stop reason: HOSPADM

## 2022-12-06 RX ORDER — ACETAMINOPHEN 500 MG
1000 TABLET ORAL
Qty: 20 TABLET | Refills: 2 | Status: SHIPPED | OUTPATIENT
Start: 2022-12-06

## 2022-12-06 RX ORDER — ONDANSETRON 2 MG/ML
INJECTION INTRAMUSCULAR; INTRAVENOUS AS NEEDED
Status: DISCONTINUED | OUTPATIENT
Start: 2022-12-06 | End: 2022-12-06 | Stop reason: HOSPADM

## 2022-12-06 RX ORDER — FENTANYL CITRATE 50 UG/ML
25 INJECTION, SOLUTION INTRAMUSCULAR; INTRAVENOUS
Status: DISCONTINUED | OUTPATIENT
Start: 2022-12-06 | End: 2022-12-06 | Stop reason: HOSPADM

## 2022-12-06 RX ORDER — MIDAZOLAM HYDROCHLORIDE 1 MG/ML
0.5 INJECTION, SOLUTION INTRAMUSCULAR; INTRAVENOUS
Status: DISCONTINUED | OUTPATIENT
Start: 2022-12-06 | End: 2022-12-06 | Stop reason: HOSPADM

## 2022-12-06 RX ORDER — LIDOCAINE HYDROCHLORIDE 10 MG/ML
0.1 INJECTION, SOLUTION EPIDURAL; INFILTRATION; INTRACAUDAL; PERINEURAL AS NEEDED
Status: DISCONTINUED | OUTPATIENT
Start: 2022-12-06 | End: 2022-12-06 | Stop reason: HOSPADM

## 2022-12-06 RX ORDER — FENTANYL CITRATE 50 UG/ML
INJECTION, SOLUTION INTRAMUSCULAR; INTRAVENOUS AS NEEDED
Status: DISCONTINUED | OUTPATIENT
Start: 2022-12-06 | End: 2022-12-06 | Stop reason: HOSPADM

## 2022-12-06 RX ORDER — OXYCODONE AND ACETAMINOPHEN 5; 325 MG/1; MG/1
1 TABLET ORAL AS NEEDED
Status: DISCONTINUED | OUTPATIENT
Start: 2022-12-06 | End: 2022-12-06 | Stop reason: HOSPADM

## 2022-12-06 RX ORDER — BUPIVACAINE HYDROCHLORIDE 2.5 MG/ML
30 INJECTION, SOLUTION EPIDURAL; INFILTRATION; INTRACAUDAL ONCE
Status: COMPLETED | OUTPATIENT
Start: 2022-12-06 | End: 2022-12-06

## 2022-12-06 RX ORDER — ACETAMINOPHEN 500 MG
1000 TABLET ORAL ONCE
Status: COMPLETED | OUTPATIENT
Start: 2022-12-06 | End: 2022-12-06

## 2022-12-06 RX ORDER — MIDAZOLAM HYDROCHLORIDE 1 MG/ML
1 INJECTION, SOLUTION INTRAMUSCULAR; INTRAVENOUS AS NEEDED
Status: DISCONTINUED | OUTPATIENT
Start: 2022-12-06 | End: 2022-12-06 | Stop reason: HOSPADM

## 2022-12-06 RX ORDER — PHENYLEPHRINE HCL IN 0.9% NACL 0.4MG/10ML
SYRINGE (ML) INTRAVENOUS AS NEEDED
Status: DISCONTINUED | OUTPATIENT
Start: 2022-12-06 | End: 2022-12-06 | Stop reason: HOSPADM

## 2022-12-06 RX ORDER — LIDOCAINE HYDROCHLORIDE 20 MG/ML
INJECTION, SOLUTION EPIDURAL; INFILTRATION; INTRACAUDAL; PERINEURAL AS NEEDED
Status: DISCONTINUED | OUTPATIENT
Start: 2022-12-06 | End: 2022-12-06 | Stop reason: HOSPADM

## 2022-12-06 RX ORDER — PROPOFOL 10 MG/ML
INJECTION, EMULSION INTRAVENOUS AS NEEDED
Status: DISCONTINUED | OUTPATIENT
Start: 2022-12-06 | End: 2022-12-06 | Stop reason: HOSPADM

## 2022-12-06 RX ORDER — DIPHENHYDRAMINE HYDROCHLORIDE 50 MG/ML
12.5 INJECTION, SOLUTION INTRAMUSCULAR; INTRAVENOUS AS NEEDED
Status: DISCONTINUED | OUTPATIENT
Start: 2022-12-06 | End: 2022-12-06 | Stop reason: HOSPADM

## 2022-12-06 RX ORDER — MIDAZOLAM HYDROCHLORIDE 1 MG/ML
INJECTION, SOLUTION INTRAMUSCULAR; INTRAVENOUS AS NEEDED
Status: DISCONTINUED | OUTPATIENT
Start: 2022-12-06 | End: 2022-12-06 | Stop reason: HOSPADM

## 2022-12-06 RX ORDER — OXYCODONE HYDROCHLORIDE 5 MG/1
5 TABLET ORAL
Qty: 5 TABLET | Refills: 0 | Status: SHIPPED | OUTPATIENT
Start: 2022-12-06 | End: 2022-12-09

## 2022-12-06 RX ORDER — HYDROMORPHONE HYDROCHLORIDE 2 MG/ML
INJECTION, SOLUTION INTRAMUSCULAR; INTRAVENOUS; SUBCUTANEOUS AS NEEDED
Status: DISCONTINUED | OUTPATIENT
Start: 2022-12-06 | End: 2022-12-06 | Stop reason: HOSPADM

## 2022-12-06 RX ORDER — ONDANSETRON 2 MG/ML
4 INJECTION INTRAMUSCULAR; INTRAVENOUS AS NEEDED
Status: DISCONTINUED | OUTPATIENT
Start: 2022-12-06 | End: 2022-12-06 | Stop reason: HOSPADM

## 2022-12-06 RX ORDER — HYDROMORPHONE HYDROCHLORIDE 1 MG/ML
0.2 INJECTION, SOLUTION INTRAMUSCULAR; INTRAVENOUS; SUBCUTANEOUS
Status: DISCONTINUED | OUTPATIENT
Start: 2022-12-06 | End: 2022-12-06 | Stop reason: HOSPADM

## 2022-12-06 RX ORDER — KETOROLAC TROMETHAMINE 30 MG/ML
INJECTION, SOLUTION INTRAMUSCULAR; INTRAVENOUS AS NEEDED
Status: DISCONTINUED | OUTPATIENT
Start: 2022-12-06 | End: 2022-12-06 | Stop reason: HOSPADM

## 2022-12-06 RX ORDER — SODIUM CHLORIDE 0.9 % (FLUSH) 0.9 %
5-40 SYRINGE (ML) INJECTION EVERY 8 HOURS
Status: DISCONTINUED | OUTPATIENT
Start: 2022-12-06 | End: 2022-12-06 | Stop reason: HOSPADM

## 2022-12-06 RX ORDER — SODIUM CHLORIDE 9 MG/ML
50 INJECTION, SOLUTION INTRAVENOUS CONTINUOUS
Status: DISCONTINUED | OUTPATIENT
Start: 2022-12-06 | End: 2022-12-06 | Stop reason: HOSPADM

## 2022-12-06 RX ORDER — FENTANYL CITRATE 50 UG/ML
50 INJECTION, SOLUTION INTRAMUSCULAR; INTRAVENOUS AS NEEDED
Status: DISCONTINUED | OUTPATIENT
Start: 2022-12-06 | End: 2022-12-06 | Stop reason: HOSPADM

## 2022-12-06 RX ORDER — GLYCOPYRROLATE 0.2 MG/ML
INJECTION INTRAMUSCULAR; INTRAVENOUS AS NEEDED
Status: DISCONTINUED | OUTPATIENT
Start: 2022-12-06 | End: 2022-12-06 | Stop reason: HOSPADM

## 2022-12-06 RX ADMIN — NEOSTIGMINE METHYLSULFATE 3 MG: 1 INJECTION, SOLUTION INTRAVENOUS at 11:15

## 2022-12-06 RX ADMIN — FENTANYL CITRATE 100 MCG: 50 INJECTION INTRAMUSCULAR; INTRAVENOUS at 10:01

## 2022-12-06 RX ADMIN — ONDANSETRON HYDROCHLORIDE 4 MG: 2 INJECTION, SOLUTION INTRAMUSCULAR; INTRAVENOUS at 11:15

## 2022-12-06 RX ADMIN — KETOROLAC TROMETHAMINE 30 MG: 30 INJECTION, SOLUTION INTRAMUSCULAR; INTRAVENOUS at 11:15

## 2022-12-06 RX ADMIN — WATER 2 G: 1 INJECTION INTRAMUSCULAR; INTRAVENOUS; SUBCUTANEOUS at 10:10

## 2022-12-06 RX ADMIN — LIDOCAINE HYDROCHLORIDE 80 MG: 20 INJECTION, SOLUTION EPIDURAL; INFILTRATION; INTRACAUDAL; PERINEURAL at 10:01

## 2022-12-06 RX ADMIN — Medication 12 UNITS: at 09:29

## 2022-12-06 RX ADMIN — HYDROMORPHONE HYDROCHLORIDE 1 MG: 2 INJECTION, SOLUTION INTRAMUSCULAR; INTRAVENOUS; SUBCUTANEOUS at 11:15

## 2022-12-06 RX ADMIN — Medication 100 MCG: at 10:37

## 2022-12-06 RX ADMIN — SODIUM CHLORIDE, POTASSIUM CHLORIDE, SODIUM LACTATE AND CALCIUM CHLORIDE 75 ML/HR: 600; 310; 30; 20 INJECTION, SOLUTION INTRAVENOUS at 09:00

## 2022-12-06 RX ADMIN — ACETAMINOPHEN 1000 MG: 500 TABLET ORAL at 08:41

## 2022-12-06 RX ADMIN — ROCURONIUM BROMIDE 5 MG: 10 SOLUTION INTRAVENOUS at 10:01

## 2022-12-06 RX ADMIN — PROPOFOL 200 MG: 10 INJECTION, EMULSION INTRAVENOUS at 10:01

## 2022-12-06 RX ADMIN — HYDROMORPHONE HYDROCHLORIDE 1 MG: 2 INJECTION, SOLUTION INTRAMUSCULAR; INTRAVENOUS; SUBCUTANEOUS at 10:55

## 2022-12-06 RX ADMIN — ROCURONIUM BROMIDE 35 MG: 10 SOLUTION INTRAVENOUS at 10:10

## 2022-12-06 RX ADMIN — GLYCOPYRROLATE 0.6 MG: 0.2 INJECTION INTRAMUSCULAR; INTRAVENOUS at 11:15

## 2022-12-06 RX ADMIN — SUCCINYLCHOLINE CHLORIDE 140 MG: 20 INJECTION, SOLUTION INTRAMUSCULAR; INTRAVENOUS at 10:01

## 2022-12-06 RX ADMIN — MIDAZOLAM 2 MG: 1 INJECTION INTRAMUSCULAR; INTRAVENOUS at 09:55

## 2022-12-06 NOTE — ANESTHESIA PREPROCEDURE EVALUATION
Relevant Problems   No relevant active problems       Anesthetic History   No history of anesthetic complications            Review of Systems / Medical History  Patient summary reviewed, nursing notes reviewed and pertinent labs reviewed    Pulmonary  Within defined limits          Asthma        Neuro/Psych   Within defined limits           Cardiovascular  Within defined limits  Hypertension              Exercise tolerance: >4 METS     GI/Hepatic/Renal  Within defined limits              Endo/Other  Within defined limits  Diabetes         Other Findings              Physical Exam    Airway  Mallampati: II  TM Distance: 4 - 6 cm  Neck ROM: normal range of motion   Mouth opening: Normal     Cardiovascular  Regular rate and rhythm,  S1 and S2 normal,  no murmur, click, rub, or gallop             Dental  No notable dental hx       Pulmonary  Breath sounds clear to auscultation               Abdominal  GI exam deferred       Other Findings            Anesthetic Plan    ASA: 2  Anesthesia type: general          Induction: Intravenous  Anesthetic plan and risks discussed with: Patient      Propofol MAC

## 2022-12-06 NOTE — ANESTHESIA POSTPROCEDURE EVALUATION
Procedure(s):  LAPAROSCOPIC CHOLECYSTECTOMY WITH GRAMS. general    Anesthesia Post Evaluation      Multimodal analgesia: multimodal analgesia used between 6 hours prior to anesthesia start to PACU discharge  Patient location during evaluation: PACU  Patient participation: complete - patient participated  Level of consciousness: awake and alert  Pain management: adequate  Airway patency: patent  Anesthetic complications: no  Cardiovascular status: acceptable  Respiratory status: acceptable  Hydration status: acceptable  Post anesthesia nausea and vomiting:  none  Final Post Anesthesia Temperature Assessment:  Normothermia (36.0-37.5 degrees C)      INITIAL Post-op Vital signs:   Vitals Value Taken Time   /59 12/06/22 1315   Temp 36.4 °C (97.5 °F) 12/06/22 1145   Pulse 80 12/06/22 1329   Resp 12 12/06/22 1329   SpO2 98 % 12/06/22 1329   Vitals shown include unvalidated device data.

## 2022-12-06 NOTE — DISCHARGE INSTRUCTIONS
Patient Discharge Instructions    Sandra Medina / [id number] : 1980    Admitted 2022 Discharged: 2022       It is important that you take the medication exactly as they are prescribed. Keep your medication in the bottles provided by the pharmacist and keep a list of the medication names, dosages, and times to be taken in your wallet. Do not take other medications without consulting your doctor. What to do at Home    Recommended diet: As Directed. Recommended activity: No Restrictions. No Driving While Taking Oxycodone. Tylenol 1000 mg every 6 hours for 2 days and then 1000 mg every 6 hours as needed for pain. Ice pack to abdomen as needed. Oxycodone as needed for severe pain. May Take Shower or Poulan Roxo. If you experience any of the following symptoms Fevers, Chills, Nausea, Vomitting, Redness or Drainage at Surgical Site(s) or Any Other Questions or Concerns Please Call -  [phone]. Follow-up with Dr. Neal Zelaya in 10-14 days at [address] [place] [address]. Follow-up with Dr. Preston Osborne in 2 weeks. Information obtained by :  I understand that if any problems occur once I am at home I am to contact my physician. I understand and acknowledge receipt of the instructions indicated above.                                                                                                                                            Physician's or R.N.'s Signature                                                                  Date/Time                                                                                                                                              Patient or Representative Signature                                                          Date/Time  ______________________________________________________________________    Anesthesia Discharge Instructions    After general anesthesia or intervenous sedation, for 24 hours or while taking prescription Narcotics:  Limit your activities  Do not drive or operate hazardous machinery  If you have not urinated within 8 hours after discharge, please contact your surgeon on call. Do not make important personal or business decisions  Do not drink alcoholic beverages    Report the following to your surgeon:  Excessive pain, swelling, redness or odor of or around the surgical area  Temperature over 100.5 degrees  Nausea and vomiting lasting longer than 4 hours or if unable to take medication  Any signs of decreased circulation or nerve impairment to extremity:  Change in color, persistent numbness, tingling, coldness or increased pain.   Any questions

## 2022-12-06 NOTE — H&P
Date of Surgery Update:  Gilbert Kaplan was seen and examined. History and physical has been reviewed. The patient has been examined. There have been no significant clinical changes since the completion of the originally dated History and Physical.    Signed By: Tiera Morelos MD     December 6, 2022 9:15 AM         Please note from the office and include the additional information below:    Past Medical History  Past Medical History:   Diagnosis Date    Asthma     Diabetes ([facility] [address]     Vale Jovanny stone 2022    Hemorrhoid     Hypertension     Melanoma ([facility] [address] 2010    STOMACH        Past Surgical History  Past Surgical History:   Procedure Laterality Date    COLONOSCOPY N/A 06/23/2022    COLONOSCOPY performed by Alin Calderon MD at [address] [place] [address],[address]  06/23/2022         HX GI      COLONOSCOPY    HX GI      HEMORRHOIDECTOMY    HX OTHER SURGICAL      hemorrhoids removed    HX OTHER SURGICAL      MELANOMA REMOVED FROM STOMACH    HX POLYPECTOMY          Social History  The patient Gilbert Kaplan  reports that he has been smoking cigars and cigarettes. He has a 7.50 pack-year smoking history. He has never used smokeless tobacco. He reports current drug use. Drug: Marijuana. He reports that he does not drink alcohol.      Family History  Family History   Problem Relation Age of Onset    Hypertension Mother     Diabetes Mother     Hypertension Father     Diabetes Father     Anesth Problems Neg Hx

## 2022-12-06 NOTE — BRIEF OP NOTE
Brief Postoperative Note    Patient: Jerrell Reyes  YOB: 1980  MRN: [id number]    Date of Procedure: 12/6/2022     Pre-Op Diagnosis:  Symptomatic Cholelithiasis. Post-Op Diagnosis:  Same. Procedure(s):   Laparoscopic Cholecystectomy. Intra-Operative Cholangiogram.    Surgeon(s):  Bala Newell MD    Surgical Assistant: Ocie Dance, SA. [place], [place]. Anesthesia: General     Estimated Blood Loss (mL): Approximately 10 ml. Complications: None    Specimens:   ID Type Source Tests Collected by Time Destination   1 : Gallbladder Fresh Gallbladder  Bala Newell MD 12/6/2022 1102 Pathology        Implants: * No implants in log *    Drains: * No LDAs found *    Findings: Chronic cholecystitis.  No apparent retained CBD stone on cholangiogram.    Electronically Signed by Lenora Jacobs MD on 12/6/2022 at 11:24 AM

## 2022-12-06 NOTE — PROGRESS NOTES
12/06/22 1032   Family Communication   Family Update Message Procedure started   Delivery Origin Nurse    Relationship to Patient Friend  (Melchor Lorenz)    Phone Number [phone]   Family/Significant Other Update Called

## 2022-12-06 NOTE — PERIOP NOTES
Ifeoma 3g given to sterile field:  Reference number- [id number]  Lot number- [id number]  Exp date- 07/28/2027

## 2022-12-07 NOTE — OP NOTES
[facility][note 3]  OPERATIVE REPORT    Name:  Brooke Cespedes  MR#:  [id number]  :  1980  ACCOUNT #:  [de-identified]  DATE OF SERVICE:  2022    PREOPERATIVE DIAGNOSIS:  Symptomatic cholelithiasis. POSTOPERATIVE DIAGNOSIS:  Symptomatic cholelithiasis. PROCEDURES PERFORMED:  1. Laparoscopic cholecystectomy. 2.  Intraoperative cholangiogram.    SURGEON:  Effie French MD    ASSISTANTS:  Rachel Elias SA and Harinder Cheung    ANESTHESIA:  General endotracheal.    COMPLICATIONS:  None. SPECIMENS REMOVED:  Gallbladder to Pathology. IMPLANTS:  None. ESTIMATED BLOOD LOSS:  Approximately 10 mL. IV FLUIDS:  Crystalloid 1000 mL. DRAINS:  None. INDICATIONS:  The patient is a 19-year-old man who was recently admitted with an episode of biliary pancreatitis. His pancreatitis has resolved and Mr. Tyler Acevedo is brought to the operating room at this time for laparoscopic cholecystectomy and intraoperative cholangiogram.  The risks of the procedure, including but not limited to, infection, bleeding, injury to the common bile duct, and conversion to an open procedure were discussed in detail with the patient. Mr. Tyler Acevedo understood and wished to proceed. PROCEDURE:  After consent was obtained, the patient was brought to the operating room where he was placed in the supine position on the operating room table. Following the induction of an adequate level of general anesthesia via the endotracheal tube, compression devices were placed on both lower extremities. The abdomen was prepped with ChloraPrep and draped as a sterile field. Local anesthetic was infiltrated and a small transverse incision below the umbilicus was opened sharply. Using the 5 mm non-bladed dilating trocar, access to the peritoneal cavity was achieved under direct vision. After an adequate level of carbon dioxide pneumoperitoneum had been achieved, the laparoscope was inserted.   Inspection of the peritoneal contents revealed no focal abnormalities. Local anesthetic was infiltrated again and a 12 mm trocar and two 5 mm trocars were placed in the usual locations under direct vision. The operating room table was placed in the reverse Trendelenburg position and attention was directed towards the gallbladder. The gallbladder was readily identified and appeared thick-walled consistent with chronic cholecystitis. The gallbladder was grasped and attention directed towards the cystic duct. This structure was identified, dissected free circumferentially and followed into the gallbladder. The cystic artery was subsequently identified, dissected free circumferentially and followed into the gallbladder as well. The gallbladder was mobilized and the critical view was obtained. In view of the patient's history of biliary pancreatitis, it was decided to perform an intraoperative cholangiogram.  The Barrett clamp was brought on the field and placed across the inferior aspect of the gallbladder. The catheter was inserted and the cholangiogram was performed. Contrast was noted in the cystic duct, the common bile duct, and the duodenum. There were no filling defects consistent with a retained common bile duct stone identified on the study. Furthermore, contrast was noted in the common hepatic duct, the right and left hepatic ducts, and the intrahepatic biliary radicles. The catheter was removed and the Barrett clamp was removed as well. The cystic duct was divided between three clips proximally and one distally. The cystic artery was subsequently divided between three clips proximally and one distally as well. A small posterior branch of the cystic artery was noted and this was divided between two clips proximally and one distally. Using the hook electrocautery, the gallbladder was taken off the liver and placed in an Endo Catch bag.   The specimen was removed, passed off the field and submitted for histopathologic evaluation. The gallbladder fossa was inspected and the clips on the cystic duct stump and the clips on the cystic artery stumps were identified and found to be in place. Several bleeders in the gallbladder fossa were carefully cauterized. The gallbladder fossa was irrigated with saline and 3 g of Ifeoma were placed. The peritoneal cavity was inspected again and felt to be hemostatic. No other abnormalities were noted and so the 12 mm trocar was removed under direct vision. The fascial defect at the 12 mm trocar site was closed with a 0 Vicryl suture using the Endo Close device. The remaining trocars were removed under direct vision and the pneumoperitoneum was evacuated. The wounds were irrigated with saline and the incision at the 12 mm trocar site was closed with two 0 Vicryl sutures followed by 4-0 Monocryl subcuticular suture to the skin. The three 5 mm trocar sites were closed with 4-0 Monocryl subcuticular suture to the skin. Additional local anesthetic was infiltrated and the four incisions were dressed with Dermabond. The patient was awakened from his general anesthetic and extubated in the operating room. [facility] and brought to the recovery room in stable condition having tolerated the procedure well. At the conclusion of the procedure, all sponge counts, instrument counts, and needle counts were reported as correct x2.       Shameka Little MD [id number]  D:  12/06/2022 11:32  T:  12/06/2022 20:11  JOB #:  [id number]  CC:  MD Page Briggs MD

## 2022-12-19 ENCOUNTER — OFFICE VISIT (OUTPATIENT)
Dept: SURGERY | Age: 42
End: 2022-12-19
Payer: COMMERCIAL

## 2022-12-19 VITALS
DIASTOLIC BLOOD PRESSURE: 85 MMHG | HEIGHT: 76 IN | OXYGEN SATURATION: 98 % | SYSTOLIC BLOOD PRESSURE: 125 MMHG | WEIGHT: 229.6 LBS | HEART RATE: 97 BPM | RESPIRATION RATE: 18 BRPM | TEMPERATURE: 98.5 F | BODY MASS INDEX: 27.96 KG/M2

## 2022-12-19 DIAGNOSIS — Z90.49 S/P LAPAROSCOPIC CHOLECYSTECTOMY: ICD-10-CM

## 2022-12-19 DIAGNOSIS — Z09 POSTOPERATIVE EXAMINATION: Primary | ICD-10-CM

## 2022-12-19 PROCEDURE — 99024 POSTOP FOLLOW-UP VISIT: CPT

## 2022-12-19 NOTE — PROGRESS NOTES
Identified pt with two pt identifiers (name and ). Reviewed chart in preparation for visit and have obtained necessary documentation. Paul Kirk is a 43 y.o. male  Chief Complaint   Patient presents with    Post OP Follow Up     22  Laparoscopic cholecystectomy Dr Haylee Rich      Visit Vitals  /85 (BP 1 Location: Left arm, BP Patient Position: Sitting, BP Cuff Size: Adult)   Pulse 97   Temp 98.5 °F (36.9 °C) (Oral)   Resp 18   Ht 6' 4\" (1.93 m)   Wt 229 lb 9.6 oz (104.1 kg)   SpO2 98%   BMI 27.95 kg/m²       1. Have you been to the ER, urgent care clinic since your last visit? Hospitalized since your last visit? No    2. Have you seen or consulted any other health care providers outside of the [address] [place] [address] since your last visit? Include any pap smears or colon screening.  No

## 2022-12-19 NOTE — PATIENT INSTRUCTIONS
-  No lifting greater than 20 lbs x1 week then 30 lbs x1 week then may advance activity as tolerated. -  Caution with fatty / fried foods as can cause some stomach upset and diarrhea     -   If you notice symptoms of upset stomach diarrhea ---->Recommend food journal as you introduce foods back into your diet. -  May use heating pad and/or supportive wear for any abdominal pain/soreness     -  Ok to bath as normal and you may get in a swimming pool     -  Use sun block on exposed skin and ok to moisturize the incisions as desired     -  keep all follow up appts with GI to follow up with blood in stool from hemorrhoids and polyps.      -  Call with any questions or concerns    -  Follow up only as needed

## 2022-12-19 NOTE — PROGRESS NOTES
CC: Post Operative state    Subjective:     Cornelius Jimenez is a 43 y.o. male presents for postop care 2 weeks s/p Laparoscopic cholecystectomy & Intraoperative cholangiogram    Patient doing well postoperatively. Reports he is having some mild pain larger port site, but is well-tolerated without any pain medication. Tolerating a regular diet; No nausea or vomiting. Bowel movements are regular. Has noticed he still had some blood in his stool, but is being followed by GI and was told that he had polyps and hemorrhoids after recent colonoscopy. The patient is voiding without difficulty and reports normal clear yellow urine. Patient denies fever, chest pain, or shortness of breath. Review of Systems:  A comprehensive review of systems was negative except for that written above      Mr. Shira Waters has a reminder for a \"due or due soon\" health maintenance. I have asked that he contact his primary care provider for follow-up on this health maintenance. Objective:     Visit Vitals  /85 (BP 1 Location: Left arm, BP Patient Position: Sitting, BP Cuff Size: Adult)   Pulse 97   Temp 98.5 °F (36.9 °C) (Oral)   Resp 18   Ht 6' 4\" (1.93 m)   Wt 229 lb 9.6 oz (104.1 kg)   SpO2 98%   BMI 27.95 kg/m²       General: alert, cooperative, no distress, appears stated age  CV: Regular rate and rhythm  Pulmonary: Lungs clear to auscultation   Abdomen:soft, bowel sounds active, non-tender  Lap sites:  healing well, no drainage, no erythema, no dehiscence, incision well approximated. No signs of infection. Assessment:       ICD-10-CM ICD-9-CM    1. Postoperative examination  Z09 V67.00       2. S/P laparoscopic cholecystectomy  Z90.49 V45.89           Plan:   2+ weeks s/p Laparoscopic cholecystectomy & Intraoperative cholangiogram  Reviewed pathology with patient   Gallbladder, cholecystectomy:        Chronic cholecystitis. Cholesterolosis. No evidence for neoplasm.         Lymph node: Nonnecrotizing granulomas. Reassured patient that surgical sites were healing well. Wound care discussed. May submerge in water and continue to wash with mild soap and water. May moisturize surgical sites as desired. May use heating pad for any abdominal pain/soreness. We discussed the importance of proper diet post op- Caution with fatty / fried foods as can cause some stomach upset and diarrhea. Instructed to keep all follow up appts with GI to follow up with blood in stool from hemorrhoids and polyps. No lifting greater than 20 lbs x1 week then 30 lbs x1 week then may advance activity as tolerated. Roby Connolly verbalized understanding and questions were answered to the best of my knowledge and ability. Instructed patient to call with any questions or concerns.   Discharged from surgical care with prn follow up         > 15 minutes were spent with patient with greater than 50% of that time spent face to face counseling    Britta Chávez NP  [facility]   12/19/2022

## 2023-07-10 ENCOUNTER — HOSPITAL ENCOUNTER (EMERGENCY)
Facility: HOSPITAL | Age: 43
Discharge: HOME | End: 2023-07-10
Payer: COMMERCIAL

## 2023-07-10 VITALS
BODY MASS INDEX: 27.89 KG/M2 | SYSTOLIC BLOOD PRESSURE: 134 MMHG | TEMPERATURE: 97.8 F | HEIGHT: 76 IN | OXYGEN SATURATION: 99 % | RESPIRATION RATE: 18 BRPM | WEIGHT: 229 LBS | DIASTOLIC BLOOD PRESSURE: 87 MMHG | HEART RATE: 96 BPM

## 2023-07-10 DIAGNOSIS — L03.90 CELLULITIS, UNSPECIFIED CELLULITIS SITE: Primary | ICD-10-CM

## 2023-07-10 PROCEDURE — 99283 EMERGENCY DEPT VISIT LOW MDM: CPT

## 2023-07-10 PROCEDURE — 6370000000 HC RX 637 (ALT 250 FOR IP): Performed by: PHYSICIAN ASSISTANT

## 2023-07-10 RX ORDER — GLIPIZIDE 5 MG/1
TABLET ORAL
COMMUNITY

## 2023-07-10 RX ORDER — CEPHALEXIN 250 MG/1
500 CAPSULE ORAL ONCE
Status: COMPLETED | OUTPATIENT
Start: 2023-07-10 | End: 2023-07-10

## 2023-07-10 RX ORDER — CEPHALEXIN 500 MG/1
500 CAPSULE ORAL 4 TIMES DAILY
Qty: 28 CAPSULE | Refills: 0 | Status: SHIPPED | OUTPATIENT
Start: 2023-07-10 | End: 2023-07-17

## 2023-07-10 RX ORDER — SULFAMETHOXAZOLE AND TRIMETHOPRIM 800; 160 MG/1; MG/1
1 TABLET ORAL 2 TIMES DAILY
Qty: 14 TABLET | Refills: 0 | Status: SHIPPED | OUTPATIENT
Start: 2023-07-10 | End: 2023-07-17

## 2023-07-10 RX ORDER — ISOSORBIDE DINITRATE 10 MG/1
10 TABLET ORAL EVERY 8 HOURS
COMMUNITY
Start: 2023-06-21

## 2023-07-10 RX ORDER — ATORVASTATIN CALCIUM 80 MG/1
80 TABLET, FILM COATED ORAL DAILY
COMMUNITY
Start: 2023-06-21

## 2023-07-10 RX ORDER — LORATADINE 10 MG
TABLET ORAL
COMMUNITY
Start: 2023-06-21

## 2023-07-10 RX ORDER — SULFAMETHOXAZOLE AND TRIMETHOPRIM 800; 160 MG/1; MG/1
1 TABLET ORAL
Status: COMPLETED | OUTPATIENT
Start: 2023-07-10 | End: 2023-07-10

## 2023-07-10 RX ADMIN — CEPHALEXIN 500 MG: 250 CAPSULE ORAL at 20:16

## 2023-07-10 RX ADMIN — SULFAMETHOXAZOLE AND TRIMETHOPRIM 1 TABLET: 800; 160 TABLET ORAL at 20:16

## 2023-07-10 ASSESSMENT — PAIN - FUNCTIONAL ASSESSMENT
PAIN_FUNCTIONAL_ASSESSMENT: 0-10
PAIN_FUNCTIONAL_ASSESSMENT: 0-10

## 2023-07-10 ASSESSMENT — PAIN DESCRIPTION - DESCRIPTORS: DESCRIPTORS: ACHING

## 2023-07-10 ASSESSMENT — PAIN DESCRIPTION - LOCATION
LOCATION: FACE
LOCATION: FACE

## 2023-07-10 ASSESSMENT — PAIN DESCRIPTION - ORIENTATION
ORIENTATION: LEFT;LOWER
ORIENTATION: LEFT

## 2023-07-10 ASSESSMENT — PAIN SCALES - GENERAL
PAINLEVEL_OUTOF10: 7
PAINLEVEL_OUTOF10: 5

## 2023-07-11 NOTE — DISCHARGE INSTRUCTIONS
Thank You! It was a pleasure taking care of you in our Emergency Department today. We know that when you come to [email or web address], you are entrusting us with your health, comfort, and safety. Our clinicians honor that trust, and truly appreciate the opportunity to care for you and your loved ones. We also value your feedback. If you receive a survey about your Emergency Department experience today, please fill it out. We care about our patients' feedback, and we listen to what you have to say. Thank you.     Fabienne Zavala PA-C

## 2023-07-11 NOTE — ED NOTES
Patient (s)  given copy of dc instructions and 2 script(s). Patient (s)  verbalized understanding of instructions and script (s). Patient given a current medication reconciliation form and verbalized understanding of their medications. Patient (s) verbalized understanding of the importance of discussing medications with his or her physician or clinic they will be following up with. Patient alert and oriented and in no acute distress. Patient discharged home, patient offered wheelchair, patient declined wheelchair.         Laverne Perrin RN  07/10/23 2018

## 2023-07-11 NOTE — ED PROVIDER NOTES
[facility] EMERGENCY DEPT  EMERGENCY DEPARTMENT ENCOUNTER       Pt Name: Nelia Navarro  MRN: [id number]  [address] [place] 1980  Date of evaluation: 7/10/2023  Provider: AMOR Turner   PCP: Kati Mazariegos MD  Note Started: 8:15 PM EDT 7/10/23     CHIEF COMPLAINT       Chief Complaint   Patient presents with    Cyst        HISTORY OF PRESENT ILLNESS: 1 or more elements      History From: Patient  None     Nelia Navarro is a 37 y.o. male who presents to the ED  for evaluation of an area of swelling and tenderness to the left side of his face for the past 2 to 3 days. States it got bigger from yesterday to today. Endorses tenderness. Denies any drainage or bleeding. Denies fevers. Denies any sore throat, dental pain, neck pain or stiffness, difficulty swallowing, jaw pain, difficulty opening mouth. States he is otherwise in his usual state of health. Nursing Notes were all reviewed and agreed with or any disagreements were addressed in the HPI. REVIEW OF SYSTEMS      Review of Systems   All other systems reviewed and are negative. Positives and Pertinent negatives as per HPI.     PAST HISTORY     Past Medical History:  Past Medical History:   Diagnosis Date    Asthma     Diabetes ([address])     Gall stone 2022    Hemorrhoid     Hypertension     Melanoma ([address]) 2010    STOMACH       Past Surgical History:  Past Surgical History:   Procedure Laterality Date    CHOLECYSTECTOMY  12/06/2022    Dr Putnam Ing    COLONOSCOPY N/A 06/23/2022    COLONOSCOPY performed by Roderick Velazquez MD at [address]  06/23/2022         CORONARY ANGIOPLASTY      GI      COLONOSCOPY    GI      HEMORRHOIDECTOMY    OTHER SURGICAL HISTORY      hemorrhoids removed    OTHER SURGICAL HISTORY      MELANOMA REMOVED FROM STOMACH    POLYPECTOMY         Family History:  Family History   Problem Relation Age of Onset    Diabetes Mother     Anesth Problems Neg Hx     Diabetes Father     Hypertension Father     Hypertension

## 2023-07-14 ENCOUNTER — APPOINTMENT (OUTPATIENT)
Facility: HOSPITAL | Age: 43
End: 2023-07-14
Payer: COMMERCIAL

## 2023-07-14 ENCOUNTER — HOSPITAL ENCOUNTER (EMERGENCY)
Facility: HOSPITAL | Age: 43
Discharge: HOME | End: 2023-07-14
Attending: STUDENT IN AN ORGANIZED HEALTH CARE EDUCATION/TRAINING PROGRAM
Payer: COMMERCIAL

## 2023-07-14 VITALS
SYSTOLIC BLOOD PRESSURE: 136 MMHG | RESPIRATION RATE: 18 BRPM | HEIGHT: 76 IN | OXYGEN SATURATION: 100 % | BODY MASS INDEX: 26.55 KG/M2 | DIASTOLIC BLOOD PRESSURE: 96 MMHG | WEIGHT: 218 LBS | TEMPERATURE: 99.9 F | HEART RATE: 100 BPM

## 2023-07-14 DIAGNOSIS — L02.01 ABSCESS OF FACE: Primary | ICD-10-CM

## 2023-07-14 LAB
ALBUMIN SERPL-MCNC: 2.8 G/DL (ref 3.5–5)
ALBUMIN/GLOB SERPL: 0.6 (ref 1.1–2.2)
ALP SERPL-CCNC: 137 U/L (ref 45–117)
ALT SERPL-CCNC: 21 U/L (ref 12–78)
ANION GAP SERPL CALC-SCNC: 9 MMOL/L (ref 5–15)
AST SERPL-CCNC: 14 U/L (ref 15–37)
BASOPHILS # BLD: 0 K/UL (ref 0–0.1)
BASOPHILS NFR BLD: 1 % (ref 0–1)
BILIRUB SERPL-MCNC: 0.8 MG/DL (ref 0.2–1)
BUN SERPL-MCNC: 8 MG/DL (ref 6–20)
BUN/CREAT SERPL: 7 (ref 12–20)
CALCIUM SERPL-MCNC: 8.7 MG/DL (ref 8.5–10.1)
CHLORIDE SERPL-SCNC: 98 MMOL/L (ref 97–108)
CO2 SERPL-SCNC: 25 MMOL/L (ref 21–32)
CREAT SERPL-MCNC: 1.07 MG/DL (ref 0.7–1.3)
DIFFERENTIAL METHOD BLD: ABNORMAL
EOSINOPHIL # BLD: 0.2 K/UL (ref 0–0.4)
EOSINOPHIL NFR BLD: 5 % (ref 0–7)
ERYTHROCYTE [DISTWIDTH] IN BLOOD BY AUTOMATED COUNT: 12.3 % (ref 11.5–14.5)
FLUAV RNA SPEC QL NAA+PROBE: NOT DETECTED
FLUBV RNA SPEC QL NAA+PROBE: NOT DETECTED
GLOBULIN SER CALC-MCNC: 4.8 G/DL (ref 2–4)
GLUCOSE SERPL-MCNC: 312 MG/DL (ref 65–100)
HCT VFR BLD AUTO: 39.2 % (ref 36.6–50.3)
HGB BLD-MCNC: 13.4 G/DL (ref 12.1–17)
IMM GRANULOCYTES # BLD AUTO: 0 K/UL (ref 0–0.04)
IMM GRANULOCYTES NFR BLD AUTO: 1 % (ref 0–0.5)
LACTATE SERPL-SCNC: 2 MMOL/L (ref 0.4–2)
LYMPHOCYTES # BLD: 0.5 K/UL (ref 0.8–3.5)
LYMPHOCYTES NFR BLD: 12 % (ref 12–49)
MCH RBC QN AUTO: 29.1 PG (ref 26–34)
MCHC RBC AUTO-ENTMCNC: 34.2 G/DL (ref 30–36.5)
MCV RBC AUTO: 85.2 FL (ref 80–99)
MONOCYTES # BLD: 0.4 K/UL (ref 0–1)
MONOCYTES NFR BLD: 11 % (ref 5–13)
NEUTS SEG # BLD: 2.9 K/UL (ref 1.8–8)
NEUTS SEG NFR BLD: 70 % (ref 32–75)
NRBC # BLD: 0 K/UL (ref 0–0.01)
NRBC BLD-RTO: 0 PER 100 WBC
NT PRO BNP: 140 PG/ML (ref 0–125)
PLATELET # BLD AUTO: 270 K/UL (ref 150–400)
PMV BLD AUTO: 10.2 FL (ref 8.9–12.9)
POTASSIUM SERPL-SCNC: 4.1 MMOL/L (ref 3.5–5.1)
PROT SERPL-MCNC: 7.6 G/DL (ref 6.4–8.2)
RBC # BLD AUTO: 4.6 M/UL (ref 4.1–5.7)
RBC MORPH BLD: ABNORMAL
SARS-COV-2 RNA RESP QL NAA+PROBE: NOT DETECTED
SODIUM SERPL-SCNC: 132 MMOL/L (ref 136–145)
TROPONIN I SERPL HS-MCNC: 68 NG/L (ref 0–76)
TROPONIN I SERPL HS-MCNC: 76 NG/L (ref 0–76)
WBC # BLD AUTO: 4 K/UL (ref 4.1–11.1)

## 2023-07-14 PROCEDURE — 70491 CT SOFT TISSUE NECK W/DYE: CPT

## 2023-07-14 PROCEDURE — 85025 COMPLETE CBC W/AUTO DIFF WBC: CPT

## 2023-07-14 PROCEDURE — 71045 X-RAY EXAM CHEST 1 VIEW: CPT

## 2023-07-14 PROCEDURE — 84484 ASSAY OF TROPONIN QUANT: CPT

## 2023-07-14 PROCEDURE — 96361 HYDRATE IV INFUSION ADD-ON: CPT

## 2023-07-14 PROCEDURE — 6360000002 HC RX W HCPCS: Performed by: PHYSICIAN ASSISTANT

## 2023-07-14 PROCEDURE — 83605 ASSAY OF LACTIC ACID: CPT

## 2023-07-14 PROCEDURE — 96374 THER/PROPH/DIAG INJ IV PUSH: CPT

## 2023-07-14 PROCEDURE — 87636 SARSCOV2 & INF A&B AMP PRB: CPT

## 2023-07-14 PROCEDURE — 10061 I&D ABSCESS COMP/MULTIPLE: CPT

## 2023-07-14 PROCEDURE — 83880 ASSAY OF NATRIURETIC PEPTIDE: CPT

## 2023-07-14 PROCEDURE — 6370000000 HC RX 637 (ALT 250 FOR IP): Performed by: PHYSICIAN ASSISTANT

## 2023-07-14 PROCEDURE — 2580000003 HC RX 258: Performed by: PHYSICIAN ASSISTANT

## 2023-07-14 PROCEDURE — 87040 BLOOD CULTURE FOR BACTERIA: CPT

## 2023-07-14 PROCEDURE — 99285 EMERGENCY DEPT VISIT HI MDM: CPT

## 2023-07-14 PROCEDURE — 80053 COMPREHEN METABOLIC PANEL: CPT

## 2023-07-14 PROCEDURE — 6360000004 HC RX CONTRAST MEDICATION: Performed by: PHYSICIAN ASSISTANT

## 2023-07-14 PROCEDURE — 36415 COLL VENOUS BLD VENIPUNCTURE: CPT

## 2023-07-14 RX ORDER — DOXYCYCLINE HYCLATE 100 MG
100 TABLET ORAL
Status: COMPLETED | OUTPATIENT
Start: 2023-07-14 | End: 2023-07-14

## 2023-07-14 RX ORDER — ACETAMINOPHEN 500 MG
1000 TABLET ORAL
Status: COMPLETED | OUTPATIENT
Start: 2023-07-14 | End: 2023-07-14

## 2023-07-14 RX ORDER — LORAZEPAM 2 MG/ML
0.5 INJECTION INTRAMUSCULAR ONCE
Status: COMPLETED | OUTPATIENT
Start: 2023-07-14 | End: 2023-07-14

## 2023-07-14 RX ORDER — 0.9 % SODIUM CHLORIDE 0.9 %
1000 INTRAVENOUS SOLUTION INTRAVENOUS ONCE
Status: COMPLETED | OUTPATIENT
Start: 2023-07-14 | End: 2023-07-14

## 2023-07-14 RX ORDER — DOXYCYCLINE HYCLATE 100 MG
100 TABLET ORAL 2 TIMES DAILY
Qty: 20 TABLET | Refills: 0 | Status: SHIPPED | OUTPATIENT
Start: 2023-07-14 | End: 2023-07-24

## 2023-07-14 RX ADMIN — SODIUM CHLORIDE 1000 ML: 9 INJECTION, SOLUTION INTRAVENOUS at 16:37

## 2023-07-14 RX ADMIN — ACETAMINOPHEN 1000 MG: 500 TABLET ORAL at 16:37

## 2023-07-14 RX ADMIN — DOXYCYCLINE HYCLATE 100 MG: 100 TABLET, COATED ORAL at 20:34

## 2023-07-14 RX ADMIN — IOPAMIDOL 100 ML: 755 INJECTION, SOLUTION INTRAVENOUS at 18:10

## 2023-07-14 RX ADMIN — LORAZEPAM 0.5 MG: 2 INJECTION INTRAMUSCULAR; INTRAVENOUS at 19:39

## 2023-07-14 ASSESSMENT — PAIN - FUNCTIONAL ASSESSMENT: PAIN_FUNCTIONAL_ASSESSMENT: 0-10

## 2023-07-14 ASSESSMENT — PAIN SCALES - GENERAL: PAINLEVEL_OUTOF10: 3

## 2023-07-14 NOTE — ED NOTES
Report rcvd. Pt resting on stretcher; nad noted. Pt on monitor x3; vss and documented. Pt  has NS infusing at this time w/o difficulty. Pt awaiting trop reslts for dispo.      Mateusz Cardozo RN  07/14/23 1914

## 2023-07-14 NOTE — ED TRIAGE NOTES
Patient complains of SOB for 2 days. He had an MI with stent placement past month. Patient is tachycardic and febrile. He has an abscess on his left jaw.

## 2023-07-15 NOTE — DISCHARGE INSTRUCTIONS
Stop taking the Keflex (cephalexin) and Bactrim (sulfamethoxazole-trimethoprim). Start taking the doxycycline.

## 2023-07-15 NOTE — ED NOTES
Patient  given copy of dc instructions and 1 script(s). Patient verbalized understanding of instructions and script (s). Patient given a current medication reconciliation form and verbalized understanding of their medications. Patient verbalized understanding of the importance of discussing medications with his or her physician or clinic they will be following up with. Patient alert and oriented and in no acute distress. Patient discharged home ambulatory.        Delilah Hudson RN  07/14/23 1559

## 2023-07-15 NOTE — ED PROVIDER NOTES
[facility] EMERGENCY DEPT  EMERGENCY DEPARTMENT ENCOUNTER       Pt Name: Hilton Tong  MRN: [id number]  [address] [facility] 1980  Date of evaluation: 7/14/2023  Provider: AMOR Lowe   PCP: Davina Cano MD  Note Started: 10:39 PM EDT 7/14/23     CHIEF COMPLAINT       Chief Complaint   Patient presents with    Shortness of Breath        HISTORY OF PRESENT ILLNESS: 1 or more elements      History From: Patient  HPI Limitations: None     Hilton Tong is a 37 y.o. male who presents with shortness of breath and facial pain. The patient was seen here 4 days ago for an area of pain and swelling over the left side of his lower face. He was started on Keflex and Bactrim, which he has been taking as prescribed but reports the swelling is increasing in size. Yesterday, he developed chills, headache, and shortness of breath. He did not take his temperature at that time. Symptoms worsening today which prompted his visit to the ED. He is concerned about shortness of breath because he had an MI with stent placement last month. He notes that he had chest pain at that time and does not have any chest pain with this shortness of breath today. He did not know he had a fever until he came to the ED. He denies cough, congestion, vomiting, diarrhea, dysphagia. Nursing Notes were all reviewed and agreed with or any disagreements were addressed in the HPI. REVIEW OF SYSTEMS      Review of Systems     Positives and Pertinent negatives as per HPI.     PAST HISTORY     Past Medical History:  Past Medical History:   Diagnosis Date    Asthma     Diabetes ([address])     Gall stone 2022    Hemorrhoid     Hypertension     Melanoma ([address]) 2010    STOMACH       Past Surgical History:  Past Surgical History:   Procedure Laterality Date    CHOLECYSTECTOMY  12/06/2022    Dr Alexia Rodriguez    COLONOSCOPY N/A 06/23/2022    COLONOSCOPY performed by Julia Singletary MD at [address]  06/23/2022         CORONARY ANGIOPLASTY

## 2023-07-17 LAB
EKG ATRIAL RATE: 109 BPM
EKG DIAGNOSIS: NORMAL
EKG P AXIS: 59 DEGREES
EKG P-R INTERVAL: 122 MS
EKG Q-T INTERVAL: 316 MS
EKG QRS DURATION: 80 MS
EKG QTC CALCULATION (BAZETT): 425 MS
EKG R AXIS: -13 DEGREES
EKG T AXIS: 54 DEGREES
EKG VENTRICULAR RATE: 109 BPM

## 2023-08-02 ENCOUNTER — TRANSCRIBE ORDERS (OUTPATIENT)
Facility: HOSPITAL | Age: 43
End: 2023-08-02

## 2023-08-02 DIAGNOSIS — I21.4 NSTEMI (NON-ST ELEVATED MYOCARDIAL INFARCTION) (HCC): Primary | ICD-10-CM

## 2023-08-14 ENCOUNTER — HOSPITAL ENCOUNTER (OUTPATIENT)
Facility: HOSPITAL | Age: 43
Setting detail: RECURRING SERIES
Discharge: HOME | End: 2023-08-17

## 2023-08-14 VITALS — WEIGHT: 225.6 LBS | BODY MASS INDEX: 27.46 KG/M2

## 2023-08-14 ASSESSMENT — PATIENT HEALTH QUESTIONNAIRE - PHQ9
1. LITTLE INTEREST OR PLEASURE IN DOING THINGS: 1
SUM OF ALL RESPONSES TO PHQ QUESTIONS 1-9: 4
6. FEELING BAD ABOUT YOURSELF - OR THAT YOU ARE A FAILURE OR HAVE LET YOURSELF OR YOUR FAMILY DOWN: 1
9. THOUGHTS THAT YOU WOULD BE BETTER OFF DEAD, OR OF HURTING YOURSELF: 0
SUM OF ALL RESPONSES TO PHQ QUESTIONS 1-9: 4
3. TROUBLE FALLING OR STAYING ASLEEP: 0
10. IF YOU CHECKED OFF ANY PROBLEMS, HOW DIFFICULT HAVE THESE PROBLEMS MADE IT FOR YOU TO DO YOUR WORK, TAKE CARE OF THINGS AT HOME, OR GET ALONG WITH OTHER PEOPLE: 0
SUM OF ALL RESPONSES TO PHQ QUESTIONS 1-9: 4
8. MOVING OR SPEAKING SO SLOWLY THAT OTHER PEOPLE COULD HAVE NOTICED. OR THE OPPOSITE, BEING SO FIGETY OR RESTLESS THAT YOU HAVE BEEN MOVING AROUND A LOT MORE THAN USUAL: 0
SUM OF ALL RESPONSES TO PHQ9 QUESTIONS 1 & 2: 1
5. POOR APPETITE OR OVEREATING: 0
2. FEELING DOWN, DEPRESSED OR HOPELESS: 0
7. TROUBLE CONCENTRATING ON THINGS, SUCH AS READING THE NEWSPAPER OR WATCHING TELEVISION: 0
4. FEELING TIRED OR HAVING LITTLE ENERGY: 2
SUM OF ALL RESPONSES TO PHQ QUESTIONS 1-9: 4

## 2023-08-14 ASSESSMENT — LIFESTYLE VARIABLES: SMOKELESS_TOBACCO: NO

## 2023-08-14 ASSESSMENT — EJECTION FRACTION: EF_VALUE: 60

## 2023-08-16 ENCOUNTER — HOSPITAL ENCOUNTER (OUTPATIENT)
Facility: HOSPITAL | Age: 43
Setting detail: RECURRING SERIES
End: 2023-08-16
Payer: COMMERCIAL

## 2023-08-21 ENCOUNTER — APPOINTMENT (OUTPATIENT)
Facility: HOSPITAL | Age: 43
End: 2023-08-21
Payer: COMMERCIAL

## 2023-08-28 ENCOUNTER — HOSPITAL ENCOUNTER (OUTPATIENT)
Facility: HOSPITAL | Age: 43
Setting detail: RECURRING SERIES
End: 2023-08-28
Payer: COMMERCIAL

## 2023-08-30 ENCOUNTER — HOSPITAL ENCOUNTER (OUTPATIENT)
Facility: HOSPITAL | Age: 43
Setting detail: RECURRING SERIES
Discharge: HOME | End: 2023-09-02
Payer: COMMERCIAL

## 2023-08-30 VITALS — BODY MASS INDEX: 27.44 KG/M2 | WEIGHT: 225.4 LBS

## 2023-08-30 PROCEDURE — 93798 PHYS/QHP OP CAR RHAB W/ECG: CPT

## 2023-08-30 ASSESSMENT — EXERCISE STRESS TEST
PEAK_RPE: 12
PEAK_RPD: 0
PEAK_HR: 123
PEAK_RPE: 12
PEAK_BP: 160/98
PEAK_HR: 129
PEAK_METS: 2.8
PEAK_BP: 160/98

## 2023-08-30 ASSESSMENT — EJECTION FRACTION: EF_VALUE: 60

## 2023-08-30 ASSESSMENT — LIFESTYLE VARIABLES: SMOKELESS_TOBACCO: NO

## 2023-09-05 ENCOUNTER — HOSPITAL ENCOUNTER (OUTPATIENT)
Facility: HOSPITAL | Age: 43
Setting detail: RECURRING SERIES
Discharge: HOME | End: 2023-09-08
Payer: COMMERCIAL

## 2023-09-05 VITALS — WEIGHT: 226.8 LBS | BODY MASS INDEX: 27.61 KG/M2

## 2023-09-05 PROCEDURE — 93798 PHYS/QHP OP CAR RHAB W/ECG: CPT

## 2023-09-05 ASSESSMENT — EXERCISE STRESS TEST
PEAK_METS: 2.8
PEAK_RPE: 12
PEAK_HR: 124

## 2023-09-06 ENCOUNTER — HOSPITAL ENCOUNTER (OUTPATIENT)
Facility: HOSPITAL | Age: 43
Setting detail: RECURRING SERIES
Discharge: HOME | End: 2023-09-09
Payer: COMMERCIAL

## 2023-09-06 VITALS — BODY MASS INDEX: 27.51 KG/M2 | WEIGHT: 226 LBS

## 2023-09-06 PROCEDURE — 93798 PHYS/QHP OP CAR RHAB W/ECG: CPT

## 2023-09-06 ASSESSMENT — EXERCISE STRESS TEST
PEAK_METS: 2.8
PEAK_RPE: 12
PEAK_HR: 130

## 2023-09-11 ENCOUNTER — HOSPITAL ENCOUNTER (OUTPATIENT)
Facility: HOSPITAL | Age: 43
Setting detail: RECURRING SERIES
Discharge: HOME | End: 2023-09-14
Payer: COMMERCIAL

## 2023-09-11 VITALS — WEIGHT: 225 LBS | BODY MASS INDEX: 27.39 KG/M2

## 2023-09-11 PROCEDURE — 93798 PHYS/QHP OP CAR RHAB W/ECG: CPT

## 2023-09-11 ASSESSMENT — EXERCISE STRESS TEST
PEAK_RPE: 11
PEAK_METS: 2.8
PEAK_HR: 125

## 2023-09-12 ENCOUNTER — HOSPITAL ENCOUNTER (OUTPATIENT)
Facility: HOSPITAL | Age: 43
Setting detail: RECURRING SERIES
Discharge: HOME | End: 2023-09-15
Payer: COMMERCIAL

## 2023-09-12 VITALS — WEIGHT: 224.8 LBS | BODY MASS INDEX: 27.36 KG/M2

## 2023-09-12 PROCEDURE — 93797 PHYS/QHP OP CAR RHAB WO ECG: CPT

## 2023-09-12 PROCEDURE — 93798 PHYS/QHP OP CAR RHAB W/ECG: CPT

## 2023-09-12 ASSESSMENT — LIFESTYLE VARIABLES: SMOKELESS_TOBACCO: NO

## 2023-09-12 ASSESSMENT — EXERCISE STRESS TEST
PEAK_RPD: 0
PEAK_METS: 2.8
PEAK_RPE: 11
PEAK_BP: 160/98
PEAK_HR: 129

## 2023-09-12 ASSESSMENT — EJECTION FRACTION: EF_VALUE: 60

## 2023-09-12 NOTE — CARDIO/PULMONARY
Cardiac Rehab Nutrition Assessment- 1:1 Evaluation  2023      NAME: Amandeep Bocanegra : 1980 AGE: 37 y.o. GENDER: male  CARDIAC REHAB ADMITTING DIAGNOSIS: Non-ST elevation (NSTEMI) myocardial infarction [I21.4]    PROBLEM LIST:  Patient Active Problem List   Diagnosis    Acute pancreatitis         LABS:   No results found for: \"HBA1C\", \"[id number]\"  Lab Results   Component Value Date/Time    CHOL 215 10/27/2022 11:11 AM    HDL 21 10/27/2022 11:11 AM         MEDICATIONS/SUPPLEMENTS:   Scheduled Meds:  Continuous Infusions:  PRN Meds:. Prior to Admission medications    Medication Sig Start Date End Date Taking?  Authorizing Provider   [employer] ASPIRIN ADULT LOW DOSE 81 MG chewable tablet CHEW 1 TABLET BY MOUTH EVERY DAY 23   Historical Provider, MD   atorvastatin (LIPITOR) 80 MG tablet Take 1 tablet by mouth daily 23   Historical Provider, MD   glipiZIDE (GLUCOTROL) 5 MG tablet 1 tab(s) orally BID    Historical Provider, MD   isosorbide dinitrate (ISORDIL) 10 MG tablet Take 1 tablet by mouth in the morning and 1 tablet at noon and 1 tablet in the evening. 23   Historical Provider, MD   ticagrelor (BRILINTA) 90 MG TABS tablet 1 tab(s) orally 2 times a day 23   Historical Provider, MD   metoprolol tartrate (LOPRESSOR) 25 MG tablet TAKE 1/2 TABLET TWICE A DAY BY MOUTH 23   Historical Provider, MD   acetaminophen (TYLENOL) 500 MG tablet Take 2 tablets by mouth every 6 hours as needed 22   Ar Automatic Reconciliation   albuterol sulfate HFA (PROVENTIL;VENTOLIN;PROAIR) 108 (90 Base) MCG/ACT inhaler Inhale 2 puffs into the lungs every 4 hours as needed    Ar Automatic Reconciliation   atorvastatin (LIPITOR) 20 MG tablet Take 1 tablet by mouth 10/28/22   Ar Automatic Reconciliation   insulin glargine (LANTUS SOLOSTAR) 100 UNIT/ML injection pen Inject 40 Units into the skin daily    Ar Automatic Reconciliation   lisinopril (PRINIVIL;ZESTRIL) 10 MG tablet Take 1 tablet by mouth daily

## 2023-09-13 ENCOUNTER — HOSPITAL ENCOUNTER (OUTPATIENT)
Facility: HOSPITAL | Age: 43
Setting detail: RECURRING SERIES
Discharge: HOME | End: 2023-09-16
Payer: COMMERCIAL

## 2023-09-13 VITALS — BODY MASS INDEX: 27.34 KG/M2 | WEIGHT: 224.6 LBS

## 2023-09-13 PROCEDURE — 93798 PHYS/QHP OP CAR RHAB W/ECG: CPT

## 2023-09-13 ASSESSMENT — EXERCISE STRESS TEST
PEAK_RPE: 11
PEAK_HR: 131
PEAK_METS: 3.5

## 2023-09-13 NOTE — CARDIO/PULMONARY
Called Dr. Amaya Jenkins office and spoke to [facility] regarding pts high BP. Pt has been consistently 150s SBP/90s-100 DBP. Pt left with /101, HR 82bpm. Pt did endorse more stress than normal right now related to work and family. Pt has not been able to get into a cardiologist office for a follow up until November and is unsure who the MD is. Advised pt to check BP at home after relaxing and to monitor; if BP remains high and symptomatic then to call MD or go to ER. MD office stated they were able to move his cardiologist appt with Dr. Niru Key up to 09/27 and pt was aware.

## 2023-09-19 ENCOUNTER — HOSPITAL ENCOUNTER (OUTPATIENT)
Facility: HOSPITAL | Age: 43
Setting detail: RECURRING SERIES
Discharge: HOME | End: 2023-09-22
Payer: COMMERCIAL

## 2023-09-19 VITALS — BODY MASS INDEX: 27.3 KG/M2 | WEIGHT: 224.3 LBS

## 2023-09-19 PROCEDURE — 93798 PHYS/QHP OP CAR RHAB W/ECG: CPT

## 2023-09-19 ASSESSMENT — EXERCISE STRESS TEST
PEAK_METS: 3.5
PEAK_RPE: 11
PEAK_HR: 134

## 2023-09-20 ENCOUNTER — HOSPITAL ENCOUNTER (OUTPATIENT)
Facility: HOSPITAL | Age: 43
Setting detail: RECURRING SERIES
Discharge: HOME | End: 2023-09-23
Payer: COMMERCIAL

## 2023-09-20 VITALS — BODY MASS INDEX: 28.03 KG/M2 | WEIGHT: 230.3 LBS

## 2023-09-20 PROCEDURE — 93798 PHYS/QHP OP CAR RHAB W/ECG: CPT

## 2023-09-20 ASSESSMENT — LIFESTYLE VARIABLES: SMOKELESS_TOBACCO: NO

## 2023-09-20 ASSESSMENT — EJECTION FRACTION: EF_VALUE: 60

## 2023-09-20 ASSESSMENT — EXERCISE STRESS TEST
PEAK_RPE: 11
PEAK_RPD: 0
PEAK_HR: 125
PEAK_METS: 3.5

## 2023-09-25 ENCOUNTER — HOSPITAL ENCOUNTER (OUTPATIENT)
Facility: HOSPITAL | Age: 43
Setting detail: RECURRING SERIES
End: 2023-09-25
Payer: COMMERCIAL

## 2023-09-26 ENCOUNTER — APPOINTMENT (OUTPATIENT)
Facility: HOSPITAL | Age: 43
End: 2023-09-26
Payer: COMMERCIAL

## 2023-09-27 ENCOUNTER — APPOINTMENT (OUTPATIENT)
Facility: HOSPITAL | Age: 43
End: 2023-09-27
Payer: COMMERCIAL

## 2023-11-02 ENCOUNTER — TELEPHONE (OUTPATIENT)
Facility: HOSPITAL | Age: 43
End: 2023-11-02

## 2024-03-14 ENCOUNTER — HOSPITAL ENCOUNTER (OUTPATIENT)
Facility: HOSPITAL | Age: 44
Setting detail: RECURRING SERIES
Discharge: HOME | End: 2024-03-17
Payer: COMMERCIAL

## 2024-03-14 VITALS — BODY MASS INDEX: 29.53 KG/M2 | WEIGHT: 242.6 LBS

## 2024-03-14 PROCEDURE — 93798 PHYS/QHP OP CAR RHAB W/ECG: CPT

## 2024-03-14 PROCEDURE — 93797 PHYS/QHP OP CAR RHAB WO ECG: CPT

## 2024-03-14 RX ORDER — NITROGLYCERIN 0.3 MG/1
0.3 TABLET SUBLINGUAL EVERY 5 MIN PRN
COMMUNITY

## 2024-03-14 RX ORDER — AMLODIPINE BESYLATE 5 MG/1
5 TABLET ORAL DAILY
COMMUNITY

## 2024-03-14 ASSESSMENT — PATIENT HEALTH QUESTIONNAIRE - PHQ9
3. TROUBLE FALLING OR STAYING ASLEEP: MORE THAN HALF THE DAYS
8. MOVING OR SPEAKING SO SLOWLY THAT OTHER PEOPLE COULD HAVE NOTICED. OR THE OPPOSITE, BEING SO FIGETY OR RESTLESS THAT YOU HAVE BEEN MOVING AROUND A LOT MORE THAN USUAL: SEVERAL DAYS
2. FEELING DOWN, DEPRESSED OR HOPELESS: SEVERAL DAYS
SUM OF ALL RESPONSES TO PHQ QUESTIONS 1-9: 12
6. FEELING BAD ABOUT YOURSELF - OR THAT YOU ARE A FAILURE OR HAVE LET YOURSELF OR YOUR FAMILY DOWN: SEVERAL DAYS
SUM OF ALL RESPONSES TO PHQ9 QUESTIONS 1 & 2: 3
7. TROUBLE CONCENTRATING ON THINGS, SUCH AS READING THE NEWSPAPER OR WATCHING TELEVISION: SEVERAL DAYS
SUM OF ALL RESPONSES TO PHQ QUESTIONS 1-9: 12
4. FEELING TIRED OR HAVING LITTLE ENERGY: MORE THAN HALF THE DAYS
1. LITTLE INTEREST OR PLEASURE IN DOING THINGS: MORE THAN HALF THE DAYS
SUM OF ALL RESPONSES TO PHQ QUESTIONS 1-9: 12
5. POOR APPETITE OR OVEREATING: MORE THAN HALF THE DAYS
SUM OF ALL RESPONSES TO PHQ QUESTIONS 1-9: 12
9. THOUGHTS THAT YOU WOULD BE BETTER OFF DEAD, OR OF HURTING YOURSELF: NOT AT ALL

## 2024-03-14 ASSESSMENT — EXERCISE STRESS TEST
PEAK_METS: 2.7
PEAK_RPE: 13
PEAK_RPD: 0
PEAK_HR: 127

## 2024-03-14 ASSESSMENT — LIFESTYLE VARIABLES: SMOKELESS_TOBACCO: NO

## 2024-03-14 ASSESSMENT — EJECTION FRACTION: EF_VALUE: 60

## 2024-03-14 NOTE — CARDIO/PULMONARY
Pt returned to rehab today for first time since September, 2023. RN reviewed medications, medical history, updated MD information and pt completed exercise routine with modifications (see [employer] under media) Pt tolerated well.     Start @ 0746   End @ 5309

## 2024-03-19 ENCOUNTER — HOSPITAL ENCOUNTER (OUTPATIENT)
Facility: HOSPITAL | Age: 44
Setting detail: RECURRING SERIES
Discharge: HOME | End: 2024-03-22
Payer: COMMERCIAL

## 2024-03-19 VITALS — WEIGHT: 244.4 LBS | BODY MASS INDEX: 29.75 KG/M2

## 2024-03-19 PROCEDURE — 93798 PHYS/QHP OP CAR RHAB W/ECG: CPT

## 2024-03-19 ASSESSMENT — EXERCISE STRESS TEST
PEAK_METS: 2.7
PEAK_HR: 107

## 2024-03-20 ENCOUNTER — HOSPITAL ENCOUNTER (OUTPATIENT)
Facility: HOSPITAL | Age: 44
Setting detail: RECURRING SERIES
Discharge: HOME | End: 2024-03-23
Payer: COMMERCIAL

## 2024-03-20 VITALS — WEIGHT: 243.4 LBS | BODY MASS INDEX: 29.63 KG/M2

## 2024-03-20 PROCEDURE — 93798 PHYS/QHP OP CAR RHAB W/ECG: CPT

## 2024-03-20 ASSESSMENT — EXERCISE STRESS TEST
PEAK_HR: 114
PEAK_RPE: 10
PEAK_METS: 2.7

## 2024-03-27 ENCOUNTER — APPOINTMENT (OUTPATIENT)
Facility: HOSPITAL | Age: 44
End: 2024-03-27
Payer: COMMERCIAL

## 2024-04-02 ENCOUNTER — APPOINTMENT (OUTPATIENT)
Facility: HOSPITAL | Age: 44
End: 2024-04-02
Payer: COMMERCIAL

## 2024-04-03 ENCOUNTER — HOSPITAL ENCOUNTER (OUTPATIENT)
Facility: HOSPITAL | Age: 44
Setting detail: RECURRING SERIES
End: 2024-04-03
Payer: COMMERCIAL

## 2024-04-03 ENCOUNTER — APPOINTMENT (OUTPATIENT)
Facility: HOSPITAL | Age: 44
End: 2024-04-03
Payer: COMMERCIAL

## 2024-04-09 ENCOUNTER — HOSPITAL ENCOUNTER (OUTPATIENT)
Facility: HOSPITAL | Age: 44
Setting detail: RECURRING SERIES
Discharge: HOME | End: 2024-04-12
Payer: COMMERCIAL

## 2024-04-09 VITALS — BODY MASS INDEX: 29.21 KG/M2 | WEIGHT: 240 LBS

## 2024-04-09 PROCEDURE — 93798 PHYS/QHP OP CAR RHAB W/ECG: CPT

## 2024-04-09 ASSESSMENT — EXERCISE STRESS TEST
PEAK_RPD: 0
PEAK_RPE: 10
PEAK_METS: 2.7
PEAK_HR: 137

## 2024-04-09 ASSESSMENT — LIFESTYLE VARIABLES: SMOKELESS_TOBACCO: NO

## 2024-04-09 ASSESSMENT — EJECTION FRACTION: EF_VALUE: 60

## 2024-04-10 ENCOUNTER — HOSPITAL ENCOUNTER (OUTPATIENT)
Facility: HOSPITAL | Age: 44
Setting detail: RECURRING SERIES
Discharge: HOME | End: 2024-04-13
Payer: COMMERCIAL

## 2024-04-10 VITALS — BODY MASS INDEX: 29.6 KG/M2 | WEIGHT: 243.2 LBS

## 2024-04-10 PROCEDURE — 93797 PHYS/QHP OP CAR RHAB WO ECG: CPT

## 2024-04-10 PROCEDURE — 93798 PHYS/QHP OP CAR RHAB W/ECG: CPT

## 2024-04-10 ASSESSMENT — EXERCISE STRESS TEST
PEAK_METS: 2.7
PEAK_RPE: 10
PEAK_HR: 117

## 2024-04-16 ENCOUNTER — HOSPITAL ENCOUNTER (OUTPATIENT)
Facility: HOSPITAL | Age: 44
Setting detail: RECURRING SERIES
Discharge: HOME | End: 2024-04-19
Payer: COMMERCIAL

## 2024-04-16 VITALS — WEIGHT: 243.6 LBS | BODY MASS INDEX: 29.65 KG/M2

## 2024-04-16 PROCEDURE — 93798 PHYS/QHP OP CAR RHAB W/ECG: CPT

## 2024-04-16 ASSESSMENT — EXERCISE STRESS TEST
PEAK_HR: 114
PEAK_RPE: 10
PEAK_METS: 5.2

## 2024-04-17 ENCOUNTER — HOSPITAL ENCOUNTER (OUTPATIENT)
Facility: HOSPITAL | Age: 44
Setting detail: RECURRING SERIES
Discharge: HOME | End: 2024-04-20
Payer: COMMERCIAL

## 2024-04-17 VITALS — BODY MASS INDEX: 29.63 KG/M2 | WEIGHT: 243.4 LBS

## 2024-04-17 PROCEDURE — 93798 PHYS/QHP OP CAR RHAB W/ECG: CPT

## 2024-04-17 PROCEDURE — 93797 PHYS/QHP OP CAR RHAB WO ECG: CPT

## 2024-04-17 ASSESSMENT — EXERCISE STRESS TEST
PEAK_HR: 124
PEAK_RPE: 12
PEAK_METS: 5.2

## 2024-04-23 ENCOUNTER — HOSPITAL ENCOUNTER (OUTPATIENT)
Facility: HOSPITAL | Age: 44
Setting detail: RECURRING SERIES
Discharge: HOME | End: 2024-04-26
Payer: COMMERCIAL

## 2024-04-23 VITALS — BODY MASS INDEX: 29.46 KG/M2 | WEIGHT: 242 LBS

## 2024-04-23 PROCEDURE — 93798 PHYS/QHP OP CAR RHAB W/ECG: CPT

## 2024-04-23 ASSESSMENT — EXERCISE STRESS TEST
PEAK_RPE: 11
PEAK_HR: 120
PEAK_METS: 5.2

## 2024-05-01 ENCOUNTER — APPOINTMENT (OUTPATIENT)
Facility: HOSPITAL | Age: 44
End: 2024-05-01
Payer: COMMERCIAL

## 2024-05-07 ENCOUNTER — HOSPITAL ENCOUNTER (OUTPATIENT)
Facility: HOSPITAL | Age: 44
Setting detail: RECURRING SERIES
Discharge: HOME | End: 2024-05-10
Payer: COMMERCIAL

## 2024-05-07 VITALS — BODY MASS INDEX: 29.99 KG/M2 | WEIGHT: 246.4 LBS

## 2024-05-07 PROCEDURE — 93798 PHYS/QHP OP CAR RHAB W/ECG: CPT

## 2024-05-07 ASSESSMENT — EXERCISE STRESS TEST
PEAK_HR: 122
PEAK_RPD: 0
PEAK_RPE: 10
PEAK_METS: 5.2

## 2024-05-07 ASSESSMENT — EJECTION FRACTION: EF_VALUE: 60

## 2024-05-07 ASSESSMENT — LIFESTYLE VARIABLES: SMOKELESS_TOBACCO: NO

## 2024-05-08 ENCOUNTER — HOSPITAL ENCOUNTER (OUTPATIENT)
Facility: HOSPITAL | Age: 44
Setting detail: RECURRING SERIES
Discharge: HOME | End: 2024-05-11
Payer: COMMERCIAL

## 2024-05-08 VITALS — BODY MASS INDEX: 29.92 KG/M2 | WEIGHT: 245.8 LBS

## 2024-05-08 PROCEDURE — 93798 PHYS/QHP OP CAR RHAB W/ECG: CPT

## 2024-05-08 PROCEDURE — 93797 PHYS/QHP OP CAR RHAB WO ECG: CPT

## 2024-05-08 ASSESSMENT — EXERCISE STRESS TEST
PEAK_RPE: 10
PEAK_METS: 5.2
PEAK_HR: 115

## 2024-05-14 ENCOUNTER — APPOINTMENT (OUTPATIENT)
Facility: HOSPITAL | Age: 44
End: 2024-05-14
Payer: COMMERCIAL

## 2024-05-14 ENCOUNTER — HOSPITAL ENCOUNTER (OUTPATIENT)
Facility: HOSPITAL | Age: 44
Setting detail: RECURRING SERIES
Discharge: HOME | End: 2024-05-17
Payer: COMMERCIAL

## 2024-05-14 VITALS — BODY MASS INDEX: 30.16 KG/M2 | WEIGHT: 247.8 LBS

## 2024-05-14 PROCEDURE — 93798 PHYS/QHP OP CAR RHAB W/ECG: CPT

## 2024-05-14 ASSESSMENT — EXERCISE STRESS TEST
PEAK_METS: 5.2
PEAK_HR: 129
PEAK_RPE: 13

## 2024-05-15 ENCOUNTER — APPOINTMENT (OUTPATIENT)
Facility: HOSPITAL | Age: 44
End: 2024-05-15
Payer: COMMERCIAL

## 2024-05-21 ENCOUNTER — HOSPITAL ENCOUNTER (OUTPATIENT)
Facility: HOSPITAL | Age: 44
Setting detail: RECURRING SERIES
Discharge: HOME | End: 2024-05-24
Payer: COMMERCIAL

## 2024-05-21 VITALS — BODY MASS INDEX: 29.55 KG/M2 | WEIGHT: 242.8 LBS

## 2024-05-21 PROCEDURE — 93798 PHYS/QHP OP CAR RHAB W/ECG: CPT

## 2024-05-21 ASSESSMENT — EXERCISE STRESS TEST
PEAK_RPE: 11
PEAK_METS: 5.7
PEAK_HR: 141

## 2024-06-11 ENCOUNTER — HOSPITAL ENCOUNTER (OUTPATIENT)
Facility: HOSPITAL | Age: 44
Setting detail: RECURRING SERIES
Discharge: HOME | End: 2024-06-14
Payer: COMMERCIAL

## 2024-06-11 VITALS — WEIGHT: 247.2 LBS | BODY MASS INDEX: 30.09 KG/M2

## 2024-06-11 PROCEDURE — 93798 PHYS/QHP OP CAR RHAB W/ECG: CPT

## 2024-06-11 ASSESSMENT — EXERCISE STRESS TEST
PEAK_METS: 5.7
PEAK_HR: 141
PEAK_RPE: 11

## 2024-06-12 ENCOUNTER — APPOINTMENT (OUTPATIENT)
Facility: HOSPITAL | Age: 44
End: 2024-06-12
Payer: COMMERCIAL

## 2024-06-18 ENCOUNTER — HOSPITAL ENCOUNTER (OUTPATIENT)
Facility: HOSPITAL | Age: 44
Setting detail: RECURRING SERIES
Discharge: HOME | End: 2024-06-21
Payer: COMMERCIAL

## 2024-06-18 VITALS — WEIGHT: 246 LBS | BODY MASS INDEX: 29.94 KG/M2

## 2024-06-18 PROCEDURE — 93798 PHYS/QHP OP CAR RHAB W/ECG: CPT

## 2024-06-18 ASSESSMENT — EXERCISE STRESS TEST
PEAK_HR: 120
PEAK_RPE: 10
PEAK_METS: 5.7

## 2024-06-19 ENCOUNTER — HOSPITAL ENCOUNTER (OUTPATIENT)
Facility: HOSPITAL | Age: 44
Setting detail: RECURRING SERIES
Discharge: HOME | End: 2024-06-22
Payer: COMMERCIAL

## 2024-06-19 VITALS — WEIGHT: 244 LBS | BODY MASS INDEX: 29.7 KG/M2

## 2024-06-19 PROCEDURE — 93798 PHYS/QHP OP CAR RHAB W/ECG: CPT

## 2024-06-19 PROCEDURE — 93797 PHYS/QHP OP CAR RHAB WO ECG: CPT

## 2024-06-19 ASSESSMENT — EXERCISE STRESS TEST
PEAK_RPE: 10
PEAK_METS: 5.7
PEAK_HR: 126

## 2024-06-26 ENCOUNTER — HOSPITAL ENCOUNTER (OUTPATIENT)
Facility: HOSPITAL | Age: 44
Setting detail: RECURRING SERIES
Discharge: HOME | End: 2024-06-29
Payer: COMMERCIAL

## 2024-06-26 VITALS — BODY MASS INDEX: 29.87 KG/M2 | WEIGHT: 245.4 LBS

## 2024-06-26 PROCEDURE — 93798 PHYS/QHP OP CAR RHAB W/ECG: CPT

## 2024-06-26 RX ORDER — ISOSORBIDE MONONITRATE 30 MG/1
30 TABLET, EXTENDED RELEASE ORAL DAILY
COMMUNITY

## 2024-06-26 RX ORDER — METOPROLOL SUCCINATE 100 MG/1
100 TABLET, EXTENDED RELEASE ORAL DAILY
COMMUNITY

## 2024-06-26 ASSESSMENT — EXERCISE STRESS TEST
PEAK_RPE: 10
PEAK_HR: 126
PEAK_METS: 5.7

## 2024-07-02 ENCOUNTER — HOSPITAL ENCOUNTER (OUTPATIENT)
Facility: HOSPITAL | Age: 44
Setting detail: RECURRING SERIES
Discharge: HOME | End: 2024-07-05
Payer: COMMERCIAL

## 2024-07-02 VITALS — WEIGHT: 246.1 LBS | BODY MASS INDEX: 29.96 KG/M2

## 2024-07-02 PROCEDURE — 93798 PHYS/QHP OP CAR RHAB W/ECG: CPT

## 2024-07-02 ASSESSMENT — EXERCISE STRESS TEST
PEAK_HR: 131
PEAK_RPE: 11
PEAK_METS: 5.7

## 2024-07-09 ENCOUNTER — HOSPITAL ENCOUNTER (OUTPATIENT)
Facility: HOSPITAL | Age: 44
Setting detail: RECURRING SERIES
Discharge: HOME | End: 2024-07-12
Payer: COMMERCIAL

## 2024-07-09 VITALS — WEIGHT: 241.2 LBS | BODY MASS INDEX: 29.36 KG/M2

## 2024-07-09 PROCEDURE — 93798 PHYS/QHP OP CAR RHAB W/ECG: CPT

## 2024-07-09 ASSESSMENT — EXERCISE STRESS TEST
PEAK_HR: 122
PEAK_METS: 5.7
PEAK_RPE: 10

## 2024-07-10 ENCOUNTER — HOSPITAL ENCOUNTER (OUTPATIENT)
Facility: HOSPITAL | Age: 44
Setting detail: RECURRING SERIES
Discharge: HOME | End: 2024-07-13
Payer: COMMERCIAL

## 2024-07-10 VITALS — WEIGHT: 243 LBS | BODY MASS INDEX: 29.58 KG/M2

## 2024-07-10 PROCEDURE — 93798 PHYS/QHP OP CAR RHAB W/ECG: CPT

## 2024-07-10 ASSESSMENT — EXERCISE STRESS TEST
PEAK_HR: 116
PEAK_RPE: 10
PEAK_METS: 5.7

## 2024-07-16 ENCOUNTER — HOSPITAL ENCOUNTER (OUTPATIENT)
Facility: HOSPITAL | Age: 44
Setting detail: RECURRING SERIES
Discharge: HOME | End: 2024-07-19
Payer: COMMERCIAL

## 2024-07-16 VITALS — WEIGHT: 246.8 LBS | BODY MASS INDEX: 30.04 KG/M2

## 2024-07-16 PROCEDURE — 93798 PHYS/QHP OP CAR RHAB W/ECG: CPT

## 2024-07-16 ASSESSMENT — EXERCISE STRESS TEST
PEAK_RPE: 10
PEAK_HR: 123
PEAK_METS: 5.7

## 2024-07-23 ENCOUNTER — HOSPITAL ENCOUNTER (OUTPATIENT)
Facility: HOSPITAL | Age: 44
Setting detail: RECURRING SERIES
Discharge: HOME | End: 2024-07-26
Payer: COMMERCIAL

## 2024-07-23 VITALS — WEIGHT: 246 LBS | BODY MASS INDEX: 29.94 KG/M2

## 2024-07-23 PROCEDURE — 93798 PHYS/QHP OP CAR RHAB W/ECG: CPT

## 2024-07-24 ENCOUNTER — HOSPITAL ENCOUNTER (OUTPATIENT)
Facility: HOSPITAL | Age: 44
Setting detail: RECURRING SERIES
Discharge: HOME | End: 2024-07-27
Payer: COMMERCIAL

## 2024-07-24 VITALS — BODY MASS INDEX: 30.19 KG/M2 | WEIGHT: 248 LBS

## 2024-07-24 PROCEDURE — 93798 PHYS/QHP OP CAR RHAB W/ECG: CPT

## 2024-07-30 ENCOUNTER — HOSPITAL ENCOUNTER (OUTPATIENT)
Facility: HOSPITAL | Age: 44
Setting detail: RECURRING SERIES
Discharge: HOME | End: 2024-08-02
Payer: COMMERCIAL

## 2024-07-30 VITALS — BODY MASS INDEX: 30.28 KG/M2 | WEIGHT: 248.8 LBS

## 2024-07-30 PROCEDURE — 93798 PHYS/QHP OP CAR RHAB W/ECG: CPT

## 2024-07-30 ASSESSMENT — EXERCISE STRESS TEST
PEAK_HR: 122
PEAK_METS: 5.7
PEAK_RPE: 10
PEAK_RPD: 0
PEAK_RPE: 10

## 2024-07-30 ASSESSMENT — LIFESTYLE VARIABLES: SMOKELESS_TOBACCO: NO

## 2024-07-30 ASSESSMENT — EJECTION FRACTION: EF_VALUE: 60

## 2024-07-30 NOTE — CARDIO/PULMONARY
DISCHARGE SUMMARY NOTE  2024      NAME: Michael Fournier : 1980 AGE: 44 y.o.  GENDER: male    CARDIAC REHAB ADMITTING DIAGNOSIS: H/O heart artery stent [Z95.5]    REFERRING PHYSICIAN: Meaghan Vega MD    MEDICAL HX:  Past Medical History:   Diagnosis Date    Asthma     Diabetes (HCC)     Gall stone     Hemorrhoid     Hypertension     Melanoma (HCC)     STOMACH       LABS:     No results found for: \"HBA1C\", \"CAY4OCSM\"  Lab Results   Component Value Date/Time    CHOL 215 10/27/2022 11:11 AM    HDL 21 10/27/2022 11:11 AM    .6 10/27/2022 11:11 AM    VLDL 34.4 10/27/2022 11:11 AM         ANTHROPOMETRICS:      Ht Readings from Last 1 Encounters:   23 1.93 m (6' 4\")      Wt Readings from Last 1 Encounters:   24 112.5 kg (248 lb)        WAIST:          PROGRAM SUMMARY:    Michael Fournier completed 36/36 sessions in the Cardiac Rehab program. He will continue exercising 3 x week and focus on diet and nutrition at home. He attended 5 classes and is aware of his cardiac risk factors and cardiac medications. Weight loss was n/a (+23 lbs which was his goal) MET level increase from 2.8 to 5.7. 6MWT distance increased from 350 m to 402 m.      Questions addressed. Discharge plans discussed. Michael Fournier verbalized understanding.      ALF MARKS RN

## 2024-08-03 ENCOUNTER — HOSPITAL ENCOUNTER (EMERGENCY)
Facility: HOSPITAL | Age: 44
Discharge: HOME | End: 2024-08-03

## 2024-08-03 VITALS
HEART RATE: 71 BPM | RESPIRATION RATE: 18 BRPM | HEIGHT: 76 IN | OXYGEN SATURATION: 99 % | DIASTOLIC BLOOD PRESSURE: 60 MMHG | TEMPERATURE: 98.1 F | BODY MASS INDEX: 30.2 KG/M2 | SYSTOLIC BLOOD PRESSURE: 99 MMHG | WEIGHT: 248 LBS

## 2024-08-03 ASSESSMENT — PAIN - FUNCTIONAL ASSESSMENT: PAIN_FUNCTIONAL_ASSESSMENT: NONE - DENIES PAIN

## 2024-08-03 NOTE — ED TRIAGE NOTES
Pt reports having a stent and balloon placed at [facility] yesterday and now has been experiencing dizziness and lightheadedness for the last 3 hours.     Pt denies chest pain and SOB

## 2024-08-14 ENCOUNTER — TRANSCRIBE ORDERS (OUTPATIENT)
Facility: HOSPITAL | Age: 44
End: 2024-08-14

## 2024-08-14 DIAGNOSIS — Z95.5 S/P PRIMARY ANGIOPLASTY WITH CORONARY STENT: Primary | ICD-10-CM

## 2024-09-06 ENCOUNTER — HOSPITAL ENCOUNTER (OUTPATIENT)
Facility: HOSPITAL | Age: 44
Setting detail: RECURRING SERIES
Discharge: HOME | End: 2024-09-09
Payer: COMMERCIAL

## 2024-09-06 VITALS — WEIGHT: 247 LBS | BODY MASS INDEX: 30.07 KG/M2

## 2024-09-06 PROCEDURE — 93798 PHYS/QHP OP CAR RHAB W/ECG: CPT

## 2024-09-06 ASSESSMENT — PATIENT HEALTH QUESTIONNAIRE - PHQ9
SUM OF ALL RESPONSES TO PHQ QUESTIONS 1-9: 8
3. TROUBLE FALLING OR STAYING ASLEEP: NOT AT ALL
SUM OF ALL RESPONSES TO PHQ QUESTIONS 1-9: 8
8. MOVING OR SPEAKING SO SLOWLY THAT OTHER PEOPLE COULD HAVE NOTICED. OR THE OPPOSITE, BEING SO FIGETY OR RESTLESS THAT YOU HAVE BEEN MOVING AROUND A LOT MORE THAN USUAL: SEVERAL DAYS
4. FEELING TIRED OR HAVING LITTLE ENERGY: MORE THAN HALF THE DAYS
10. IF YOU CHECKED OFF ANY PROBLEMS, HOW DIFFICULT HAVE THESE PROBLEMS MADE IT FOR YOU TO DO YOUR WORK, TAKE CARE OF THINGS AT HOME, OR GET ALONG WITH OTHER PEOPLE: NOT DIFFICULT AT ALL
2. FEELING DOWN, DEPRESSED OR HOPELESS: SEVERAL DAYS
9. THOUGHTS THAT YOU WOULD BE BETTER OFF DEAD, OR OF HURTING YOURSELF: NOT AT ALL
SUM OF ALL RESPONSES TO PHQ QUESTIONS 1-9: 8
1. LITTLE INTEREST OR PLEASURE IN DOING THINGS: MORE THAN HALF THE DAYS
SUM OF ALL RESPONSES TO PHQ9 QUESTIONS 1 & 2: 3
SUM OF ALL RESPONSES TO PHQ QUESTIONS 1-9: 8
6. FEELING BAD ABOUT YOURSELF - OR THAT YOU ARE A FAILURE OR HAVE LET YOURSELF OR YOUR FAMILY DOWN: SEVERAL DAYS
7. TROUBLE CONCENTRATING ON THINGS, SUCH AS READING THE NEWSPAPER OR WATCHING TELEVISION: NOT AT ALL
5. POOR APPETITE OR OVEREATING: SEVERAL DAYS

## 2024-09-06 ASSESSMENT — LIFESTYLE VARIABLES: SMOKELESS_TOBACCO: NO

## 2024-09-06 ASSESSMENT — EXERCISE STRESS TEST
PEAK_RPE: 13
PEAK_BP: 172/99
PEAK_HR: 98
PEAK_RPE: 13
PEAK_RPD: 0
PEAK_HR: 122
PEAK_METS: 2.8

## 2024-09-06 ASSESSMENT — EJECTION FRACTION: EF_VALUE: 60

## 2025-04-11 ENCOUNTER — HOSPITAL ENCOUNTER (INPATIENT)
Facility: HOSPITAL | Age: 45
LOS: 16 days | Discharge: HOME HEALTH | DRG: 165 | End: 2025-04-27
Attending: INTERNAL MEDICINE | Admitting: STUDENT IN AN ORGANIZED HEALTH CARE EDUCATION/TRAINING PROGRAM
Payer: COMMERCIAL

## 2025-04-11 ENCOUNTER — HOSPITAL ENCOUNTER (EMERGENCY)
Facility: HOSPITAL | Age: 45
Discharge: ANOTHER HOSPITAL | End: 2025-04-11
Attending: EMERGENCY MEDICINE
Payer: COMMERCIAL

## 2025-04-11 ENCOUNTER — APPOINTMENT (OUTPATIENT)
Facility: HOSPITAL | Age: 45
End: 2025-04-11
Payer: COMMERCIAL

## 2025-04-11 VITALS
TEMPERATURE: 97.7 F | SYSTOLIC BLOOD PRESSURE: 167 MMHG | RESPIRATION RATE: 17 BRPM | OXYGEN SATURATION: 100 % | BODY MASS INDEX: 30.44 KG/M2 | HEART RATE: 84 BPM | HEIGHT: 76 IN | DIASTOLIC BLOOD PRESSURE: 83 MMHG | WEIGHT: 250 LBS

## 2025-04-11 DIAGNOSIS — R07.9 CHEST PAIN, UNSPECIFIED TYPE: Primary | ICD-10-CM

## 2025-04-11 DIAGNOSIS — R07.9 CHEST PAIN, UNSPECIFIED TYPE: ICD-10-CM

## 2025-04-11 DIAGNOSIS — E11.65 TYPE 2 DIABETES MELLITUS WITH HYPERGLYCEMIA, WITH LONG-TERM CURRENT USE OF INSULIN (HCC): ICD-10-CM

## 2025-04-11 DIAGNOSIS — I25.118 CORONARY ARTERY DISEASE OF NATIVE ARTERY OF NATIVE HEART WITH STABLE ANGINA PECTORIS: ICD-10-CM

## 2025-04-11 DIAGNOSIS — I24.9 ACUTE CORONARY SYNDROME (HCC): Primary | ICD-10-CM

## 2025-04-11 DIAGNOSIS — I25.10 CORONARY ARTERY DISEASE INVOLVING NATIVE HEART, UNSPECIFIED VESSEL OR LESION TYPE, UNSPECIFIED WHETHER ANGINA PRESENT: ICD-10-CM

## 2025-04-11 DIAGNOSIS — R73.09 HEMOGLOBIN A1C GREATER THAN 9%, INDICATING POOR DIABETIC CONTROL: ICD-10-CM

## 2025-04-11 DIAGNOSIS — I20.0 UNSTABLE ANGINA (HCC): ICD-10-CM

## 2025-04-11 DIAGNOSIS — Z79.4 TYPE 2 DIABETES MELLITUS WITH HYPERGLYCEMIA, WITH LONG-TERM CURRENT USE OF INSULIN (HCC): ICD-10-CM

## 2025-04-11 DIAGNOSIS — Z95.1 S/P CABG (CORONARY ARTERY BYPASS GRAFT): ICD-10-CM

## 2025-04-11 PROBLEM — E78.5 HYPERLIPIDEMIA: Status: ACTIVE | Noted: 2023-10-02

## 2025-04-11 PROBLEM — Z95.820 S/P ANGIOPLASTY WITH STENT: Status: ACTIVE | Noted: 2023-10-02

## 2025-04-11 PROBLEM — N52.1 ERECTILE DYSFUNCTION DUE TO DISEASES CLASSIFIED ELSEWHERE: Status: ACTIVE | Noted: 2023-10-02

## 2025-04-11 PROBLEM — E78.5 DYSLIPIDEMIA (HIGH LDL; LOW HDL): Status: ACTIVE | Noted: 2023-10-02

## 2025-04-11 PROBLEM — I10 ESSENTIAL HYPERTENSION, MALIGNANT: Status: ACTIVE | Noted: 2023-10-02

## 2025-04-11 PROBLEM — I21.3 STEMI (ST ELEVATION MYOCARDIAL INFARCTION) (HCC): Status: ACTIVE | Noted: 2023-10-29

## 2025-04-11 LAB
ALBUMIN SERPL-MCNC: 3.7 G/DL (ref 3.5–5)
ALBUMIN/GLOB SERPL: 0.8 (ref 1.1–2.2)
ALP SERPL-CCNC: 163 U/L (ref 45–117)
ALT SERPL-CCNC: 37 U/L (ref 12–78)
ANION GAP SERPL CALC-SCNC: 10 MMOL/L (ref 2–12)
AST SERPL-CCNC: 12 U/L (ref 15–37)
BASOPHILS # BLD: 0.05 K/UL (ref 0–0.1)
BASOPHILS NFR BLD: 0.5 % (ref 0–1)
BILIRUB SERPL-MCNC: 0.8 MG/DL (ref 0.2–1)
BUN SERPL-MCNC: 9 MG/DL (ref 6–20)
BUN/CREAT SERPL: 10 (ref 12–20)
CALCIUM SERPL-MCNC: 9.6 MG/DL (ref 8.5–10.1)
CHLORIDE SERPL-SCNC: 103 MMOL/L (ref 97–108)
CO2 SERPL-SCNC: 24 MMOL/L (ref 21–32)
CREAT SERPL-MCNC: 0.92 MG/DL (ref 0.7–1.3)
DIFFERENTIAL METHOD BLD: NORMAL
EOSINOPHIL # BLD: 0.33 K/UL (ref 0–0.4)
EOSINOPHIL NFR BLD: 3.2 % (ref 0–7)
ERYTHROCYTE [DISTWIDTH] IN BLOOD BY AUTOMATED COUNT: 14.2 % (ref 11.5–14.5)
GLOBULIN SER CALC-MCNC: 4.4 G/DL (ref 2–4)
GLUCOSE BLD STRIP.AUTO-MCNC: 207 MG/DL (ref 65–117)
GLUCOSE SERPL-MCNC: 245 MG/DL (ref 65–100)
HCT VFR BLD AUTO: 44.9 % (ref 36.6–50.3)
HGB BLD-MCNC: 15.8 G/DL (ref 12.1–17)
IMM GRANULOCYTES # BLD AUTO: 0.03 K/UL (ref 0–0.04)
IMM GRANULOCYTES NFR BLD AUTO: 0.3 % (ref 0–0.5)
LYMPHOCYTES # BLD: 2.39 K/UL (ref 0.8–3.5)
LYMPHOCYTES NFR BLD: 23.3 % (ref 12–49)
MCH RBC QN AUTO: 30.2 PG (ref 26–34)
MCHC RBC AUTO-ENTMCNC: 35.2 G/DL (ref 30–36.5)
MCV RBC AUTO: 85.9 FL (ref 80–99)
MONOCYTES # BLD: 0.65 K/UL (ref 0–1)
MONOCYTES NFR BLD: 6.3 % (ref 5–13)
NEUTS SEG # BLD: 6.79 K/UL (ref 1.8–8)
NEUTS SEG NFR BLD: 66.4 % (ref 32–75)
NRBC # BLD: 0 K/UL (ref 0–0.01)
NRBC BLD-RTO: 0 PER 100 WBC
PLATELET # BLD AUTO: 231 K/UL (ref 150–400)
PMV BLD AUTO: 11.3 FL (ref 8.9–12.9)
POTASSIUM SERPL-SCNC: 3.6 MMOL/L (ref 3.5–5.1)
PROT SERPL-MCNC: 8.1 G/DL (ref 6.4–8.2)
RBC # BLD AUTO: 5.23 M/UL (ref 4.1–5.7)
SERVICE CMNT-IMP: ABNORMAL
SODIUM SERPL-SCNC: 137 MMOL/L (ref 136–145)
TROPONIN I SERPL HS-MCNC: 42 NG/L (ref 0–76)
TROPONIN I SERPL HS-MCNC: 43 NG/L (ref 0–76)
WBC # BLD AUTO: 10.2 K/UL (ref 4.1–11.1)

## 2025-04-11 PROCEDURE — 93005 ELECTROCARDIOGRAM TRACING: CPT | Performed by: EMERGENCY MEDICINE

## 2025-04-11 PROCEDURE — 6360000002 HC RX W HCPCS: Performed by: STUDENT IN AN ORGANIZED HEALTH CARE EDUCATION/TRAINING PROGRAM

## 2025-04-11 PROCEDURE — G0379 DIRECT REFER HOSPITAL OBSERV: HCPCS

## 2025-04-11 PROCEDURE — 6370000000 HC RX 637 (ALT 250 FOR IP): Performed by: STUDENT IN AN ORGANIZED HEALTH CARE EDUCATION/TRAINING PROGRAM

## 2025-04-11 PROCEDURE — 82962 GLUCOSE BLOOD TEST: CPT

## 2025-04-11 PROCEDURE — 2500000003 HC RX 250 WO HCPCS: Performed by: STUDENT IN AN ORGANIZED HEALTH CARE EDUCATION/TRAINING PROGRAM

## 2025-04-11 PROCEDURE — 99285 EMERGENCY DEPT VISIT HI MDM: CPT

## 2025-04-11 PROCEDURE — 85025 COMPLETE CBC W/AUTO DIFF WBC: CPT

## 2025-04-11 PROCEDURE — 80053 COMPREHEN METABOLIC PANEL: CPT

## 2025-04-11 PROCEDURE — 36415 COLL VENOUS BLD VENIPUNCTURE: CPT

## 2025-04-11 PROCEDURE — 84484 ASSAY OF TROPONIN QUANT: CPT

## 2025-04-11 PROCEDURE — 2060000000 HC ICU INTERMEDIATE R&B

## 2025-04-11 PROCEDURE — 71045 X-RAY EXAM CHEST 1 VIEW: CPT

## 2025-04-11 PROCEDURE — 6370000000 HC RX 637 (ALT 250 FOR IP): Performed by: EMERGENCY MEDICINE

## 2025-04-11 PROCEDURE — G0378 HOSPITAL OBSERVATION PER HR: HCPCS

## 2025-04-11 RX ORDER — ACETAMINOPHEN 650 MG/1
650 SUPPOSITORY RECTAL EVERY 6 HOURS PRN
Status: DISCONTINUED | OUTPATIENT
Start: 2025-04-11 | End: 2025-04-21

## 2025-04-11 RX ORDER — ACETAMINOPHEN 325 MG/1
650 TABLET ORAL EVERY 6 HOURS PRN
Status: DISCONTINUED | OUTPATIENT
Start: 2025-04-11 | End: 2025-04-14

## 2025-04-11 RX ORDER — ONDANSETRON 2 MG/ML
4 INJECTION INTRAMUSCULAR; INTRAVENOUS EVERY 6 HOURS PRN
Status: DISCONTINUED | OUTPATIENT
Start: 2025-04-11 | End: 2025-04-21

## 2025-04-11 RX ORDER — AMLODIPINE BESYLATE 5 MG/1
5 TABLET ORAL DAILY
Status: DISCONTINUED | OUTPATIENT
Start: 2025-04-11 | End: 2025-04-21

## 2025-04-11 RX ORDER — INSULIN GLARGINE 100 [IU]/ML
30 INJECTION, SOLUTION SUBCUTANEOUS NIGHTLY
Status: DISCONTINUED | OUTPATIENT
Start: 2025-04-11 | End: 2025-04-15

## 2025-04-11 RX ORDER — ONDANSETRON 4 MG/1
4 TABLET, ORALLY DISINTEGRATING ORAL EVERY 8 HOURS PRN
Status: DISCONTINUED | OUTPATIENT
Start: 2025-04-11 | End: 2025-04-21

## 2025-04-11 RX ORDER — MAGNESIUM SULFATE IN WATER 40 MG/ML
2000 INJECTION, SOLUTION INTRAVENOUS PRN
Status: DISCONTINUED | OUTPATIENT
Start: 2025-04-11 | End: 2025-04-21

## 2025-04-11 RX ORDER — ASPIRIN 81 MG/1
81 TABLET, CHEWABLE ORAL DAILY
Status: DISCONTINUED | OUTPATIENT
Start: 2025-04-11 | End: 2025-04-21

## 2025-04-11 RX ORDER — SODIUM CHLORIDE 9 MG/ML
INJECTION, SOLUTION INTRAVENOUS PRN
Status: DISCONTINUED | OUTPATIENT
Start: 2025-04-11 | End: 2025-04-21

## 2025-04-11 RX ORDER — POLYETHYLENE GLYCOL 3350 17 G/17G
17 POWDER, FOR SOLUTION ORAL DAILY PRN
Status: DISCONTINUED | OUTPATIENT
Start: 2025-04-11 | End: 2025-04-21

## 2025-04-11 RX ORDER — NITROGLYCERIN 0.4 MG/1
0.4 TABLET SUBLINGUAL EVERY 5 MIN PRN
Status: DISCONTINUED | OUTPATIENT
Start: 2025-04-11 | End: 2025-04-11 | Stop reason: HOSPADM

## 2025-04-11 RX ORDER — SODIUM CHLORIDE 0.9 % (FLUSH) 0.9 %
5-40 SYRINGE (ML) INJECTION PRN
Status: DISCONTINUED | OUTPATIENT
Start: 2025-04-11 | End: 2025-04-21

## 2025-04-11 RX ORDER — GLUCAGON 1 MG/ML
1 KIT INJECTION PRN
Status: DISCONTINUED | OUTPATIENT
Start: 2025-04-11 | End: 2025-04-20 | Stop reason: SDUPTHER

## 2025-04-11 RX ORDER — POTASSIUM CHLORIDE 1500 MG/1
40 TABLET, EXTENDED RELEASE ORAL PRN
Status: DISCONTINUED | OUTPATIENT
Start: 2025-04-11 | End: 2025-04-21

## 2025-04-11 RX ORDER — DEXTROSE MONOHYDRATE 100 MG/ML
INJECTION, SOLUTION INTRAVENOUS CONTINUOUS PRN
Status: DISCONTINUED | OUTPATIENT
Start: 2025-04-11 | End: 2025-04-20

## 2025-04-11 RX ORDER — ASPIRIN 325 MG
325 TABLET ORAL
Status: COMPLETED | OUTPATIENT
Start: 2025-04-11 | End: 2025-04-11

## 2025-04-11 RX ORDER — ATORVASTATIN CALCIUM 40 MG/1
80 TABLET, FILM COATED ORAL DAILY
Status: DISCONTINUED | OUTPATIENT
Start: 2025-04-11 | End: 2025-04-21

## 2025-04-11 RX ORDER — INSULIN LISPRO 100 [IU]/ML
0-8 INJECTION, SOLUTION INTRAVENOUS; SUBCUTANEOUS
Status: DISCONTINUED | OUTPATIENT
Start: 2025-04-11 | End: 2025-04-20

## 2025-04-11 RX ORDER — SODIUM CHLORIDE 0.9 % (FLUSH) 0.9 %
5-40 SYRINGE (ML) INJECTION EVERY 12 HOURS SCHEDULED
Status: DISCONTINUED | OUTPATIENT
Start: 2025-04-11 | End: 2025-04-21

## 2025-04-11 RX ORDER — ENOXAPARIN SODIUM 150 MG/ML
1 INJECTION SUBCUTANEOUS ONCE
Status: COMPLETED | OUTPATIENT
Start: 2025-04-11 | End: 2025-04-11

## 2025-04-11 RX ORDER — POTASSIUM CHLORIDE 7.45 MG/ML
10 INJECTION INTRAVENOUS PRN
Status: DISCONTINUED | OUTPATIENT
Start: 2025-04-11 | End: 2025-04-21

## 2025-04-11 RX ORDER — METOPROLOL SUCCINATE 50 MG/1
100 TABLET, EXTENDED RELEASE ORAL DAILY
Status: DISCONTINUED | OUTPATIENT
Start: 2025-04-11 | End: 2025-04-21

## 2025-04-11 RX ORDER — LISINOPRIL 20 MG/1
40 TABLET ORAL DAILY
Status: DISCONTINUED | OUTPATIENT
Start: 2025-04-11 | End: 2025-04-21

## 2025-04-11 RX ADMIN — NITROGLYCERIN 1 INCH: 20 OINTMENT TOPICAL at 13:38

## 2025-04-11 RX ADMIN — ASPIRIN 325 MG: 325 TABLET ORAL at 13:38

## 2025-04-11 RX ADMIN — NITROGLYCERIN 0.4 MG: 0.4 TABLET, ORALLY DISINTEGRATING SUBLINGUAL at 13:38

## 2025-04-11 RX ADMIN — AMLODIPINE BESYLATE 5 MG: 5 TABLET ORAL at 21:26

## 2025-04-11 RX ADMIN — ATORVASTATIN CALCIUM 80 MG: 40 TABLET, FILM COATED ORAL at 21:26

## 2025-04-11 RX ADMIN — INSULIN GLARGINE 30 UNITS: 100 INJECTION, SOLUTION SUBCUTANEOUS at 21:26

## 2025-04-11 RX ADMIN — METOPROLOL SUCCINATE 100 MG: 50 TABLET, EXTENDED RELEASE ORAL at 21:26

## 2025-04-11 RX ADMIN — LISINOPRIL 40 MG: 40 TABLET ORAL at 21:26

## 2025-04-11 RX ADMIN — ASPIRIN 81 MG: 81 TABLET, CHEWABLE ORAL at 21:26

## 2025-04-11 RX ADMIN — SODIUM CHLORIDE, PRESERVATIVE FREE 10 ML: 5 INJECTION INTRAVENOUS at 21:27

## 2025-04-11 RX ADMIN — ENOXAPARIN SODIUM 120 MG: 150 INJECTION SUBCUTANEOUS at 21:27

## 2025-04-11 RX ADMIN — TICAGRELOR 90 MG: 90 TABLET ORAL at 21:26

## 2025-04-11 RX ADMIN — INSULIN LISPRO 2 UNITS: 100 INJECTION, SOLUTION INTRAVENOUS; SUBCUTANEOUS at 21:27

## 2025-04-11 ASSESSMENT — PAIN DESCRIPTION - LOCATION: LOCATION: CHEST

## 2025-04-11 ASSESSMENT — HEART SCORE: ECG: NORMAL

## 2025-04-11 ASSESSMENT — PAIN SCALES - GENERAL: PAINLEVEL_OUTOF10: 8

## 2025-04-11 ASSESSMENT — PAIN - FUNCTIONAL ASSESSMENT: PAIN_FUNCTIONAL_ASSESSMENT: 0-10

## 2025-04-11 NOTE — ED NOTES
Patient loaded onto EMS stretcher for ambulance transport to [facility] at this time.  Patient respirations even and unlabored, no acute distress on transfer of care.

## 2025-04-11 NOTE — ED NOTES
TRANSFER - OUT REPORT:    TELEPHONEVerbal report given to JAQUELINE HOLLINGSWORTH RN on Michael Fournier  being transferred to [facility] ROOM 2107 for routine progression of patient care   .  [phone] / [phone]     Report consisted of patient's Situation, Background, Assessment and   Recommendations(SBAR).     Information from the following report(s) Nurse Handoff Report, ED Encounter Summary, MAR, Recent Results, and Cardiac Rhythm NSR  was reviewed with the receiving nurse.    [place] Assessment:                           Lines:   Peripheral IV 04/11/25 Left Antecubital (Active)        Opportunity for questions and clarification was provided.      Patient transported with:  Monitor, EMS

## 2025-04-11 NOTE — ED NOTES
Patient here with c/o chest pain.  Patient entered into hospital c/o pain to left upper chest, patient in distress stating \"it feels like I'm having another heart attack\", and patient rushed from wait room into the ER.  Patient anxious at this time, states he woke up about an hour ago with pain, states pain ongoing since then.  Patient reports slight shortness of breath, but denies cough or fevers.  Patient states he took two SL nitro tabs at home with only slight relief.  Patient denies alcohol or tobacco use, reports very rare use of marijuana.  Patient reports hx of stents.              Emergency Department Nursing Plan of Care       The Nursing Plan of Care is developed from the Nursing assessment and Emergency Department Attending provider initial evaluation.  The plan of care may be reviewed in the “ED Provider note”.      The Plan of Care was developed with the following considerations:  Patient / Family readiness to learn indicated by:verbalized understanding  Persons(s) to be included in education: patient  Barriers to Learning/Limitations:None      Signed     Helen Diamond RN    4/11/2025   1:30 PM

## 2025-04-11 NOTE — ED PROVIDER NOTES
[facility] EMERGENCY DEPARTMENT  EMERGENCY DEPARTMENT ENCOUNTER       Pt Name: Michael Fournier  MRN: [id number]  Birthdate 1980  Date of evaluation: 4/11/2025  Provider: Dominic Templeton MD   PCP: Marbella Perkins APRN - NP  Note Started: 1:47 PM EDT 4/11/25     CHIEF COMPLAINT       Chief Complaint   Patient presents with    Chest Pain     Woke up hour PTA with chest pain         HISTORY OF PRESENT ILLNESS: 1 or more elements      History From: patient, friend History limited by: none     Michael Fournier is a 45 y.o. male with a history of diabetes, CAD and hypertension who presents to the emergency department with a chief complaint of chest pain.  Patient reports a \"pounding\" left-sided chest pain which radiates upwards and chest.  Reports pain is consistent with prior heart attacks in the past.  Reports tried nitro at home with some improvement pain was initially intermittent but then returned.    Patient reports some nausea.  No associated shortness of breath.  Denies vomiting or diarrhea.  No leg swelling.  Denies any tobacco/alcohol/cocaine use.    Please See MDM for Additional Details of the HPI/PMH  Nursing Notes were all reviewed and agreed with or any disagreements were addressed in the HPI.     REVIEW OF SYSTEMS        Positives and Pertinent negatives as per HPI.    PAST HISTORY     Past Medical History:  Past Medical History:   Diagnosis Date    Asthma     Diabetes (HCC)     Gall stone 2022    Hemorrhoid     Hypertension     Melanoma (HCC) 2010    STOMACH       Past Surgical History:  Past Surgical History:   Procedure Laterality Date    CARDIAC CATHETERIZATION  08/07/2024    stent and  PTCA at [facility]    CHOLECYSTECTOMY  12/06/2022    Dr Cedric Wilson    COLONOSCOPY N/A 06/23/2022    COLONOSCOPY performed by Facundo Sandy MD at [facility] ENDOSCOPY    COLONOSCOPY,DEMETRIS PRUITT  06/23/2022         CORONARY ANGIOPLASTY      GI      COLONOSCOPY    GI      HEMORRHOIDECTOMY    OTHER SURGICAL HISTORY

## 2025-04-12 LAB
ANION GAP SERPL CALC-SCNC: 4 MMOL/L (ref 2–12)
APTT PPP: 30.1 SEC (ref 22.1–31)
BASOPHILS # BLD: 0.05 K/UL (ref 0–0.1)
BASOPHILS NFR BLD: 0.5 % (ref 0–1)
BUN SERPL-MCNC: 8 MG/DL (ref 6–20)
BUN/CREAT SERPL: 14 (ref 12–20)
CALCIUM SERPL-MCNC: 8.9 MG/DL (ref 8.5–10.1)
CHLORIDE SERPL-SCNC: 107 MMOL/L (ref 97–108)
CHOLEST SERPL-MCNC: 93 MG/DL
CO2 SERPL-SCNC: 26 MMOL/L (ref 21–32)
COMMENT:: NORMAL
CREAT SERPL-MCNC: 0.59 MG/DL (ref 0.7–1.3)
DIFFERENTIAL METHOD BLD: ABNORMAL
EOSINOPHIL # BLD: 0.43 K/UL (ref 0–0.4)
EOSINOPHIL NFR BLD: 4.3 % (ref 0–7)
ERYTHROCYTE [DISTWIDTH] IN BLOOD BY AUTOMATED COUNT: 14.6 % (ref 11.5–14.5)
EST. AVERAGE GLUCOSE BLD GHB EST-MCNC: 275 MG/DL
GLUCOSE BLD STRIP.AUTO-MCNC: 169 MG/DL (ref 65–117)
GLUCOSE BLD STRIP.AUTO-MCNC: 185 MG/DL (ref 65–117)
GLUCOSE BLD STRIP.AUTO-MCNC: 192 MG/DL (ref 65–117)
GLUCOSE BLD STRIP.AUTO-MCNC: 224 MG/DL (ref 65–117)
GLUCOSE SERPL-MCNC: 201 MG/DL (ref 65–100)
HBA1C MFR BLD: 11.2 % (ref 4–5.6)
HCT VFR BLD AUTO: 42.9 % (ref 36.6–50.3)
HDLC SERPL-MCNC: 26 MG/DL
HDLC SERPL: 3.6 (ref 0–5)
HGB BLD-MCNC: 14.3 G/DL (ref 12.1–17)
IMM GRANULOCYTES # BLD AUTO: 0.05 K/UL (ref 0–0.04)
IMM GRANULOCYTES NFR BLD AUTO: 0.5 % (ref 0–0.5)
INR PPP: 1.1 (ref 0.9–1.1)
LDLC SERPL CALC-MCNC: 34 MG/DL (ref 0–100)
LYMPHOCYTES # BLD: 2.24 K/UL (ref 0.8–3.5)
LYMPHOCYTES NFR BLD: 22.3 % (ref 12–49)
MCH RBC QN AUTO: 29.5 PG (ref 26–34)
MCHC RBC AUTO-ENTMCNC: 33.3 G/DL (ref 30–36.5)
MCV RBC AUTO: 88.5 FL (ref 80–99)
MONOCYTES # BLD: 0.74 K/UL (ref 0–1)
MONOCYTES NFR BLD: 7.4 % (ref 5–13)
NEUTS SEG # BLD: 6.55 K/UL (ref 1.8–8)
NEUTS SEG NFR BLD: 65 % (ref 32–75)
NRBC # BLD: 0 K/UL (ref 0–0.01)
NRBC BLD-RTO: 0 PER 100 WBC
PLATELET # BLD AUTO: 223 K/UL (ref 150–400)
PMV BLD AUTO: 11 FL (ref 8.9–12.9)
POTASSIUM SERPL-SCNC: 3.6 MMOL/L (ref 3.5–5.1)
PROTHROMBIN TIME: 11.5 SEC (ref 9.2–11.2)
RBC # BLD AUTO: 4.85 M/UL (ref 4.1–5.7)
SERVICE CMNT-IMP: ABNORMAL
SODIUM SERPL-SCNC: 137 MMOL/L (ref 136–145)
SPECIMEN HOLD: NORMAL
THERAPEUTIC RANGE: NORMAL SECS (ref 58–77)
TRIGL SERPL-MCNC: 165 MG/DL
TROPONIN I SERPL HS-MCNC: 102 NG/L (ref 0–76)
TROPONIN I SERPL HS-MCNC: 103 NG/L (ref 0–76)
UFH PPP CHRO-ACNC: 0.14 IU/ML
UFH PPP CHRO-ACNC: 0.25 IU/ML
UFH PPP CHRO-ACNC: 0.34 IU/ML
VLDLC SERPL CALC-MCNC: 33 MG/DL
WBC # BLD AUTO: 10.1 K/UL (ref 4.1–11.1)

## 2025-04-12 PROCEDURE — 6360000002 HC RX W HCPCS: Performed by: INTERNAL MEDICINE

## 2025-04-12 PROCEDURE — 2500000003 HC RX 250 WO HCPCS: Performed by: STUDENT IN AN ORGANIZED HEALTH CARE EDUCATION/TRAINING PROGRAM

## 2025-04-12 PROCEDURE — 36415 COLL VENOUS BLD VENIPUNCTURE: CPT

## 2025-04-12 PROCEDURE — 82962 GLUCOSE BLOOD TEST: CPT

## 2025-04-12 PROCEDURE — 85025 COMPLETE CBC W/AUTO DIFF WBC: CPT

## 2025-04-12 PROCEDURE — 84484 ASSAY OF TROPONIN QUANT: CPT

## 2025-04-12 PROCEDURE — 85520 HEPARIN ASSAY: CPT

## 2025-04-12 PROCEDURE — 85730 THROMBOPLASTIN TIME PARTIAL: CPT

## 2025-04-12 PROCEDURE — 6370000000 HC RX 637 (ALT 250 FOR IP): Performed by: STUDENT IN AN ORGANIZED HEALTH CARE EDUCATION/TRAINING PROGRAM

## 2025-04-12 PROCEDURE — 80048 BASIC METABOLIC PNL TOTAL CA: CPT

## 2025-04-12 PROCEDURE — 85610 PROTHROMBIN TIME: CPT

## 2025-04-12 PROCEDURE — 83036 HEMOGLOBIN GLYCOSYLATED A1C: CPT

## 2025-04-12 PROCEDURE — 93005 ELECTROCARDIOGRAM TRACING: CPT | Performed by: INTERNAL MEDICINE

## 2025-04-12 PROCEDURE — 2060000000 HC ICU INTERMEDIATE R&B

## 2025-04-12 PROCEDURE — 80061 LIPID PANEL: CPT

## 2025-04-12 RX ORDER — HEPARIN SODIUM 10000 [USP'U]/100ML
5-30 INJECTION, SOLUTION INTRAVENOUS CONTINUOUS
Status: DISCONTINUED | OUTPATIENT
Start: 2025-04-12 | End: 2025-04-14

## 2025-04-12 RX ORDER — HEPARIN SODIUM 1000 [USP'U]/ML
4000 INJECTION, SOLUTION INTRAVENOUS; SUBCUTANEOUS PRN
Status: DISCONTINUED | OUTPATIENT
Start: 2025-04-12 | End: 2025-04-21

## 2025-04-12 RX ORDER — HEPARIN SODIUM 1000 [USP'U]/ML
4000 INJECTION, SOLUTION INTRAVENOUS; SUBCUTANEOUS ONCE
Status: COMPLETED | OUTPATIENT
Start: 2025-04-12 | End: 2025-04-12

## 2025-04-12 RX ORDER — HEPARIN SODIUM 1000 [USP'U]/ML
2000 INJECTION, SOLUTION INTRAVENOUS; SUBCUTANEOUS PRN
Status: DISCONTINUED | OUTPATIENT
Start: 2025-04-12 | End: 2025-04-21

## 2025-04-12 RX ORDER — PREDNISONE 20 MG/1
40 TABLET ORAL EVERY 8 HOURS
Status: COMPLETED | OUTPATIENT
Start: 2025-04-13 | End: 2025-04-14

## 2025-04-12 RX ADMIN — NITROGLYCERIN 1 INCH: 20 OINTMENT TOPICAL at 12:27

## 2025-04-12 RX ADMIN — ATORVASTATIN CALCIUM 80 MG: 40 TABLET, FILM COATED ORAL at 08:36

## 2025-04-12 RX ADMIN — LISINOPRIL 40 MG: 40 TABLET ORAL at 08:37

## 2025-04-12 RX ADMIN — HEPARIN SODIUM 4000 UNITS: 1000 INJECTION INTRAVENOUS; SUBCUTANEOUS at 09:40

## 2025-04-12 RX ADMIN — NITROGLYCERIN 1 INCH: 20 OINTMENT TOPICAL at 06:12

## 2025-04-12 RX ADMIN — SODIUM CHLORIDE, PRESERVATIVE FREE 10 ML: 5 INJECTION INTRAVENOUS at 09:32

## 2025-04-12 RX ADMIN — INSULIN LISPRO 2 UNITS: 100 INJECTION, SOLUTION INTRAVENOUS; SUBCUTANEOUS at 12:26

## 2025-04-12 RX ADMIN — ASPIRIN 81 MG: 81 TABLET, CHEWABLE ORAL at 08:37

## 2025-04-12 RX ADMIN — AMLODIPINE BESYLATE 5 MG: 5 TABLET ORAL at 08:37

## 2025-04-12 RX ADMIN — TICAGRELOR 90 MG: 90 TABLET ORAL at 20:41

## 2025-04-12 RX ADMIN — INSULIN LISPRO 2 UNITS: 100 INJECTION, SOLUTION INTRAVENOUS; SUBCUTANEOUS at 17:25

## 2025-04-12 RX ADMIN — TICAGRELOR 90 MG: 90 TABLET ORAL at 08:36

## 2025-04-12 RX ADMIN — HEPARIN SODIUM AND DEXTROSE 9 UNITS/KG/HR: 10000; 5 INJECTION INTRAVENOUS at 09:44

## 2025-04-12 RX ADMIN — SODIUM CHLORIDE, PRESERVATIVE FREE 10 ML: 5 INJECTION INTRAVENOUS at 20:40

## 2025-04-12 RX ADMIN — HEPARIN SODIUM 2000 UNITS: 1000 INJECTION INTRAVENOUS; SUBCUTANEOUS at 23:30

## 2025-04-12 RX ADMIN — METOPROLOL SUCCINATE 100 MG: 50 TABLET, EXTENDED RELEASE ORAL at 08:36

## 2025-04-12 RX ADMIN — INSULIN GLARGINE 30 UNITS: 100 INJECTION, SOLUTION SUBCUTANEOUS at 20:40

## 2025-04-12 ASSESSMENT — PAIN DESCRIPTION - FREQUENCY: FREQUENCY: CONTINUOUS

## 2025-04-12 ASSESSMENT — PAIN SCALES - GENERAL
PAINLEVEL_OUTOF10: 1
PAINLEVEL_OUTOF10: 2
PAINLEVEL_OUTOF10: 0
PAINLEVEL_OUTOF10: 0

## 2025-04-12 ASSESSMENT — PAIN DESCRIPTION - ORIENTATION: ORIENTATION: LEFT

## 2025-04-12 ASSESSMENT — PAIN DESCRIPTION - DESCRIPTORS: DESCRIPTORS: ACHING

## 2025-04-12 ASSESSMENT — PAIN DESCRIPTION - PAIN TYPE: TYPE: ACUTE PAIN

## 2025-04-12 ASSESSMENT — PAIN DESCRIPTION - ONSET: ONSET: GRADUAL

## 2025-04-12 ASSESSMENT — PAIN DESCRIPTION - LOCATION
LOCATION: CHEST
LOCATION: CHEST

## 2025-04-12 NOTE — H&P
condition necessitates hospital stay.      Code Status: Full  DVT Prophylaxis: Lovenox  GI Prophylaxis: Not indicated  Baseline: Independent    Subjective:   CHIEF COMPLAINT: Unstable angina-transfer    HISTORY OF PRESENT ILLNESS:     Michael Fournier is a 45 y.o.  male with PMHx as listed below presenting as transfer from [facility] after presenting with complaints of substernal chest pain associated with lightheadedness and shortness of breath.  Patient reports for the past 2 weeks he has been experiencing intermittent mild chest discomfort and dyspnea on exertion.  Today, noted much more persistent and severe discomfort associated with a pounding sensation described as a painful 8/10 squeezing feeling.  Has pertinent history of prior MI manage at [facility] status post KATALINA per patient currently on aspirin and Brilinta.  Reports he took his home nitroglycerin with modest improvement, but recurrent pain prompting presentation to the emergency department.  Denies alcohol or illicit drug use.  Has recently started smoking socially again reporting 1 black and mild on occasion.    At OSH, patient hypertensive 170s/80s, tachycardic in 100s (sinus tachycardia), saturating upper 90s on room air.  CXR negative for acute process.  High-sensitivity troponin 43 => 42, CBC and CMP grossly unremarkable with exception of hyperglycemia to 245.  Patient given nitroglycerin and full-strength aspirin by ED provider    On arrival, patient afebrile and hemodynamically stable (hypertensive 160s/80s), saturating upper 90s on room air.  Reports mild residual chest discomfort significant improved from prior.  No other complaints or concerns voiced.    We were asked to admit for work up and evaluation of the above problems.     Past Medical History:   Diagnosis Date    Asthma     Diabetes (HCC)     Gall stone 2022    Hemorrhoid     Hypertension     Melanoma (HCC) 2010    STOMACH        Past Surgical History:   Procedure Laterality

## 2025-04-13 ENCOUNTER — APPOINTMENT (OUTPATIENT)
Facility: HOSPITAL | Age: 45
DRG: 165 | End: 2025-04-13
Attending: INTERNAL MEDICINE
Payer: COMMERCIAL

## 2025-04-13 LAB
ANION GAP SERPL CALC-SCNC: 2 MMOL/L (ref 2–12)
BUN SERPL-MCNC: 8 MG/DL (ref 6–20)
BUN/CREAT SERPL: 11 (ref 12–20)
CALCIUM SERPL-MCNC: 8.5 MG/DL (ref 8.5–10.1)
CHLORIDE SERPL-SCNC: 105 MMOL/L (ref 97–108)
CO2 SERPL-SCNC: 26 MMOL/L (ref 21–32)
CREAT SERPL-MCNC: 0.7 MG/DL (ref 0.7–1.3)
EKG ATRIAL RATE: 70 BPM
EKG DIAGNOSIS: NORMAL
EKG P AXIS: 18 DEGREES
EKG P-R INTERVAL: 134 MS
EKG Q-T INTERVAL: 370 MS
EKG QRS DURATION: 88 MS
EKG QTC CALCULATION (BAZETT): 399 MS
EKG R AXIS: -16 DEGREES
EKG T AXIS: 20 DEGREES
EKG VENTRICULAR RATE: 70 BPM
ERYTHROCYTE [DISTWIDTH] IN BLOOD BY AUTOMATED COUNT: 14.3 % (ref 11.5–14.5)
GLUCOSE BLD STRIP.AUTO-MCNC: 231 MG/DL (ref 65–117)
GLUCOSE BLD STRIP.AUTO-MCNC: 264 MG/DL (ref 65–117)
GLUCOSE BLD STRIP.AUTO-MCNC: 337 MG/DL (ref 65–117)
GLUCOSE BLD STRIP.AUTO-MCNC: 375 MG/DL (ref 65–117)
GLUCOSE SERPL-MCNC: 251 MG/DL (ref 65–100)
HCT VFR BLD AUTO: 43.2 % (ref 36.6–50.3)
HGB BLD-MCNC: 14.5 G/DL (ref 12.1–17)
MCH RBC QN AUTO: 30 PG (ref 26–34)
MCHC RBC AUTO-ENTMCNC: 33.6 G/DL (ref 30–36.5)
MCV RBC AUTO: 89.3 FL (ref 80–99)
NRBC # BLD: 0 K/UL (ref 0–0.01)
NRBC BLD-RTO: 0 PER 100 WBC
PLATELET # BLD AUTO: 211 K/UL (ref 150–400)
PMV BLD AUTO: 10.9 FL (ref 8.9–12.9)
POTASSIUM SERPL-SCNC: 3.5 MMOL/L (ref 3.5–5.1)
RBC # BLD AUTO: 4.84 M/UL (ref 4.1–5.7)
SERVICE CMNT-IMP: ABNORMAL
SODIUM SERPL-SCNC: 133 MMOL/L (ref 136–145)
UFH PPP CHRO-ACNC: 0.27 IU/ML
UFH PPP CHRO-ACNC: 0.35 IU/ML
UFH PPP CHRO-ACNC: 0.39 IU/ML
WBC # BLD AUTO: 8.2 K/UL (ref 4.1–11.1)

## 2025-04-13 PROCEDURE — 85520 HEPARIN ASSAY: CPT

## 2025-04-13 PROCEDURE — 36415 COLL VENOUS BLD VENIPUNCTURE: CPT

## 2025-04-13 PROCEDURE — 6360000002 HC RX W HCPCS: Performed by: INTERNAL MEDICINE

## 2025-04-13 PROCEDURE — 70100 X-RAY EXAM OF JAW <4VIEWS: CPT

## 2025-04-13 PROCEDURE — 82962 GLUCOSE BLOOD TEST: CPT

## 2025-04-13 PROCEDURE — 80048 BASIC METABOLIC PNL TOTAL CA: CPT

## 2025-04-13 PROCEDURE — 6370000000 HC RX 637 (ALT 250 FOR IP): Performed by: INTERNAL MEDICINE

## 2025-04-13 PROCEDURE — 2500000003 HC RX 250 WO HCPCS: Performed by: STUDENT IN AN ORGANIZED HEALTH CARE EDUCATION/TRAINING PROGRAM

## 2025-04-13 PROCEDURE — 85027 COMPLETE CBC AUTOMATED: CPT

## 2025-04-13 PROCEDURE — 6370000000 HC RX 637 (ALT 250 FOR IP): Performed by: STUDENT IN AN ORGANIZED HEALTH CARE EDUCATION/TRAINING PROGRAM

## 2025-04-13 PROCEDURE — 2060000000 HC ICU INTERMEDIATE R&B

## 2025-04-13 RX ADMIN — INSULIN LISPRO 2 UNITS: 100 INJECTION, SOLUTION INTRAVENOUS; SUBCUTANEOUS at 09:06

## 2025-04-13 RX ADMIN — MELATONIN 3 MG: at 21:49

## 2025-04-13 RX ADMIN — PREDNISONE 40 MG: 20 TABLET ORAL at 13:27

## 2025-04-13 RX ADMIN — HEPARIN SODIUM AND DEXTROSE 13 UNITS/KG/HR: 10000; 5 INJECTION INTRAVENOUS at 08:58

## 2025-04-13 RX ADMIN — TICAGRELOR 90 MG: 90 TABLET ORAL at 08:59

## 2025-04-13 RX ADMIN — ATORVASTATIN CALCIUM 80 MG: 40 TABLET, FILM COATED ORAL at 08:59

## 2025-04-13 RX ADMIN — INSULIN LISPRO 6 UNITS: 100 INJECTION, SOLUTION INTRAVENOUS; SUBCUTANEOUS at 16:54

## 2025-04-13 RX ADMIN — SODIUM CHLORIDE, PRESERVATIVE FREE 5 ML: 5 INJECTION INTRAVENOUS at 09:00

## 2025-04-13 RX ADMIN — LISINOPRIL 40 MG: 40 TABLET ORAL at 08:59

## 2025-04-13 RX ADMIN — NITROGLYCERIN 1 INCH: 20 OINTMENT TOPICAL at 09:12

## 2025-04-13 RX ADMIN — SODIUM CHLORIDE, PRESERVATIVE FREE 10 ML: 5 INJECTION INTRAVENOUS at 21:51

## 2025-04-13 RX ADMIN — METOPROLOL SUCCINATE 100 MG: 50 TABLET, EXTENDED RELEASE ORAL at 08:58

## 2025-04-13 RX ADMIN — HEPARIN SODIUM 2000 UNITS: 1000 INJECTION INTRAVENOUS; SUBCUTANEOUS at 06:40

## 2025-04-13 RX ADMIN — INSULIN LISPRO 4 UNITS: 100 INJECTION, SOLUTION INTRAVENOUS; SUBCUTANEOUS at 12:02

## 2025-04-13 RX ADMIN — NITROGLYCERIN 1 INCH: 20 OINTMENT TOPICAL at 12:58

## 2025-04-13 RX ADMIN — NITROGLYCERIN 1 INCH: 20 OINTMENT TOPICAL at 18:42

## 2025-04-13 RX ADMIN — ASPIRIN 81 MG: 81 TABLET, CHEWABLE ORAL at 08:59

## 2025-04-13 RX ADMIN — TICAGRELOR 90 MG: 90 TABLET ORAL at 21:49

## 2025-04-13 RX ADMIN — INSULIN GLARGINE 30 UNITS: 100 INJECTION, SOLUTION SUBCUTANEOUS at 21:48

## 2025-04-13 RX ADMIN — PREDNISONE 40 MG: 20 TABLET ORAL at 21:49

## 2025-04-13 RX ADMIN — AMLODIPINE BESYLATE 5 MG: 5 TABLET ORAL at 08:59

## 2025-04-13 RX ADMIN — INSULIN LISPRO 8 UNITS: 100 INJECTION, SOLUTION INTRAVENOUS; SUBCUTANEOUS at 21:48

## 2025-04-13 ASSESSMENT — PAIN SCALES - GENERAL
PAINLEVEL_OUTOF10: 0

## 2025-04-13 ASSESSMENT — PAIN - FUNCTIONAL ASSESSMENT: PAIN_FUNCTIONAL_ASSESSMENT: ACTIVITIES ARE NOT PREVENTED

## 2025-04-14 ENCOUNTER — APPOINTMENT (OUTPATIENT)
Facility: HOSPITAL | Age: 45
DRG: 165 | End: 2025-04-14
Attending: INTERNAL MEDICINE
Payer: COMMERCIAL

## 2025-04-14 PROBLEM — I65.23 BILATERAL CAROTID ARTERY STENOSIS: Status: ACTIVE | Noted: 2025-04-14

## 2025-04-14 PROBLEM — Z01.810 PREOP CARDIOVASCULAR EXAM: Status: ACTIVE | Noted: 2025-04-14

## 2025-04-14 LAB
ANION GAP SERPL CALC-SCNC: 6 MMOL/L (ref 2–12)
APPEARANCE UR: ABNORMAL
ARTERIAL PATENCY WRIST A: ABNORMAL
BACTERIA URNS QL MICRO: NEGATIVE /HPF
BASE DEFICIT BLDA-SCNC: 2.9 MMOL/L
BDY SITE: ABNORMAL
BILIRUB UR QL: NEGATIVE
BUN SERPL-MCNC: 13 MG/DL (ref 6–20)
BUN/CREAT SERPL: 18 (ref 12–20)
CALCIUM SERPL-MCNC: 8.9 MG/DL (ref 8.5–10.1)
CHLORIDE SERPL-SCNC: 103 MMOL/L (ref 97–108)
CO2 SERPL-SCNC: 22 MMOL/L (ref 21–32)
COLOR UR: ABNORMAL
CREAT SERPL-MCNC: 0.73 MG/DL (ref 0.7–1.3)
ECHO BSA: 2.39 M2
ECHO LV EDV A4C: 48 ML
ECHO LV EDV INDEX A4C: 20 ML/M2
ECHO LV EF PHYSICIAN: 55 %
ECHO LV EJECTION FRACTION A4C: 52 %
ECHO LV ESV A4C: 23 ML
ECHO LV ESV INDEX A4C: 10 ML/M2
EKG ATRIAL RATE: 106 BPM
EKG ATRIAL RATE: 77 BPM
EKG DIAGNOSIS: NORMAL
EKG DIAGNOSIS: NORMAL
EKG P AXIS: 21 DEGREES
EKG P AXIS: 85 DEGREES
EKG P-R INTERVAL: 118 MS
EKG P-R INTERVAL: 140 MS
EKG Q-T INTERVAL: 340 MS
EKG Q-T INTERVAL: 376 MS
EKG QRS DURATION: 90 MS
EKG QRS DURATION: 90 MS
EKG QTC CALCULATION (BAZETT): 425 MS
EKG QTC CALCULATION (BAZETT): 451 MS
EKG R AXIS: -20 DEGREES
EKG R AXIS: -23 DEGREES
EKG T AXIS: 24 DEGREES
EKG T AXIS: 61 DEGREES
EKG VENTRICULAR RATE: 106 BPM
EKG VENTRICULAR RATE: 77 BPM
EPITH CASTS URNS QL MICRO: ABNORMAL /LPF
ERYTHROCYTE [DISTWIDTH] IN BLOOD BY AUTOMATED COUNT: 14.4 % (ref 11.5–14.5)
GLUCOSE BLD STRIP.AUTO-MCNC: 304 MG/DL (ref 65–117)
GLUCOSE BLD STRIP.AUTO-MCNC: 313 MG/DL (ref 65–117)
GLUCOSE BLD STRIP.AUTO-MCNC: 315 MG/DL (ref 65–117)
GLUCOSE BLD STRIP.AUTO-MCNC: 368 MG/DL (ref 65–117)
GLUCOSE SERPL-MCNC: 333 MG/DL (ref 65–100)
GLUCOSE UR STRIP.AUTO-MCNC: 250 MG/DL
HCO3 BLDA-SCNC: 20 MMOL/L (ref 22–26)
HCT VFR BLD AUTO: 44.4 % (ref 36.6–50.3)
HGB BLD-MCNC: 15 G/DL (ref 12.1–17)
HGB UR QL STRIP: NEGATIVE
HYALINE CASTS URNS QL MICRO: ABNORMAL /LPF (ref 0–2)
INR PPP: 1.1 (ref 0.9–1.1)
KETONES UR QL STRIP.AUTO: 15 MG/DL
LEUKOCYTE ESTERASE UR QL STRIP.AUTO: NEGATIVE
MAGNESIUM SERPL-MCNC: 2.2 MG/DL (ref 1.6–2.4)
MCH RBC QN AUTO: 29.6 PG (ref 26–34)
MCHC RBC AUTO-ENTMCNC: 33.8 G/DL (ref 30–36.5)
MCV RBC AUTO: 87.7 FL (ref 80–99)
NITRITE UR QL STRIP.AUTO: NEGATIVE
NRBC # BLD: 0 K/UL (ref 0–0.01)
NRBC BLD-RTO: 0 PER 100 WBC
NT PRO BNP: 32 PG/ML
P2Y12 PLT RESPONSE: 28 PRU (ref 194–418)
PCO2 BLDA: 31 MMHG (ref 35–45)
PH BLDA: 7.43 (ref 7.35–7.45)
PH UR STRIP: 5.5 (ref 5–8)
PLATELET # BLD AUTO: 237 K/UL (ref 150–400)
PMV BLD AUTO: 10.7 FL (ref 8.9–12.9)
PO2 BLDA: 84 MMHG (ref 80–100)
POTASSIUM SERPL-SCNC: 4.9 MMOL/L (ref 3.5–5.1)
PROT UR STRIP-MCNC: 30 MG/DL
PROTHROMBIN TIME: 11.6 SEC (ref 9.2–11.2)
RBC # BLD AUTO: 5.06 M/UL (ref 4.1–5.7)
RBC #/AREA URNS HPF: ABNORMAL /HPF (ref 0–5)
SAO2 % BLD: 97 % (ref 92–97)
SAO2% DEVICE SAO2% SENSOR NAME: ABNORMAL
SERVICE CMNT-IMP: ABNORMAL
SODIUM SERPL-SCNC: 131 MMOL/L (ref 136–145)
SP GR UR REFRACTOMETRY: 1.01 (ref 1–1.03)
SPECIMEN SITE: ABNORMAL
TSH SERPL DL<=0.05 MIU/L-ACNC: 0.84 UIU/ML (ref 0.36–3.74)
UFH PPP CHRO-ACNC: 0.11 IU/ML
UFH PPP CHRO-ACNC: 0.51 IU/ML
UFH PPP CHRO-ACNC: <0.1 IU/ML
URINE CULTURE IF INDICATED: ABNORMAL
UROBILINOGEN UR QL STRIP.AUTO: 0.2 EU/DL (ref 0.2–1)
VAS LEFT ABI: 0.91
VAS LEFT ARM BP: 144 MMHG
VAS LEFT CCA DIST EDV: 17.3 CM/S
VAS LEFT CCA DIST PSV: 127 CM/S
VAS LEFT CCA PROX EDV: 19.5 CM/S
VAS LEFT CCA PROX PSV: 127 CM/S
VAS LEFT DORSALIS PEDIS BP: 104 MMHG
VAS LEFT ECA EDV: 12.6 CM/S
VAS LEFT ECA PSV: 105.7 CM/S
VAS LEFT GSV ANKLE DIAM: 2.3 MM
VAS LEFT GSV AT KNEE DIAM: 2.6 MM
VAS LEFT GSV BK MID DIAM: 2 MM
VAS LEFT GSV BK PROX DIAM: 2.1 MM
VAS LEFT GSV THIGH DIST DIAM: 2.5 MM
VAS LEFT GSV THIGH MID DIAM: 2 MM
VAS LEFT GSV THIGH PROX DIAM: 3.2 MM
VAS LEFT ICA DIST EDV: 23.2 CM/S
VAS LEFT ICA DIST PSV: 62.9 CM/S
VAS LEFT ICA MID EDV: 27 CM/S
VAS LEFT ICA MID PSV: 60.1 CM/S
VAS LEFT ICA PROX EDV: 20.4 CM/S
VAS LEFT ICA PROX PSV: 41.3 CM/S
VAS LEFT ICA/CCA PSV: 0.5 NO UNITS
VAS LEFT PTA BP: 131 MMHG
VAS LEFT SUBCLAVIAN PROX EDV: 0 CM/S
VAS LEFT SUBCLAVIAN PROX PSV: 116.1 CM/S
VAS LEFT TBI: 0.85
VAS LEFT TOE PRESSURE: 122 MMHG
VAS LEFT VERTEBRAL EDV: 18 CM/S
VAS LEFT VERTEBRAL PSV: 53.7 CM/S
VAS RIGHT ABI: 0.78
VAS RIGHT CCA DIST EDV: 13.4 CM/S
VAS RIGHT CCA DIST PSV: 79.7 CM/S
VAS RIGHT CCA PROX EDV: 15.1 CM/S
VAS RIGHT CCA PROX PSV: 127.1 CM/S
VAS RIGHT DORSALIS PEDIS BP: 105 MMHG
VAS RIGHT ECA EDV: 10.1 CM/S
VAS RIGHT ECA PSV: 133 CM/S
VAS RIGHT GSV ANKLE DIAM: 1.4 MM
VAS RIGHT GSV AT KNEE DIAM: 2.6 MM
VAS RIGHT GSV BK MID DIAM: 1.6 MM
VAS RIGHT GSV BK PROX DIAM: 1.7 MM
VAS RIGHT GSV THIGH DIST DIAM: 2.2 MM
VAS RIGHT GSV THIGH MID DIAM: 2.2 MM
VAS RIGHT GSV THIGH PROX DIAM: 2.8 MM
VAS RIGHT ICA DIST EDV: 19.5 CM/S
VAS RIGHT ICA DIST PSV: 54.4 CM/S
VAS RIGHT ICA MID EDV: 20.4 CM/S
VAS RIGHT ICA MID PSV: 49.7 CM/S
VAS RIGHT ICA PROX EDV: 20.4 CM/S
VAS RIGHT ICA PROX PSV: 48.7 CM/S
VAS RIGHT ICA/CCA PSV: 0.7 NO UNITS
VAS RIGHT PTA BP: 113 MMHG
VAS RIGHT SUBCLAVIAN PROX EDV: 0 CM/S
VAS RIGHT SUBCLAVIAN PROX PSV: 89.3 CM/S
VAS RIGHT TBI: 0.78
VAS RIGHT TOE PRESSURE: 113 MMHG
VAS RIGHT VERTEBRAL EDV: 8.9 CM/S
VAS RIGHT VERTEBRAL PSV: 29 CM/S
WBC # BLD AUTO: 15.9 K/UL (ref 4.1–11.1)
WBC URNS QL MICRO: ABNORMAL /HPF (ref 0–4)

## 2025-04-14 PROCEDURE — 93458 L HRT ARTERY/VENTRICLE ANGIO: CPT | Performed by: INTERNAL MEDICINE

## 2025-04-14 PROCEDURE — 2500000003 HC RX 250 WO HCPCS: Performed by: INTERNAL MEDICINE

## 2025-04-14 PROCEDURE — 6370000000 HC RX 637 (ALT 250 FOR IP): Performed by: INTERNAL MEDICINE

## 2025-04-14 PROCEDURE — 6360000002 HC RX W HCPCS: Performed by: INTERNAL MEDICINE

## 2025-04-14 PROCEDURE — 83880 ASSAY OF NATRIURETIC PEPTIDE: CPT

## 2025-04-14 PROCEDURE — 99152 MOD SED SAME PHYS/QHP 5/>YRS: CPT | Performed by: INTERNAL MEDICINE

## 2025-04-14 PROCEDURE — 84443 ASSAY THYROID STIM HORMONE: CPT

## 2025-04-14 PROCEDURE — C1769 GUIDE WIRE: HCPCS | Performed by: INTERNAL MEDICINE

## 2025-04-14 PROCEDURE — 93922 UPR/L XTREMITY ART 2 LEVELS: CPT

## 2025-04-14 PROCEDURE — 94010 BREATHING CAPACITY TEST: CPT

## 2025-04-14 PROCEDURE — 81001 URINALYSIS AUTO W/SCOPE: CPT

## 2025-04-14 PROCEDURE — B2111ZZ FLUOROSCOPY OF MULTIPLE CORONARY ARTERIES USING LOW OSMOLAR CONTRAST: ICD-10-PCS | Performed by: INTERNAL MEDICINE

## 2025-04-14 PROCEDURE — 85610 PROTHROMBIN TIME: CPT

## 2025-04-14 PROCEDURE — 4A023N7 MEASUREMENT OF CARDIAC SAMPLING AND PRESSURE, LEFT HEART, PERCUTANEOUS APPROACH: ICD-10-PCS | Performed by: INTERNAL MEDICINE

## 2025-04-14 PROCEDURE — 83735 ASSAY OF MAGNESIUM: CPT

## 2025-04-14 PROCEDURE — 93005 ELECTROCARDIOGRAM TRACING: CPT | Performed by: STUDENT IN AN ORGANIZED HEALTH CARE EDUCATION/TRAINING PROGRAM

## 2025-04-14 PROCEDURE — 93880 EXTRACRANIAL BILAT STUDY: CPT

## 2025-04-14 PROCEDURE — 80048 BASIC METABOLIC PNL TOTAL CA: CPT

## 2025-04-14 PROCEDURE — 2709999900 HC NON-CHARGEABLE SUPPLY: Performed by: INTERNAL MEDICINE

## 2025-04-14 PROCEDURE — 85520 HEPARIN ASSAY: CPT

## 2025-04-14 PROCEDURE — 6360000004 HC RX CONTRAST MEDICATION: Performed by: INTERNAL MEDICINE

## 2025-04-14 PROCEDURE — 6370000000 HC RX 637 (ALT 250 FOR IP): Performed by: STUDENT IN AN ORGANIZED HEALTH CARE EDUCATION/TRAINING PROGRAM

## 2025-04-14 PROCEDURE — 99153 MOD SED SAME PHYS/QHP EA: CPT | Performed by: INTERNAL MEDICINE

## 2025-04-14 PROCEDURE — 2580000003 HC RX 258: Performed by: INTERNAL MEDICINE

## 2025-04-14 PROCEDURE — 82803 BLOOD GASES ANY COMBINATION: CPT

## 2025-04-14 PROCEDURE — 76937 US GUIDE VASCULAR ACCESS: CPT | Performed by: INTERNAL MEDICINE

## 2025-04-14 PROCEDURE — 6360000002 HC RX W HCPCS: Performed by: STUDENT IN AN ORGANIZED HEALTH CARE EDUCATION/TRAINING PROGRAM

## 2025-04-14 PROCEDURE — 93880 EXTRACRANIAL BILAT STUDY: CPT | Performed by: PSYCHIATRY & NEUROLOGY

## 2025-04-14 PROCEDURE — 85576 BLOOD PLATELET AGGREGATION: CPT

## 2025-04-14 PROCEDURE — 99223 1ST HOSP IP/OBS HIGH 75: CPT

## 2025-04-14 PROCEDURE — C1894 INTRO/SHEATH, NON-LASER: HCPCS | Performed by: INTERNAL MEDICINE

## 2025-04-14 PROCEDURE — 36415 COLL VENOUS BLD VENIPUNCTURE: CPT

## 2025-04-14 PROCEDURE — 82962 GLUCOSE BLOOD TEST: CPT

## 2025-04-14 PROCEDURE — 36600 WITHDRAWAL OF ARTERIAL BLOOD: CPT

## 2025-04-14 PROCEDURE — 2060000000 HC ICU INTERMEDIATE R&B

## 2025-04-14 PROCEDURE — 85027 COMPLETE CBC AUTOMATED: CPT

## 2025-04-14 PROCEDURE — 93010 ELECTROCARDIOGRAM REPORT: CPT | Performed by: INTERNAL MEDICINE

## 2025-04-14 PROCEDURE — 94729 DIFFUSING CAPACITY: CPT

## 2025-04-14 PROCEDURE — 2580000003 HC RX 258: Performed by: STUDENT IN AN ORGANIZED HEALTH CARE EDUCATION/TRAINING PROGRAM

## 2025-04-14 PROCEDURE — 93308 TTE F-UP OR LMTD: CPT

## 2025-04-14 PROCEDURE — 93970 EXTREMITY STUDY: CPT

## 2025-04-14 RX ORDER — LIDOCAINE HYDROCHLORIDE 10 MG/ML
INJECTION, SOLUTION INFILTRATION; PERINEURAL PRN
Status: DISCONTINUED | OUTPATIENT
Start: 2025-04-14 | End: 2025-04-14 | Stop reason: HOSPADM

## 2025-04-14 RX ORDER — HEPARIN SODIUM 10000 [USP'U]/100ML
5-30 INJECTION, SOLUTION INTRAVENOUS CONTINUOUS
Status: DISCONTINUED | OUTPATIENT
Start: 2025-04-14 | End: 2025-04-21

## 2025-04-14 RX ORDER — VERAPAMIL HYDROCHLORIDE 2.5 MG/ML
INJECTION, SOLUTION INTRAVENOUS PRN
Status: DISCONTINUED | OUTPATIENT
Start: 2025-04-14 | End: 2025-04-14 | Stop reason: HOSPADM

## 2025-04-14 RX ORDER — FENTANYL CITRATE 50 UG/ML
INJECTION, SOLUTION INTRAMUSCULAR; INTRAVENOUS PRN
Status: DISCONTINUED | OUTPATIENT
Start: 2025-04-14 | End: 2025-04-14 | Stop reason: HOSPADM

## 2025-04-14 RX ORDER — AMIODARONE HYDROCHLORIDE 200 MG/1
200 TABLET ORAL 2 TIMES DAILY
Status: DISCONTINUED | OUTPATIENT
Start: 2025-04-15 | End: 2025-04-18

## 2025-04-14 RX ORDER — HEPARIN SODIUM 1000 [USP'U]/ML
INJECTION, SOLUTION INTRAVENOUS; SUBCUTANEOUS PRN
Status: DISCONTINUED | OUTPATIENT
Start: 2025-04-14 | End: 2025-04-14 | Stop reason: HOSPADM

## 2025-04-14 RX ORDER — ACETAMINOPHEN 325 MG/1
650 TABLET ORAL EVERY 4 HOURS PRN
Status: DISCONTINUED | OUTPATIENT
Start: 2025-04-14 | End: 2025-04-21

## 2025-04-14 RX ORDER — HEPARIN SODIUM 10000 [USP'U]/ML
INJECTION, SOLUTION INTRAVENOUS; SUBCUTANEOUS PRN
Status: DISCONTINUED | OUTPATIENT
Start: 2025-04-14 | End: 2025-04-14 | Stop reason: HOSPADM

## 2025-04-14 RX ORDER — SODIUM CHLORIDE 9 MG/ML
INJECTION, SOLUTION INTRAVENOUS CONTINUOUS
Status: ACTIVE | OUTPATIENT
Start: 2025-04-14 | End: 2025-04-14

## 2025-04-14 RX ORDER — SODIUM CHLORIDE 0.9 % (FLUSH) 0.9 %
5-40 SYRINGE (ML) INJECTION EVERY 12 HOURS SCHEDULED
Status: DISCONTINUED | OUTPATIENT
Start: 2025-04-14 | End: 2025-04-21

## 2025-04-14 RX ORDER — IOPAMIDOL 755 MG/ML
INJECTION, SOLUTION INTRAVASCULAR PRN
Status: DISCONTINUED | OUTPATIENT
Start: 2025-04-14 | End: 2025-04-14 | Stop reason: HOSPADM

## 2025-04-14 RX ORDER — ASPIRIN 81 MG/1
TABLET, CHEWABLE ORAL PRN
Status: DISCONTINUED | OUTPATIENT
Start: 2025-04-14 | End: 2025-04-14 | Stop reason: HOSPADM

## 2025-04-14 RX ORDER — ONDANSETRON 2 MG/ML
4 INJECTION INTRAMUSCULAR; INTRAVENOUS EVERY 6 HOURS PRN
Status: DISCONTINUED | OUTPATIENT
Start: 2025-04-14 | End: 2025-04-14 | Stop reason: SDUPTHER

## 2025-04-14 RX ORDER — LORAZEPAM 0.5 MG/1
0.5 TABLET ORAL EVERY 4 HOURS PRN
Status: COMPLETED | OUTPATIENT
Start: 2025-04-14 | End: 2025-04-14

## 2025-04-14 RX ADMIN — INSULIN GLARGINE 30 UNITS: 100 INJECTION, SOLUTION SUBCUTANEOUS at 21:39

## 2025-04-14 RX ADMIN — METOPROLOL SUCCINATE 100 MG: 50 TABLET, EXTENDED RELEASE ORAL at 10:25

## 2025-04-14 RX ADMIN — NITROGLYCERIN 1 INCH: 20 OINTMENT TOPICAL at 11:15

## 2025-04-14 RX ADMIN — DOXYCYCLINE 100 MG: 100 INJECTION, POWDER, LYOPHILIZED, FOR SOLUTION INTRAVENOUS at 12:19

## 2025-04-14 RX ADMIN — SODIUM CHLORIDE, PRESERVATIVE FREE 10 ML: 5 INJECTION INTRAVENOUS at 21:41

## 2025-04-14 RX ADMIN — AMLODIPINE BESYLATE 5 MG: 5 TABLET ORAL at 10:25

## 2025-04-14 RX ADMIN — LORAZEPAM 0.5 MG: 0.5 TABLET ORAL at 18:14

## 2025-04-14 RX ADMIN — ATORVASTATIN CALCIUM 80 MG: 40 TABLET, FILM COATED ORAL at 10:25

## 2025-04-14 RX ADMIN — INSULIN LISPRO 6 UNITS: 100 INJECTION, SOLUTION INTRAVENOUS; SUBCUTANEOUS at 10:25

## 2025-04-14 RX ADMIN — SODIUM CHLORIDE: 0.9 INJECTION, SOLUTION INTRAVENOUS at 09:42

## 2025-04-14 RX ADMIN — NITROGLYCERIN 1 INCH: 20 OINTMENT TOPICAL at 06:27

## 2025-04-14 RX ADMIN — INSULIN LISPRO 6 UNITS: 100 INJECTION, SOLUTION INTRAVENOUS; SUBCUTANEOUS at 21:38

## 2025-04-14 RX ADMIN — NITROGLYCERIN 1 INCH: 20 OINTMENT TOPICAL at 00:12

## 2025-04-14 RX ADMIN — HEPARIN SODIUM AND DEXTROSE 9 UNITS/KG/HR: 10000; 5 INJECTION INTRAVENOUS at 16:11

## 2025-04-14 RX ADMIN — MELATONIN 3 MG: at 21:50

## 2025-04-14 RX ADMIN — INSULIN LISPRO 8 UNITS: 100 INJECTION, SOLUTION INTRAVENOUS; SUBCUTANEOUS at 17:32

## 2025-04-14 RX ADMIN — PREDNISONE 40 MG: 20 TABLET ORAL at 06:36

## 2025-04-14 RX ADMIN — NITROGLYCERIN 1 INCH: 20 OINTMENT TOPICAL at 17:32

## 2025-04-14 RX ADMIN — HEPARIN SODIUM AND DEXTROSE 13 UNITS/KG/HR: 10000; 5 INJECTION INTRAVENOUS at 03:12

## 2025-04-14 RX ADMIN — HEPARIN SODIUM 2000 UNITS: 1000 INJECTION INTRAVENOUS; SUBCUTANEOUS at 23:56

## 2025-04-14 ASSESSMENT — PAIN SCALES - GENERAL
PAINLEVEL_OUTOF10: 0

## 2025-04-14 ASSESSMENT — PAIN - FUNCTIONAL ASSESSMENT: PAIN_FUNCTIONAL_ASSESSMENT: ACTIVITIES ARE NOT PREVENTED

## 2025-04-14 NOTE — CARDIO/PULMONARY
Chart reviewed: Patient is 45 y.o. male admitted with Unstable angina (HCC) [I20.0]    S/P [facility] on 4/14/25. Per cath report, \"Diabetic with severe proximal LAD, diffuse mid LAD disease, severe ISR of RCA KATALINA stent, also with subtotalled D1, superior branch of OM1, and OM2 will evaluate for CABG.\".  LATOYA results pending.    Cardiac Rehab will continue to follow.     BESSIE JOHNSON RN

## 2025-04-15 ENCOUNTER — APPOINTMENT (OUTPATIENT)
Facility: HOSPITAL | Age: 45
DRG: 165 | End: 2025-04-15
Attending: INTERNAL MEDICINE
Payer: COMMERCIAL

## 2025-04-15 LAB
ANION GAP SERPL CALC-SCNC: 8 MMOL/L (ref 2–12)
BUN SERPL-MCNC: 13 MG/DL (ref 6–20)
BUN/CREAT SERPL: 19 (ref 12–20)
CALCIUM SERPL-MCNC: 8.3 MG/DL (ref 8.5–10.1)
CHLORIDE SERPL-SCNC: 105 MMOL/L (ref 97–108)
CO2 SERPL-SCNC: 20 MMOL/L (ref 21–32)
CREAT SERPL-MCNC: 0.68 MG/DL (ref 0.7–1.3)
ECHO BSA: 2.41 M2
ERYTHROCYTE [DISTWIDTH] IN BLOOD BY AUTOMATED COUNT: 14.6 % (ref 11.5–14.5)
GLUCOSE BLD STRIP.AUTO-MCNC: 227 MG/DL (ref 65–117)
GLUCOSE BLD STRIP.AUTO-MCNC: 275 MG/DL (ref 65–117)
GLUCOSE BLD STRIP.AUTO-MCNC: 322 MG/DL (ref 65–117)
GLUCOSE BLD STRIP.AUTO-MCNC: 371 MG/DL (ref 65–117)
GLUCOSE SERPL-MCNC: 255 MG/DL (ref 65–100)
HCT VFR BLD AUTO: 40.2 % (ref 36.6–50.3)
HGB BLD-MCNC: 13.6 G/DL (ref 12.1–17)
MCH RBC QN AUTO: 29.6 PG (ref 26–34)
MCHC RBC AUTO-ENTMCNC: 33.8 G/DL (ref 30–36.5)
MCV RBC AUTO: 87.4 FL (ref 80–99)
NRBC # BLD: 0 K/UL (ref 0–0.01)
NRBC BLD-RTO: 0 PER 100 WBC
P2Y12 PLT RESPONSE: 94 PRU (ref 194–418)
PLATELET # BLD AUTO: 235 K/UL (ref 150–400)
PMV BLD AUTO: 10.9 FL (ref 8.9–12.9)
POTASSIUM SERPL-SCNC: 3.7 MMOL/L (ref 3.5–5.1)
RBC # BLD AUTO: 4.6 M/UL (ref 4.1–5.7)
SERVICE CMNT-IMP: ABNORMAL
SODIUM SERPL-SCNC: 133 MMOL/L (ref 136–145)
UFH PPP CHRO-ACNC: 0.15 IU/ML
UFH PPP CHRO-ACNC: 0.3 IU/ML
UFH PPP CHRO-ACNC: 0.39 IU/ML
WBC # BLD AUTO: 24.3 K/UL (ref 4.1–11.1)

## 2025-04-15 PROCEDURE — 6360000002 HC RX W HCPCS: Performed by: INTERNAL MEDICINE

## 2025-04-15 PROCEDURE — 2580000003 HC RX 258: Performed by: STUDENT IN AN ORGANIZED HEALTH CARE EDUCATION/TRAINING PROGRAM

## 2025-04-15 PROCEDURE — 87040 BLOOD CULTURE FOR BACTERIA: CPT

## 2025-04-15 PROCEDURE — 85520 HEPARIN ASSAY: CPT

## 2025-04-15 PROCEDURE — 85027 COMPLETE CBC AUTOMATED: CPT

## 2025-04-15 PROCEDURE — 2060000000 HC ICU INTERMEDIATE R&B

## 2025-04-15 PROCEDURE — 36415 COLL VENOUS BLD VENIPUNCTURE: CPT

## 2025-04-15 PROCEDURE — 85576 BLOOD PLATELET AGGREGATION: CPT

## 2025-04-15 PROCEDURE — 99221 1ST HOSP IP/OBS SF/LOW 40: CPT | Performed by: INTERNAL MEDICINE

## 2025-04-15 PROCEDURE — 82962 GLUCOSE BLOOD TEST: CPT

## 2025-04-15 PROCEDURE — 70540 MRI ORBIT/FACE/NECK W/O DYE: CPT

## 2025-04-15 PROCEDURE — 6370000000 HC RX 637 (ALT 250 FOR IP)

## 2025-04-15 PROCEDURE — 6360000002 HC RX W HCPCS: Performed by: STUDENT IN AN ORGANIZED HEALTH CARE EDUCATION/TRAINING PROGRAM

## 2025-04-15 PROCEDURE — 6370000000 HC RX 637 (ALT 250 FOR IP): Performed by: STUDENT IN AN ORGANIZED HEALTH CARE EDUCATION/TRAINING PROGRAM

## 2025-04-15 PROCEDURE — 80048 BASIC METABOLIC PNL TOTAL CA: CPT

## 2025-04-15 PROCEDURE — 6370000000 HC RX 637 (ALT 250 FOR IP): Performed by: INTERNAL MEDICINE

## 2025-04-15 RX ORDER — INSULIN LISPRO 100 [IU]/ML
10 INJECTION, SOLUTION INTRAVENOUS; SUBCUTANEOUS
Status: DISCONTINUED | OUTPATIENT
Start: 2025-04-15 | End: 2025-04-16

## 2025-04-15 RX ORDER — NICOTINE 21 MG/24HR
1 PATCH, TRANSDERMAL 24 HOURS TRANSDERMAL DAILY
Status: DISCONTINUED | OUTPATIENT
Start: 2025-04-15 | End: 2025-04-21

## 2025-04-15 RX ORDER — INSULIN GLARGINE 100 [IU]/ML
40 INJECTION, SOLUTION SUBCUTANEOUS NIGHTLY
Status: DISCONTINUED | OUTPATIENT
Start: 2025-04-15 | End: 2025-04-16

## 2025-04-15 RX ORDER — NICOTINE 21 MG/24HR
1 PATCH, TRANSDERMAL 24 HOURS TRANSDERMAL DAILY
Status: DISCONTINUED | OUTPATIENT
Start: 2025-04-15 | End: 2025-04-15

## 2025-04-15 RX ORDER — IOPAMIDOL 755 MG/ML
INJECTION, SOLUTION INTRAVASCULAR PRN
Status: DISCONTINUED | OUTPATIENT
Start: 2025-04-14 | End: 2025-04-15 | Stop reason: HOSPADM

## 2025-04-15 RX ORDER — LORAZEPAM 0.5 MG/1
0.5 TABLET ORAL
Status: COMPLETED | OUTPATIENT
Start: 2025-04-15 | End: 2025-04-15

## 2025-04-15 RX ORDER — HEPARIN SODIUM 10000 [USP'U]/100ML
5-30 INJECTION, SOLUTION INTRAVENOUS CONTINUOUS
Status: CANCELLED | OUTPATIENT
Start: 2025-04-15

## 2025-04-15 RX ADMIN — INSULIN LISPRO 10 UNITS: 100 INJECTION, SOLUTION INTRAVENOUS; SUBCUTANEOUS at 18:23

## 2025-04-15 RX ADMIN — LORAZEPAM 0.5 MG: 0.5 TABLET ORAL at 22:25

## 2025-04-15 RX ADMIN — AMIODARONE HYDROCHLORIDE 200 MG: 200 TABLET ORAL at 08:23

## 2025-04-15 RX ADMIN — INSULIN LISPRO 2 UNITS: 100 INJECTION, SOLUTION INTRAVENOUS; SUBCUTANEOUS at 08:24

## 2025-04-15 RX ADMIN — HEPARIN SODIUM AND DEXTROSE 11 UNITS/KG/HR: 10000; 5 INJECTION INTRAVENOUS at 11:52

## 2025-04-15 RX ADMIN — AMLODIPINE BESYLATE 5 MG: 5 TABLET ORAL at 08:24

## 2025-04-15 RX ADMIN — METOPROLOL SUCCINATE 100 MG: 50 TABLET, EXTENDED RELEASE ORAL at 08:24

## 2025-04-15 RX ADMIN — DOXYCYCLINE 100 MG: 100 INJECTION, POWDER, LYOPHILIZED, FOR SOLUTION INTRAVENOUS at 00:04

## 2025-04-15 RX ADMIN — INSULIN LISPRO 4 UNITS: 100 INJECTION, SOLUTION INTRAVENOUS; SUBCUTANEOUS at 11:45

## 2025-04-15 RX ADMIN — NITROGLYCERIN 1 INCH: 20 OINTMENT TOPICAL at 23:53

## 2025-04-15 RX ADMIN — MELATONIN 3 MG: at 22:25

## 2025-04-15 RX ADMIN — NITROGLYCERIN 1 INCH: 20 OINTMENT TOPICAL at 11:45

## 2025-04-15 RX ADMIN — ASPIRIN 81 MG: 81 TABLET, CHEWABLE ORAL at 08:24

## 2025-04-15 RX ADMIN — NITROGLYCERIN 1 INCH: 20 OINTMENT TOPICAL at 06:21

## 2025-04-15 RX ADMIN — DOXYCYCLINE 100 MG: 100 INJECTION, POWDER, LYOPHILIZED, FOR SOLUTION INTRAVENOUS at 11:49

## 2025-04-15 RX ADMIN — DOXYCYCLINE 100 MG: 100 INJECTION, POWDER, LYOPHILIZED, FOR SOLUTION INTRAVENOUS at 23:51

## 2025-04-15 RX ADMIN — NITROGLYCERIN 1 INCH: 20 OINTMENT TOPICAL at 18:22

## 2025-04-15 RX ADMIN — INSULIN LISPRO 6 UNITS: 100 INJECTION, SOLUTION INTRAVENOUS; SUBCUTANEOUS at 20:46

## 2025-04-15 RX ADMIN — SODIUM CHLORIDE: 9 INJECTION, SOLUTION INTRAVENOUS at 00:03

## 2025-04-15 RX ADMIN — NITROGLYCERIN 1 INCH: 20 OINTMENT TOPICAL at 00:00

## 2025-04-15 RX ADMIN — HEPARIN SODIUM 2000 UNITS: 1000 INJECTION INTRAVENOUS; SUBCUTANEOUS at 14:26

## 2025-04-15 RX ADMIN — INSULIN GLARGINE 40 UNITS: 100 INJECTION, SOLUTION SUBCUTANEOUS at 20:46

## 2025-04-15 RX ADMIN — AMIODARONE HYDROCHLORIDE 200 MG: 200 TABLET ORAL at 20:46

## 2025-04-15 RX ADMIN — ATORVASTATIN CALCIUM 80 MG: 40 TABLET, FILM COATED ORAL at 08:24

## 2025-04-15 ASSESSMENT — PAIN SCALES - GENERAL: PAINLEVEL_OUTOF10: 0

## 2025-04-16 LAB
ANION GAP SERPL CALC-SCNC: 3 MMOL/L (ref 2–12)
BUN SERPL-MCNC: 11 MG/DL (ref 6–20)
BUN/CREAT SERPL: 14 (ref 12–20)
CALCIUM SERPL-MCNC: 8.2 MG/DL (ref 8.5–10.1)
CHLORIDE SERPL-SCNC: 107 MMOL/L (ref 97–108)
CO2 SERPL-SCNC: 23 MMOL/L (ref 21–32)
CREAT SERPL-MCNC: 0.79 MG/DL (ref 0.7–1.3)
EKG ATRIAL RATE: 77 BPM
EKG DIAGNOSIS: NORMAL
EKG P AXIS: 53 DEGREES
EKG P-R INTERVAL: 154 MS
EKG Q-T INTERVAL: 376 MS
EKG QRS DURATION: 94 MS
EKG QTC CALCULATION (BAZETT): 425 MS
EKG R AXIS: -18 DEGREES
EKG T AXIS: 41 DEGREES
EKG VENTRICULAR RATE: 77 BPM
ERYTHROCYTE [DISTWIDTH] IN BLOOD BY AUTOMATED COUNT: 14.6 % (ref 11.5–14.5)
GLUCOSE BLD STRIP.AUTO-MCNC: 125 MG/DL (ref 65–117)
GLUCOSE BLD STRIP.AUTO-MCNC: 126 MG/DL (ref 65–117)
GLUCOSE BLD STRIP.AUTO-MCNC: 132 MG/DL (ref 65–117)
GLUCOSE BLD STRIP.AUTO-MCNC: 146 MG/DL (ref 65–117)
GLUCOSE BLD STRIP.AUTO-MCNC: 304 MG/DL (ref 65–117)
GLUCOSE SERPL-MCNC: 321 MG/DL (ref 65–100)
HCT VFR BLD AUTO: 38.3 % (ref 36.6–50.3)
HGB BLD-MCNC: 12.5 G/DL (ref 12.1–17)
MCH RBC QN AUTO: 29.6 PG (ref 26–34)
MCHC RBC AUTO-ENTMCNC: 32.6 G/DL (ref 30–36.5)
MCV RBC AUTO: 90.8 FL (ref 80–99)
NRBC # BLD: 0 K/UL (ref 0–0.01)
NRBC BLD-RTO: 0 PER 100 WBC
PLATELET # BLD AUTO: 212 K/UL (ref 150–400)
PMV BLD AUTO: 11 FL (ref 8.9–12.9)
POTASSIUM SERPL-SCNC: 3.6 MMOL/L (ref 3.5–5.1)
RBC # BLD AUTO: 4.22 M/UL (ref 4.1–5.7)
SERVICE CMNT-IMP: ABNORMAL
SODIUM SERPL-SCNC: 133 MMOL/L (ref 136–145)
UFH PPP CHRO-ACNC: 0.36 IU/ML
WBC # BLD AUTO: 10.1 K/UL (ref 4.1–11.1)

## 2025-04-16 PROCEDURE — 82962 GLUCOSE BLOOD TEST: CPT

## 2025-04-16 PROCEDURE — 6370000000 HC RX 637 (ALT 250 FOR IP): Performed by: INTERNAL MEDICINE

## 2025-04-16 PROCEDURE — 85027 COMPLETE CBC AUTOMATED: CPT

## 2025-04-16 PROCEDURE — 6370000000 HC RX 637 (ALT 250 FOR IP)

## 2025-04-16 PROCEDURE — 2580000003 HC RX 258: Performed by: STUDENT IN AN ORGANIZED HEALTH CARE EDUCATION/TRAINING PROGRAM

## 2025-04-16 PROCEDURE — 6360000002 HC RX W HCPCS: Performed by: STUDENT IN AN ORGANIZED HEALTH CARE EDUCATION/TRAINING PROGRAM

## 2025-04-16 PROCEDURE — 6370000000 HC RX 637 (ALT 250 FOR IP): Performed by: STUDENT IN AN ORGANIZED HEALTH CARE EDUCATION/TRAINING PROGRAM

## 2025-04-16 PROCEDURE — 36415 COLL VENOUS BLD VENIPUNCTURE: CPT

## 2025-04-16 PROCEDURE — 6360000002 HC RX W HCPCS: Performed by: INTERNAL MEDICINE

## 2025-04-16 PROCEDURE — 80048 BASIC METABOLIC PNL TOTAL CA: CPT

## 2025-04-16 PROCEDURE — 2060000000 HC ICU INTERMEDIATE R&B

## 2025-04-16 PROCEDURE — 85520 HEPARIN ASSAY: CPT

## 2025-04-16 RX ORDER — DOCUSATE SODIUM 100 MG/1
100 CAPSULE, LIQUID FILLED ORAL 2 TIMES DAILY
Status: DISCONTINUED | OUTPATIENT
Start: 2025-04-16 | End: 2025-04-21

## 2025-04-16 RX ORDER — INSULIN GLARGINE 100 [IU]/ML
20 INJECTION, SOLUTION SUBCUTANEOUS
Status: COMPLETED | OUTPATIENT
Start: 2025-04-16 | End: 2025-04-16

## 2025-04-16 RX ORDER — INSULIN GLARGINE 100 [IU]/ML
60 INJECTION, SOLUTION SUBCUTANEOUS NIGHTLY
Status: DISCONTINUED | OUTPATIENT
Start: 2025-04-16 | End: 2025-04-17

## 2025-04-16 RX ORDER — LORAZEPAM 0.5 MG/1
0.5 TABLET ORAL
Status: COMPLETED | OUTPATIENT
Start: 2025-04-16 | End: 2025-04-16

## 2025-04-16 RX ORDER — INSULIN GLARGINE 100 [IU]/ML
20 INJECTION, SOLUTION SUBCUTANEOUS ONCE
Status: COMPLETED | OUTPATIENT
Start: 2025-04-16 | End: 2025-04-16

## 2025-04-16 RX ORDER — INSULIN LISPRO 100 [IU]/ML
15 INJECTION, SOLUTION INTRAVENOUS; SUBCUTANEOUS
Status: DISCONTINUED | OUTPATIENT
Start: 2025-04-16 | End: 2025-04-20

## 2025-04-16 RX ADMIN — AMLODIPINE BESYLATE 5 MG: 5 TABLET ORAL at 08:25

## 2025-04-16 RX ADMIN — DOCUSATE SODIUM 100 MG: 100 CAPSULE, LIQUID FILLED ORAL at 08:25

## 2025-04-16 RX ADMIN — ATORVASTATIN CALCIUM 80 MG: 40 TABLET, FILM COATED ORAL at 08:25

## 2025-04-16 RX ADMIN — NITROGLYCERIN 1 INCH: 20 OINTMENT TOPICAL at 23:30

## 2025-04-16 RX ADMIN — HEPARIN SODIUM AND DEXTROSE 13 UNITS/KG/HR: 10000; 5 INJECTION INTRAVENOUS at 09:27

## 2025-04-16 RX ADMIN — AMIODARONE HYDROCHLORIDE 200 MG: 200 TABLET ORAL at 20:08

## 2025-04-16 RX ADMIN — INSULIN LISPRO 15 UNITS: 100 INJECTION, SOLUTION INTRAVENOUS; SUBCUTANEOUS at 17:23

## 2025-04-16 RX ADMIN — DOXYCYCLINE 100 MG: 100 INJECTION, POWDER, LYOPHILIZED, FOR SOLUTION INTRAVENOUS at 12:26

## 2025-04-16 RX ADMIN — NITROGLYCERIN 1 INCH: 20 OINTMENT TOPICAL at 17:23

## 2025-04-16 RX ADMIN — ASPIRIN 81 MG: 81 TABLET, CHEWABLE ORAL at 08:25

## 2025-04-16 RX ADMIN — INSULIN LISPRO 6 UNITS: 100 INJECTION, SOLUTION INTRAVENOUS; SUBCUTANEOUS at 08:25

## 2025-04-16 RX ADMIN — INSULIN LISPRO 15 UNITS: 100 INJECTION, SOLUTION INTRAVENOUS; SUBCUTANEOUS at 12:22

## 2025-04-16 RX ADMIN — NITROGLYCERIN 1 INCH: 20 OINTMENT TOPICAL at 04:31

## 2025-04-16 RX ADMIN — MELATONIN 3 MG: at 23:31

## 2025-04-16 RX ADMIN — DOCUSATE SODIUM 100 MG: 100 CAPSULE, LIQUID FILLED ORAL at 20:08

## 2025-04-16 RX ADMIN — NITROGLYCERIN 1 INCH: 20 OINTMENT TOPICAL at 12:22

## 2025-04-16 RX ADMIN — DOXYCYCLINE 100 MG: 100 INJECTION, POWDER, LYOPHILIZED, FOR SOLUTION INTRAVENOUS at 23:32

## 2025-04-16 RX ADMIN — INSULIN GLARGINE 20 UNITS: 100 INJECTION, SOLUTION SUBCUTANEOUS at 08:26

## 2025-04-16 RX ADMIN — INSULIN LISPRO 15 UNITS: 100 INJECTION, SOLUTION INTRAVENOUS; SUBCUTANEOUS at 08:25

## 2025-04-16 RX ADMIN — METOPROLOL SUCCINATE 100 MG: 50 TABLET, EXTENDED RELEASE ORAL at 08:25

## 2025-04-16 RX ADMIN — LORAZEPAM 0.5 MG: 0.5 TABLET ORAL at 23:49

## 2025-04-16 RX ADMIN — INSULIN GLARGINE 20 UNITS: 100 INJECTION, SOLUTION SUBCUTANEOUS at 20:08

## 2025-04-16 RX ADMIN — AMIODARONE HYDROCHLORIDE 200 MG: 200 TABLET ORAL at 08:24

## 2025-04-16 ASSESSMENT — PAIN SCALES - GENERAL
PAINLEVEL_OUTOF10: 0
PAINLEVEL_OUTOF10: 0

## 2025-04-16 NOTE — PROCEDURES
Pulmonary Function Test        PATIENT ID: Michael Fournier  MRN: [id number]   YOB: 1980    DATE OF ADMISSION: [id number]    PRIMARY CARE PROVIDER: Marbella Perkins APRN - NP       Spirometry:  Spirometry is normal.    Bronchodilator response:  Bronchodilator test not performed.    Volumes:  Lung volumes not performed.    Diffusion:  Diffusing capacity is normal.    Flow-Volume:  The flow-volume loop is normal.      Signed:   Adria Bocanegra MD  4/16/2025  3:17 PM

## 2025-04-17 ENCOUNTER — HOSPITAL ENCOUNTER (INPATIENT)
Facility: HOSPITAL | Age: 45
Discharge: HOME | DRG: 165 | End: 2025-04-20
Attending: STUDENT IN AN ORGANIZED HEALTH CARE EDUCATION/TRAINING PROGRAM
Payer: COMMERCIAL

## 2025-04-17 VITALS
OXYGEN SATURATION: 96 % | SYSTOLIC BLOOD PRESSURE: 138 MMHG | HEART RATE: 77 BPM | DIASTOLIC BLOOD PRESSURE: 84 MMHG | RESPIRATION RATE: 20 BRPM

## 2025-04-17 PROBLEM — Z79.4 TYPE 2 DIABETES MELLITUS WITH HYPERGLYCEMIA, WITH LONG-TERM CURRENT USE OF INSULIN (HCC): Status: ACTIVE | Noted: 2025-04-17

## 2025-04-17 PROBLEM — E11.65 TYPE 2 DIABETES MELLITUS WITH HYPERGLYCEMIA, WITH LONG-TERM CURRENT USE OF INSULIN (HCC): Status: ACTIVE | Noted: 2025-04-17

## 2025-04-17 LAB
ANION GAP SERPL CALC-SCNC: 6 MMOL/L (ref 2–12)
BASOPHILS # BLD: 0.05 K/UL (ref 0–0.1)
BASOPHILS NFR BLD: 0.6 % (ref 0–1)
BUN SERPL-MCNC: 7 MG/DL (ref 6–20)
BUN/CREAT SERPL: 11 (ref 12–20)
CALCIUM SERPL-MCNC: 8.4 MG/DL (ref 8.5–10.1)
CHLORIDE SERPL-SCNC: 108 MMOL/L (ref 97–108)
CO2 SERPL-SCNC: 22 MMOL/L (ref 21–32)
CREAT SERPL-MCNC: 0.64 MG/DL (ref 0.7–1.3)
DIFFERENTIAL METHOD BLD: NORMAL
EOSINOPHIL # BLD: 0.25 K/UL (ref 0–0.4)
EOSINOPHIL NFR BLD: 2.8 % (ref 0–7)
ERYTHROCYTE [DISTWIDTH] IN BLOOD BY AUTOMATED COUNT: 14.5 % (ref 11.5–14.5)
GLUCOSE BLD STRIP.AUTO-MCNC: 118 MG/DL (ref 65–117)
GLUCOSE BLD STRIP.AUTO-MCNC: 169 MG/DL (ref 65–117)
GLUCOSE BLD STRIP.AUTO-MCNC: 278 MG/DL (ref 65–117)
GLUCOSE BLD STRIP.AUTO-MCNC: 95 MG/DL (ref 65–117)
GLUCOSE SERPL-MCNC: 168 MG/DL (ref 65–100)
HCT VFR BLD AUTO: 37.7 % (ref 36.6–50.3)
HGB BLD-MCNC: 12.8 G/DL (ref 12.1–17)
IMM GRANULOCYTES # BLD AUTO: 0.04 K/UL (ref 0–0.04)
IMM GRANULOCYTES NFR BLD AUTO: 0.4 % (ref 0–0.5)
LYMPHOCYTES # BLD: 2.71 K/UL (ref 0.8–3.5)
LYMPHOCYTES NFR BLD: 30.4 % (ref 12–49)
MAGNESIUM SERPL-MCNC: 1.7 MG/DL (ref 1.6–2.4)
MCH RBC QN AUTO: 30.1 PG (ref 26–34)
MCHC RBC AUTO-ENTMCNC: 34 G/DL (ref 30–36.5)
MCV RBC AUTO: 88.7 FL (ref 80–99)
MONOCYTES # BLD: 0.72 K/UL (ref 0–1)
MONOCYTES NFR BLD: 8.1 % (ref 5–13)
NEUTS SEG # BLD: 5.15 K/UL (ref 1.8–8)
NEUTS SEG NFR BLD: 57.7 % (ref 32–75)
NRBC # BLD: 0 K/UL (ref 0–0.01)
NRBC BLD-RTO: 0 PER 100 WBC
PHOSPHATE SERPL-MCNC: 3 MG/DL (ref 2.6–4.7)
PLATELET # BLD AUTO: 204 K/UL (ref 150–400)
PMV BLD AUTO: 11.1 FL (ref 8.9–12.9)
POTASSIUM SERPL-SCNC: 3.3 MMOL/L (ref 3.5–5.1)
RBC # BLD AUTO: 4.25 M/UL (ref 4.1–5.7)
SERVICE CMNT-IMP: ABNORMAL
SERVICE CMNT-IMP: NORMAL
SODIUM SERPL-SCNC: 136 MMOL/L (ref 136–145)
UFH PPP CHRO-ACNC: 0.35 IU/ML
WBC # BLD AUTO: 8.9 K/UL (ref 4.1–11.1)

## 2025-04-17 PROCEDURE — 6360000002 HC RX W HCPCS: Performed by: INTERNAL MEDICINE

## 2025-04-17 PROCEDURE — 2580000003 HC RX 258: Performed by: STUDENT IN AN ORGANIZED HEALTH CARE EDUCATION/TRAINING PROGRAM

## 2025-04-17 PROCEDURE — 6360000002 HC RX W HCPCS: Performed by: STUDENT IN AN ORGANIZED HEALTH CARE EDUCATION/TRAINING PROGRAM

## 2025-04-17 PROCEDURE — 6370000000 HC RX 637 (ALT 250 FOR IP)

## 2025-04-17 PROCEDURE — 87186 SC STD MICRODIL/AGAR DIL: CPT

## 2025-04-17 PROCEDURE — 87205 SMEAR GRAM STAIN: CPT

## 2025-04-17 PROCEDURE — 80048 BASIC METABOLIC PNL TOTAL CA: CPT

## 2025-04-17 PROCEDURE — 6370000000 HC RX 637 (ALT 250 FOR IP): Performed by: STUDENT IN AN ORGANIZED HEALTH CARE EDUCATION/TRAINING PROGRAM

## 2025-04-17 PROCEDURE — 6370000000 HC RX 637 (ALT 250 FOR IP): Performed by: INTERNAL MEDICINE

## 2025-04-17 PROCEDURE — 84100 ASSAY OF PHOSPHORUS: CPT

## 2025-04-17 PROCEDURE — 88173 CYTOPATH EVAL FNA REPORT: CPT

## 2025-04-17 PROCEDURE — 2060000000 HC ICU INTERMEDIATE R&B

## 2025-04-17 PROCEDURE — 0J953ZX DRAINAGE OF LEFT NECK SUBCUTANEOUS TISSUE AND FASCIA, PERCUTANEOUS APPROACH, DIAGNOSTIC: ICD-10-PCS | Performed by: ANESTHESIOLOGY

## 2025-04-17 PROCEDURE — 83735 ASSAY OF MAGNESIUM: CPT

## 2025-04-17 PROCEDURE — 82962 GLUCOSE BLOOD TEST: CPT

## 2025-04-17 PROCEDURE — 87116 MYCOBACTERIA CULTURE: CPT

## 2025-04-17 PROCEDURE — 2500000003 HC RX 250 WO HCPCS: Performed by: INTERNAL MEDICINE

## 2025-04-17 PROCEDURE — 87070 CULTURE OTHR SPECIMN AEROBIC: CPT

## 2025-04-17 PROCEDURE — 10005 FNA BX W/US GDN 1ST LES: CPT

## 2025-04-17 PROCEDURE — 99231 SBSQ HOSP IP/OBS SF/LOW 25: CPT | Performed by: INTERNAL MEDICINE

## 2025-04-17 PROCEDURE — 2500000003 HC RX 250 WO HCPCS: Performed by: STUDENT IN AN ORGANIZED HEALTH CARE EDUCATION/TRAINING PROGRAM

## 2025-04-17 PROCEDURE — 36415 COLL VENOUS BLD VENIPUNCTURE: CPT

## 2025-04-17 PROCEDURE — 87102 FUNGUS ISOLATION CULTURE: CPT

## 2025-04-17 PROCEDURE — 85520 HEPARIN ASSAY: CPT

## 2025-04-17 PROCEDURE — 85025 COMPLETE CBC W/AUTO DIFF WBC: CPT

## 2025-04-17 PROCEDURE — 88172 CYTP DX EVAL FNA 1ST EA SITE: CPT

## 2025-04-17 PROCEDURE — 87077 CULTURE AEROBIC IDENTIFY: CPT

## 2025-04-17 PROCEDURE — 87206 SMEAR FLUORESCENT/ACID STAI: CPT

## 2025-04-17 RX ORDER — INSULIN GLARGINE 100 [IU]/ML
40 INJECTION, SOLUTION SUBCUTANEOUS EVERY MORNING
Status: DISCONTINUED | OUTPATIENT
Start: 2025-04-17 | End: 2025-04-18

## 2025-04-17 RX ORDER — POTASSIUM CHLORIDE 750 MG/1
40 TABLET, EXTENDED RELEASE ORAL ONCE
Status: COMPLETED | OUTPATIENT
Start: 2025-04-17 | End: 2025-04-17

## 2025-04-17 RX ADMIN — AMLODIPINE BESYLATE 5 MG: 5 TABLET ORAL at 08:54

## 2025-04-17 RX ADMIN — INSULIN LISPRO 4 UNITS: 100 INJECTION, SOLUTION INTRAVENOUS; SUBCUTANEOUS at 21:15

## 2025-04-17 RX ADMIN — DOCUSATE SODIUM 100 MG: 100 CAPSULE, LIQUID FILLED ORAL at 21:15

## 2025-04-17 RX ADMIN — SODIUM CHLORIDE, PRESERVATIVE FREE 10 ML: 5 INJECTION INTRAVENOUS at 08:56

## 2025-04-17 RX ADMIN — NITROGLYCERIN 1 INCH: 20 OINTMENT TOPICAL at 17:25

## 2025-04-17 RX ADMIN — DOCUSATE SODIUM 100 MG: 100 CAPSULE, LIQUID FILLED ORAL at 08:54

## 2025-04-17 RX ADMIN — NITROGLYCERIN 1 INCH: 20 OINTMENT TOPICAL at 06:22

## 2025-04-17 RX ADMIN — MELATONIN 3 MG: at 23:18

## 2025-04-17 RX ADMIN — DOXYCYCLINE 100 MG: 100 INJECTION, POWDER, LYOPHILIZED, FOR SOLUTION INTRAVENOUS at 12:30

## 2025-04-17 RX ADMIN — SODIUM CHLORIDE, PRESERVATIVE FREE 10 ML: 5 INJECTION INTRAVENOUS at 08:55

## 2025-04-17 RX ADMIN — HEPARIN SODIUM AND DEXTROSE 13 UNITS/KG/HR: 10000; 5 INJECTION INTRAVENOUS at 21:14

## 2025-04-17 RX ADMIN — INSULIN LISPRO 15 UNITS: 100 INJECTION, SOLUTION INTRAVENOUS; SUBCUTANEOUS at 12:42

## 2025-04-17 RX ADMIN — ASPIRIN 81 MG: 81 TABLET, CHEWABLE ORAL at 08:53

## 2025-04-17 RX ADMIN — NITROGLYCERIN 1 INCH: 20 OINTMENT TOPICAL at 23:18

## 2025-04-17 RX ADMIN — AMIODARONE HYDROCHLORIDE 200 MG: 200 TABLET ORAL at 08:54

## 2025-04-17 RX ADMIN — SODIUM CHLORIDE, PRESERVATIVE FREE 10 ML: 5 INJECTION INTRAVENOUS at 21:18

## 2025-04-17 RX ADMIN — INSULIN LISPRO 15 UNITS: 100 INJECTION, SOLUTION INTRAVENOUS; SUBCUTANEOUS at 08:54

## 2025-04-17 RX ADMIN — AMIODARONE HYDROCHLORIDE 200 MG: 200 TABLET ORAL at 21:16

## 2025-04-17 RX ADMIN — HEPARIN SODIUM AND DEXTROSE 13 UNITS/KG/HR: 10000; 5 INJECTION INTRAVENOUS at 03:19

## 2025-04-17 RX ADMIN — POTASSIUM CHLORIDE 40 MEQ: 750 TABLET, FILM COATED, EXTENDED RELEASE ORAL at 08:53

## 2025-04-17 RX ADMIN — INSULIN GLARGINE 40 UNITS: 100 INJECTION, SOLUTION SUBCUTANEOUS at 08:54

## 2025-04-17 RX ADMIN — METOPROLOL SUCCINATE 100 MG: 50 TABLET, EXTENDED RELEASE ORAL at 08:54

## 2025-04-17 RX ADMIN — ATORVASTATIN CALCIUM 80 MG: 40 TABLET, FILM COATED ORAL at 08:53

## 2025-04-17 RX ADMIN — DOXYCYCLINE 100 MG: 100 INJECTION, POWDER, LYOPHILIZED, FOR SOLUTION INTRAVENOUS at 23:20

## 2025-04-17 RX ADMIN — NITROGLYCERIN 1 INCH: 20 OINTMENT TOPICAL at 12:27

## 2025-04-17 ASSESSMENT — PAIN - FUNCTIONAL ASSESSMENT
PAIN_FUNCTIONAL_ASSESSMENT: NONE - DENIES PAIN

## 2025-04-17 NOTE — DIABETES MGMT
This is a 45-year-old male with a history of type 2 diabetes mellitus, coronary artery disease status post MI x 3 with multiple PCI, and hypertension who presented to [facility] on 4/11/2025 with complaints of substernal chest pain.  Admission labs notable for glucose 245, A1c 10.3%, creatinine 0.92, bicarb 24, normal troponins.  He was transferred to [facility] where he underwent repeat cardiac catheterization revealing multivessel disease.  He did receive multiple doses of prednisone for dye allergy.  He is now admitted in anticipation of CABG on Monday.  We are asked to assist in glucose management.     Patient tells me that he has had diabetes for 10 years and for the most part has been on Lantus and Humalog.  [facility] and their most recent note indicates a regimen of 50 units of Lantus, Humalog 4 units with meals, Farxiga 5 mg and Trulicity 0.75 mg weekly.  He claims adherence to the medical regimen.  He tells me today that he previously drank a lot of sodas but within the last month has switched to 0 sugar sodas.  He lives with his mother who also has diabetes.    Over the last several days, we have been adjusting his long-acting insulin with good result.  He is currently receiving 40 units of glargine once daily and 15 units of mealtime insulin.    Examination  Blood pressure 125/78  Pulse 67  Afebrile  99% on room air  Weight 110 kg    Recent laboratory data  Glucose 169 (range 125-169)  Creatinine 0.64  Bicarb 22    Impression  1.  Type 2 diabetes mellitus with improving glucose control with increasing insulin  2.  Coronary artery disease scheduled for CABG on Monday    Rest per team    Before making these care recommendations, I personally reviewed the hospitalization record, including notes, laboratory & diagnostic data and current medications, and examined the patient at the bedside. Total time  25   minutes,.      Please note that this

## 2025-04-18 ENCOUNTER — APPOINTMENT (OUTPATIENT)
Facility: HOSPITAL | Age: 45
DRG: 165 | End: 2025-04-18
Attending: INTERNAL MEDICINE
Payer: COMMERCIAL

## 2025-04-18 LAB
ANION GAP SERPL CALC-SCNC: 5 MMOL/L (ref 2–12)
BUN SERPL-MCNC: 5 MG/DL (ref 6–20)
BUN/CREAT SERPL: 7 (ref 12–20)
CALCIUM SERPL-MCNC: 8.9 MG/DL (ref 8.5–10.1)
CHLORIDE SERPL-SCNC: 106 MMOL/L (ref 97–108)
CO2 SERPL-SCNC: 24 MMOL/L (ref 21–32)
CREAT SERPL-MCNC: 0.69 MG/DL (ref 0.7–1.3)
ERYTHROCYTE [DISTWIDTH] IN BLOOD BY AUTOMATED COUNT: 14.5 % (ref 11.5–14.5)
GLUCOSE BLD STRIP.AUTO-MCNC: 104 MG/DL (ref 65–117)
GLUCOSE BLD STRIP.AUTO-MCNC: 148 MG/DL (ref 65–117)
GLUCOSE BLD STRIP.AUTO-MCNC: 188 MG/DL (ref 65–117)
GLUCOSE BLD STRIP.AUTO-MCNC: 243 MG/DL (ref 65–117)
GLUCOSE SERPL-MCNC: 203 MG/DL (ref 65–100)
HCT VFR BLD AUTO: 39.9 % (ref 36.6–50.3)
HGB BLD-MCNC: 13.5 G/DL (ref 12.1–17)
MAGNESIUM SERPL-MCNC: 1.7 MG/DL (ref 1.6–2.4)
MCH RBC QN AUTO: 29.9 PG (ref 26–34)
MCHC RBC AUTO-ENTMCNC: 33.8 G/DL (ref 30–36.5)
MCV RBC AUTO: 88.5 FL (ref 80–99)
NRBC # BLD: 0 K/UL (ref 0–0.01)
NRBC BLD-RTO: 0 PER 100 WBC
P2Y12 PLT RESPONSE: 217 PRU (ref 194–418)
PHOSPHATE SERPL-MCNC: 3 MG/DL (ref 2.6–4.7)
PLATELET # BLD AUTO: 205 K/UL (ref 150–400)
PMV BLD AUTO: 10.8 FL (ref 8.9–12.9)
POTASSIUM SERPL-SCNC: 3.6 MMOL/L (ref 3.5–5.1)
RBC # BLD AUTO: 4.51 M/UL (ref 4.1–5.7)
SERVICE CMNT-IMP: ABNORMAL
SERVICE CMNT-IMP: NORMAL
SODIUM SERPL-SCNC: 135 MMOL/L (ref 136–145)
UFH PPP CHRO-ACNC: 0.5 IU/ML
WBC # BLD AUTO: 9.5 K/UL (ref 4.1–11.1)

## 2025-04-18 PROCEDURE — 84100 ASSAY OF PHOSPHORUS: CPT

## 2025-04-18 PROCEDURE — 80048 BASIC METABOLIC PNL TOTAL CA: CPT

## 2025-04-18 PROCEDURE — 6370000000 HC RX 637 (ALT 250 FOR IP)

## 2025-04-18 PROCEDURE — 71046 X-RAY EXAM CHEST 2 VIEWS: CPT

## 2025-04-18 PROCEDURE — 36415 COLL VENOUS BLD VENIPUNCTURE: CPT

## 2025-04-18 PROCEDURE — 2580000003 HC RX 258: Performed by: STUDENT IN AN ORGANIZED HEALTH CARE EDUCATION/TRAINING PROGRAM

## 2025-04-18 PROCEDURE — 85027 COMPLETE CBC AUTOMATED: CPT

## 2025-04-18 PROCEDURE — 6370000000 HC RX 637 (ALT 250 FOR IP): Performed by: INTERNAL MEDICINE

## 2025-04-18 PROCEDURE — 82962 GLUCOSE BLOOD TEST: CPT

## 2025-04-18 PROCEDURE — 99231 SBSQ HOSP IP/OBS SF/LOW 25: CPT

## 2025-04-18 PROCEDURE — 83735 ASSAY OF MAGNESIUM: CPT

## 2025-04-18 PROCEDURE — 85576 BLOOD PLATELET AGGREGATION: CPT

## 2025-04-18 PROCEDURE — 6370000000 HC RX 637 (ALT 250 FOR IP): Performed by: PHYSICIAN ASSISTANT

## 2025-04-18 PROCEDURE — 6360000002 HC RX W HCPCS: Performed by: INTERNAL MEDICINE

## 2025-04-18 PROCEDURE — 2060000000 HC ICU INTERMEDIATE R&B

## 2025-04-18 PROCEDURE — 6360000002 HC RX W HCPCS: Performed by: STUDENT IN AN ORGANIZED HEALTH CARE EDUCATION/TRAINING PROGRAM

## 2025-04-18 PROCEDURE — 2500000003 HC RX 250 WO HCPCS: Performed by: INTERNAL MEDICINE

## 2025-04-18 PROCEDURE — 6370000000 HC RX 637 (ALT 250 FOR IP): Performed by: STUDENT IN AN ORGANIZED HEALTH CARE EDUCATION/TRAINING PROGRAM

## 2025-04-18 PROCEDURE — 85520 HEPARIN ASSAY: CPT

## 2025-04-18 PROCEDURE — 2500000003 HC RX 250 WO HCPCS: Performed by: STUDENT IN AN ORGANIZED HEALTH CARE EDUCATION/TRAINING PROGRAM

## 2025-04-18 RX ORDER — GLUCAGON 1 MG/ML
1 KIT INJECTION PRN
Status: DISCONTINUED | OUTPATIENT
Start: 2025-04-18 | End: 2025-04-21

## 2025-04-18 RX ORDER — LORAZEPAM 1 MG/1
1 TABLET ORAL ONCE
Status: COMPLETED | OUTPATIENT
Start: 2025-04-18 | End: 2025-04-18

## 2025-04-18 RX ORDER — CHLORHEXIDINE GLUCONATE ORAL RINSE 1.2 MG/ML
15 SOLUTION DENTAL 2 TIMES DAILY
Status: DISCONTINUED | OUTPATIENT
Start: 2025-04-18 | End: 2025-04-21

## 2025-04-18 RX ORDER — SODIUM CHLORIDE 0.9 % (FLUSH) 0.9 %
5-40 SYRINGE (ML) INJECTION PRN
Status: DISCONTINUED | OUTPATIENT
Start: 2025-04-18 | End: 2025-04-21

## 2025-04-18 RX ORDER — AMIODARONE HYDROCHLORIDE 200 MG/1
400 TABLET ORAL 2 TIMES DAILY
Status: DISCONTINUED | OUTPATIENT
Start: 2025-04-18 | End: 2025-04-21

## 2025-04-18 RX ORDER — INSULIN GLARGINE 100 [IU]/ML
44 INJECTION, SOLUTION SUBCUTANEOUS EVERY MORNING
Status: COMPLETED | OUTPATIENT
Start: 2025-04-19 | End: 2025-04-19

## 2025-04-18 RX ORDER — DEXTROSE MONOHYDRATE 100 MG/ML
INJECTION, SOLUTION INTRAVENOUS CONTINUOUS PRN
Status: DISCONTINUED | OUTPATIENT
Start: 2025-04-18 | End: 2025-04-21

## 2025-04-18 RX ORDER — MUPIROCIN 20 MG/G
OINTMENT TOPICAL 2 TIMES DAILY
Status: DISCONTINUED | OUTPATIENT
Start: 2025-04-18 | End: 2025-04-21

## 2025-04-18 RX ORDER — SODIUM CHLORIDE 0.9 % (FLUSH) 0.9 %
5-40 SYRINGE (ML) INJECTION EVERY 8 HOURS
Status: DISCONTINUED | OUTPATIENT
Start: 2025-04-18 | End: 2025-04-21

## 2025-04-18 RX ADMIN — INSULIN GLARGINE 40 UNITS: 100 INJECTION, SOLUTION SUBCUTANEOUS at 09:12

## 2025-04-18 RX ADMIN — LORAZEPAM 1 MG: 1 TABLET ORAL at 22:48

## 2025-04-18 RX ADMIN — DOXYCYCLINE 100 MG: 100 INJECTION, POWDER, LYOPHILIZED, FOR SOLUTION INTRAVENOUS at 11:42

## 2025-04-18 RX ADMIN — MELATONIN 3 MG: at 22:47

## 2025-04-18 RX ADMIN — MUPIROCIN: 20 OINTMENT TOPICAL at 22:43

## 2025-04-18 RX ADMIN — SODIUM CHLORIDE, PRESERVATIVE FREE 10 ML: 5 INJECTION INTRAVENOUS at 09:29

## 2025-04-18 RX ADMIN — NITROGLYCERIN 1 INCH: 20 OINTMENT TOPICAL at 11:36

## 2025-04-18 RX ADMIN — INSULIN LISPRO 15 UNITS: 100 INJECTION, SOLUTION INTRAVENOUS; SUBCUTANEOUS at 13:37

## 2025-04-18 RX ADMIN — INSULIN LISPRO 2 UNITS: 100 INJECTION, SOLUTION INTRAVENOUS; SUBCUTANEOUS at 13:37

## 2025-04-18 RX ADMIN — DOCUSATE SODIUM 100 MG: 100 CAPSULE, LIQUID FILLED ORAL at 09:14

## 2025-04-18 RX ADMIN — NITROGLYCERIN 1 INCH: 20 OINTMENT TOPICAL at 06:22

## 2025-04-18 RX ADMIN — AMLODIPINE BESYLATE 5 MG: 5 TABLET ORAL at 09:14

## 2025-04-18 RX ADMIN — INSULIN LISPRO 2 UNITS: 100 INJECTION, SOLUTION INTRAVENOUS; SUBCUTANEOUS at 09:13

## 2025-04-18 RX ADMIN — DOXYCYCLINE 100 MG: 100 INJECTION, POWDER, LYOPHILIZED, FOR SOLUTION INTRAVENOUS at 22:52

## 2025-04-18 RX ADMIN — INSULIN LISPRO 15 UNITS: 100 INJECTION, SOLUTION INTRAVENOUS; SUBCUTANEOUS at 09:13

## 2025-04-18 RX ADMIN — 0.12% CHLORHEXIDINE GLUCONATE 15 ML: 1.2 RINSE ORAL at 22:46

## 2025-04-18 RX ADMIN — SODIUM CHLORIDE, PRESERVATIVE FREE 10 ML: 5 INJECTION INTRAVENOUS at 22:45

## 2025-04-18 RX ADMIN — AMIODARONE HYDROCHLORIDE 400 MG: 200 TABLET ORAL at 22:44

## 2025-04-18 RX ADMIN — DOCUSATE SODIUM 100 MG: 100 CAPSULE, LIQUID FILLED ORAL at 22:44

## 2025-04-18 RX ADMIN — INSULIN LISPRO 15 UNITS: 100 INJECTION, SOLUTION INTRAVENOUS; SUBCUTANEOUS at 18:15

## 2025-04-18 RX ADMIN — ATORVASTATIN CALCIUM 80 MG: 40 TABLET, FILM COATED ORAL at 09:14

## 2025-04-18 RX ADMIN — HEPARIN SODIUM AND DEXTROSE 13 UNITS/KG/HR: 10000; 5 INJECTION INTRAVENOUS at 15:15

## 2025-04-18 RX ADMIN — NITROGLYCERIN 1 INCH: 20 OINTMENT TOPICAL at 18:14

## 2025-04-18 RX ADMIN — ASPIRIN 81 MG: 81 TABLET, CHEWABLE ORAL at 09:14

## 2025-04-18 RX ADMIN — METOPROLOL SUCCINATE 100 MG: 50 TABLET, EXTENDED RELEASE ORAL at 09:14

## 2025-04-18 RX ADMIN — NITROGLYCERIN 1 INCH: 20 OINTMENT TOPICAL at 22:48

## 2025-04-18 RX ADMIN — AMIODARONE HYDROCHLORIDE 200 MG: 200 TABLET ORAL at 09:14

## 2025-04-18 NOTE — DIABETES MGMT
This is a 45-year-old male with a history of type 2 diabetes mellitus, coronary artery disease status post MI x 3 with multiple PCI, and hypertension who presented to [facility] on 4/11/2025 with complaints of substernal chest pain.  Admission labs notable for glucose 245, A1c 10.3%, creatinine 0.92, bicarb 24, normal troponins.  He was transferred to [facility] where he underwent repeat cardiac catheterization revealing multivessel disease.  He did receive multiple doses of prednisone for dye allergy.  He is now admitted in anticipation of CABG on Monday.  We are asked to assist in glucose management.     Patient tells me that he has had diabetes for 10 years and for the most part has been on Lantus and Humalog.  [facility] is [facility] by [facility] and their most recent note indicates a regimen of 50 units of Lantus, Humalog 4 units with meals, Farxiga 5 mg and Trulicity 0.75 mg weekly.  He claims adherence to the medical regimen.  He tells me today that he previously drank a lot of sodas but within the last month has switched to 0 sugar sodas.  He lives with his mother who also has diabetes.    Over the last several days, we have been adjusting his long-acting insulin with good result.  He is currently receiving 40 units of glargine once daily and 15 units of mealtime insulin.  Bg's improving but slight above goal this morning. I made a slight adjustment in the basal insulin dose. The patient reports using insulin PTA. He also uses a CGM. Encouraged patient to discuss CGM upgrade with his PCP.         Anion gap 5  Creatine 0.69  Potassium 3.6  Sodium 135.   CO2 24  Impression  1.  Type 2 diabetes mellitus with improving glucose control with increasing insulin  2.  Coronary artery disease scheduled for CABG on Monday    Rest per team    Before making these care recommendations, I personally reviewed the hospitalization record, including notes, laboratory & diagnostic data

## 2025-04-18 NOTE — CONSENT
Informed Consent for Blood Component Transfusion Note    I have discussed with the patient the rationale for blood component transfusion; its benefits in treating or preventing fatigue, organ damage, or death; and its risk which includes mild transfusion reactions, rare risk of blood borne infection, or more serious but rare reactions. I have discussed the alternatives to transfusion, including the risk and consequences of not receiving transfusion. The patient had an opportunity to ask questions and had agreed to proceed with transfusion of blood components.    Electronically signed by Yudith Shell PA-C on 4/18/25 at 5:12 PM EDT

## 2025-04-19 ENCOUNTER — APPOINTMENT (OUTPATIENT)
Facility: HOSPITAL | Age: 45
DRG: 165 | End: 2025-04-19
Attending: INTERNAL MEDICINE
Payer: COMMERCIAL

## 2025-04-19 LAB
ANION GAP SERPL CALC-SCNC: 6 MMOL/L (ref 2–12)
BUN SERPL-MCNC: 10 MG/DL (ref 6–20)
BUN/CREAT SERPL: 14 (ref 12–20)
CALCIUM SERPL-MCNC: 8.9 MG/DL (ref 8.5–10.1)
CHLORIDE SERPL-SCNC: 106 MMOL/L (ref 97–108)
CO2 SERPL-SCNC: 25 MMOL/L (ref 21–32)
CREAT SERPL-MCNC: 0.72 MG/DL (ref 0.7–1.3)
ERYTHROCYTE [DISTWIDTH] IN BLOOD BY AUTOMATED COUNT: 14.6 % (ref 11.5–14.5)
GLUCOSE BLD STRIP.AUTO-MCNC: 122 MG/DL (ref 65–117)
GLUCOSE BLD STRIP.AUTO-MCNC: 156 MG/DL (ref 65–117)
GLUCOSE BLD STRIP.AUTO-MCNC: 174 MG/DL (ref 65–117)
GLUCOSE BLD STRIP.AUTO-MCNC: 238 MG/DL (ref 65–117)
GLUCOSE SERPL-MCNC: 166 MG/DL (ref 65–100)
HCT VFR BLD AUTO: 38.6 % (ref 36.6–50.3)
HGB BLD-MCNC: 13.2 G/DL (ref 12.1–17)
MAGNESIUM SERPL-MCNC: 1.8 MG/DL (ref 1.6–2.4)
MCH RBC QN AUTO: 30.1 PG (ref 26–34)
MCHC RBC AUTO-ENTMCNC: 34.2 G/DL (ref 30–36.5)
MCV RBC AUTO: 88.1 FL (ref 80–99)
NRBC # BLD: 0 K/UL (ref 0–0.01)
NRBC BLD-RTO: 0 PER 100 WBC
PHOSPHATE SERPL-MCNC: 4 MG/DL (ref 2.6–4.7)
PLATELET # BLD AUTO: 225 K/UL (ref 150–400)
PMV BLD AUTO: 10.6 FL (ref 8.9–12.9)
POTASSIUM SERPL-SCNC: 3.5 MMOL/L (ref 3.5–5.1)
RBC # BLD AUTO: 4.38 M/UL (ref 4.1–5.7)
SERVICE CMNT-IMP: ABNORMAL
SODIUM SERPL-SCNC: 137 MMOL/L (ref 136–145)
UFH PPP CHRO-ACNC: 0.43 IU/ML
WBC # BLD AUTO: 9.8 K/UL (ref 4.1–11.1)

## 2025-04-19 PROCEDURE — 6370000000 HC RX 637 (ALT 250 FOR IP): Performed by: PHYSICIAN ASSISTANT

## 2025-04-19 PROCEDURE — 2500000003 HC RX 250 WO HCPCS: Performed by: STUDENT IN AN ORGANIZED HEALTH CARE EDUCATION/TRAINING PROGRAM

## 2025-04-19 PROCEDURE — 6370000000 HC RX 637 (ALT 250 FOR IP): Performed by: STUDENT IN AN ORGANIZED HEALTH CARE EDUCATION/TRAINING PROGRAM

## 2025-04-19 PROCEDURE — 6370000000 HC RX 637 (ALT 250 FOR IP): Performed by: INTERNAL MEDICINE

## 2025-04-19 PROCEDURE — 6360000002 HC RX W HCPCS: Performed by: STUDENT IN AN ORGANIZED HEALTH CARE EDUCATION/TRAINING PROGRAM

## 2025-04-19 PROCEDURE — 2500000003 HC RX 250 WO HCPCS: Performed by: INTERNAL MEDICINE

## 2025-04-19 PROCEDURE — 84100 ASSAY OF PHOSPHORUS: CPT

## 2025-04-19 PROCEDURE — 36415 COLL VENOUS BLD VENIPUNCTURE: CPT

## 2025-04-19 PROCEDURE — 85520 HEPARIN ASSAY: CPT

## 2025-04-19 PROCEDURE — 70487 CT MAXILLOFACIAL W/DYE: CPT

## 2025-04-19 PROCEDURE — 6360000004 HC RX CONTRAST MEDICATION: Performed by: RADIOLOGY

## 2025-04-19 PROCEDURE — 85027 COMPLETE CBC AUTOMATED: CPT

## 2025-04-19 PROCEDURE — 2580000003 HC RX 258: Performed by: STUDENT IN AN ORGANIZED HEALTH CARE EDUCATION/TRAINING PROGRAM

## 2025-04-19 PROCEDURE — 2060000000 HC ICU INTERMEDIATE R&B

## 2025-04-19 PROCEDURE — 82962 GLUCOSE BLOOD TEST: CPT

## 2025-04-19 PROCEDURE — 80048 BASIC METABOLIC PNL TOTAL CA: CPT

## 2025-04-19 PROCEDURE — 6360000002 HC RX W HCPCS: Performed by: PHYSICIAN ASSISTANT

## 2025-04-19 PROCEDURE — 83735 ASSAY OF MAGNESIUM: CPT

## 2025-04-19 RX ORDER — IOPAMIDOL 755 MG/ML
100 INJECTION, SOLUTION INTRAVASCULAR
Status: COMPLETED | OUTPATIENT
Start: 2025-04-19 | End: 2025-04-19

## 2025-04-19 RX ORDER — LORAZEPAM 1 MG/1
1 TABLET ORAL NIGHTLY PRN
Status: DISCONTINUED | OUTPATIENT
Start: 2025-04-19 | End: 2025-04-21

## 2025-04-19 RX ADMIN — ACETAMINOPHEN 650 MG: 325 TABLET ORAL at 22:49

## 2025-04-19 RX ADMIN — ATORVASTATIN CALCIUM 80 MG: 40 TABLET, FILM COATED ORAL at 09:51

## 2025-04-19 RX ADMIN — NITROGLYCERIN 1 INCH: 20 OINTMENT TOPICAL at 22:49

## 2025-04-19 RX ADMIN — DOXYCYCLINE 100 MG: 100 INJECTION, POWDER, LYOPHILIZED, FOR SOLUTION INTRAVENOUS at 11:34

## 2025-04-19 RX ADMIN — INSULIN LISPRO 15 UNITS: 100 INJECTION, SOLUTION INTRAVENOUS; SUBCUTANEOUS at 09:50

## 2025-04-19 RX ADMIN — METOPROLOL SUCCINATE 100 MG: 50 TABLET, EXTENDED RELEASE ORAL at 09:51

## 2025-04-19 RX ADMIN — NITROGLYCERIN 1 INCH: 20 OINTMENT TOPICAL at 11:30

## 2025-04-19 RX ADMIN — WATER 1000 MG: 1 INJECTION INTRAMUSCULAR; INTRAVENOUS; SUBCUTANEOUS at 18:18

## 2025-04-19 RX ADMIN — LORAZEPAM 1 MG: 1 TABLET ORAL at 22:49

## 2025-04-19 RX ADMIN — SODIUM CHLORIDE, PRESERVATIVE FREE 10 ML: 5 INJECTION INTRAVENOUS at 22:56

## 2025-04-19 RX ADMIN — DOCUSATE SODIUM 100 MG: 100 CAPSULE, LIQUID FILLED ORAL at 09:52

## 2025-04-19 RX ADMIN — 0.12% CHLORHEXIDINE GLUCONATE 15 ML: 1.2 RINSE ORAL at 22:47

## 2025-04-19 RX ADMIN — SODIUM CHLORIDE, PRESERVATIVE FREE 10 ML: 5 INJECTION INTRAVENOUS at 09:53

## 2025-04-19 RX ADMIN — AMLODIPINE BESYLATE 5 MG: 5 TABLET ORAL at 09:51

## 2025-04-19 RX ADMIN — HEPARIN SODIUM AND DEXTROSE 13 UNITS/KG/HR: 10000; 5 INJECTION INTRAVENOUS at 10:02

## 2025-04-19 RX ADMIN — INSULIN LISPRO 15 UNITS: 100 INJECTION, SOLUTION INTRAVENOUS; SUBCUTANEOUS at 18:15

## 2025-04-19 RX ADMIN — INSULIN GLARGINE 44 UNITS: 100 INJECTION, SOLUTION SUBCUTANEOUS at 09:50

## 2025-04-19 RX ADMIN — NITROGLYCERIN 1 INCH: 20 OINTMENT TOPICAL at 18:17

## 2025-04-19 RX ADMIN — NITROGLYCERIN 1 INCH: 20 OINTMENT TOPICAL at 06:11

## 2025-04-19 RX ADMIN — MUPIROCIN: 20 OINTMENT TOPICAL at 22:46

## 2025-04-19 RX ADMIN — MELATONIN 3 MG: at 22:49

## 2025-04-19 RX ADMIN — MUPIROCIN: 20 OINTMENT TOPICAL at 09:52

## 2025-04-19 RX ADMIN — INSULIN LISPRO 15 UNITS: 100 INJECTION, SOLUTION INTRAVENOUS; SUBCUTANEOUS at 13:27

## 2025-04-19 RX ADMIN — DOCUSATE SODIUM 100 MG: 100 CAPSULE, LIQUID FILLED ORAL at 22:49

## 2025-04-19 RX ADMIN — 0.12% CHLORHEXIDINE GLUCONATE 15 ML: 1.2 RINSE ORAL at 09:51

## 2025-04-19 RX ADMIN — DOXYCYCLINE 100 MG: 100 INJECTION, POWDER, LYOPHILIZED, FOR SOLUTION INTRAVENOUS at 22:55

## 2025-04-19 RX ADMIN — AMIODARONE HYDROCHLORIDE 400 MG: 200 TABLET ORAL at 09:51

## 2025-04-19 RX ADMIN — ASPIRIN 81 MG: 81 TABLET, CHEWABLE ORAL at 09:51

## 2025-04-19 RX ADMIN — AMIODARONE HYDROCHLORIDE 400 MG: 200 TABLET ORAL at 22:48

## 2025-04-19 RX ADMIN — INSULIN LISPRO 2 UNITS: 100 INJECTION, SOLUTION INTRAVENOUS; SUBCUTANEOUS at 09:50

## 2025-04-19 RX ADMIN — IOPAMIDOL 100 ML: 755 INJECTION, SOLUTION INTRAVENOUS at 20:51

## 2025-04-20 LAB
ACID FAST STN SPEC: NEGATIVE
ANION GAP SERPL CALC-SCNC: 7 MMOL/L (ref 2–12)
BACTERIA SPEC CULT: NORMAL
BASOPHILS # BLD: 0.05 K/UL (ref 0–0.1)
BASOPHILS NFR BLD: 0.4 % (ref 0–1)
BUN SERPL-MCNC: 7 MG/DL (ref 6–20)
BUN/CREAT SERPL: 11 (ref 12–20)
CALCIUM SERPL-MCNC: 9.1 MG/DL (ref 8.5–10.1)
CHLORIDE SERPL-SCNC: 106 MMOL/L (ref 97–108)
CO2 SERPL-SCNC: 23 MMOL/L (ref 21–32)
CREAT SERPL-MCNC: 0.66 MG/DL (ref 0.7–1.3)
DIFFERENTIAL METHOD BLD: ABNORMAL
EOSINOPHIL # BLD: 0.28 K/UL (ref 0–0.4)
EOSINOPHIL NFR BLD: 2.4 % (ref 0–7)
ERYTHROCYTE [DISTWIDTH] IN BLOOD BY AUTOMATED COUNT: 15 % (ref 11.5–14.5)
GLUCOSE BLD STRIP.AUTO-MCNC: 100 MG/DL (ref 65–117)
GLUCOSE BLD STRIP.AUTO-MCNC: 128 MG/DL (ref 65–117)
GLUCOSE BLD STRIP.AUTO-MCNC: 134 MG/DL (ref 65–117)
GLUCOSE BLD STRIP.AUTO-MCNC: 137 MG/DL (ref 65–117)
GLUCOSE BLD STRIP.AUTO-MCNC: 138 MG/DL (ref 65–117)
GLUCOSE BLD STRIP.AUTO-MCNC: 150 MG/DL (ref 65–117)
GLUCOSE BLD STRIP.AUTO-MCNC: 151 MG/DL (ref 65–117)
GLUCOSE BLD STRIP.AUTO-MCNC: 151 MG/DL (ref 65–117)
GLUCOSE BLD STRIP.AUTO-MCNC: 157 MG/DL (ref 65–117)
GLUCOSE BLD STRIP.AUTO-MCNC: 176 MG/DL (ref 65–117)
GLUCOSE BLD STRIP.AUTO-MCNC: 178 MG/DL (ref 65–117)
GLUCOSE BLD STRIP.AUTO-MCNC: 185 MG/DL (ref 65–117)
GLUCOSE BLD STRIP.AUTO-MCNC: 210 MG/DL (ref 65–117)
GLUCOSE BLD STRIP.AUTO-MCNC: 76 MG/DL (ref 65–117)
GLUCOSE BLD STRIP.AUTO-MCNC: 94 MG/DL (ref 65–117)
GLUCOSE SERPL-MCNC: 177 MG/DL (ref 65–100)
HCT VFR BLD AUTO: 37.9 % (ref 36.6–50.3)
HGB BLD-MCNC: 13 G/DL (ref 12.1–17)
HISTORY CHECK: NORMAL
IMM GRANULOCYTES # BLD AUTO: 0.06 K/UL (ref 0–0.04)
IMM GRANULOCYTES NFR BLD AUTO: 0.5 % (ref 0–0.5)
LYMPHOCYTES # BLD: 2.25 K/UL (ref 0.8–3.5)
LYMPHOCYTES NFR BLD: 19.6 % (ref 12–49)
MAGNESIUM SERPL-MCNC: 1.6 MG/DL (ref 1.6–2.4)
MCH RBC QN AUTO: 30.2 PG (ref 26–34)
MCHC RBC AUTO-ENTMCNC: 34.3 G/DL (ref 30–36.5)
MCV RBC AUTO: 88.1 FL (ref 80–99)
MONOCYTES # BLD: 1.19 K/UL (ref 0–1)
MONOCYTES NFR BLD: 10.4 % (ref 5–13)
MYCOBACTERIUM SPEC QL CULT: NORMAL
NEUTS SEG # BLD: 7.63 K/UL (ref 1.8–8)
NEUTS SEG NFR BLD: 66.7 % (ref 32–75)
NRBC # BLD: 0 K/UL (ref 0–0.01)
NRBC BLD-RTO: 0 PER 100 WBC
PHOSPHATE SERPL-MCNC: 4.2 MG/DL (ref 2.6–4.7)
PLATELET # BLD AUTO: 228 K/UL (ref 150–400)
PMV BLD AUTO: 10.7 FL (ref 8.9–12.9)
POTASSIUM SERPL-SCNC: 3.2 MMOL/L (ref 3.5–5.1)
RBC # BLD AUTO: 4.3 M/UL (ref 4.1–5.7)
SERVICE CMNT-IMP: ABNORMAL
SERVICE CMNT-IMP: NORMAL
SODIUM SERPL-SCNC: 136 MMOL/L (ref 136–145)
SPECIMEN PREPARATION: NORMAL
SPECIMEN SOURCE: NORMAL
UFH PPP CHRO-ACNC: 0.46 IU/ML
WBC # BLD AUTO: 11.5 K/UL (ref 4.1–11.1)

## 2025-04-20 PROCEDURE — 84100 ASSAY OF PHOSPHORUS: CPT

## 2025-04-20 PROCEDURE — 80048 BASIC METABOLIC PNL TOTAL CA: CPT

## 2025-04-20 PROCEDURE — 82962 GLUCOSE BLOOD TEST: CPT

## 2025-04-20 PROCEDURE — 6370000000 HC RX 637 (ALT 250 FOR IP): Performed by: STUDENT IN AN ORGANIZED HEALTH CARE EDUCATION/TRAINING PROGRAM

## 2025-04-20 PROCEDURE — 2060000000 HC ICU INTERMEDIATE R&B

## 2025-04-20 PROCEDURE — 6370000000 HC RX 637 (ALT 250 FOR IP): Performed by: INTERNAL MEDICINE

## 2025-04-20 PROCEDURE — 2500000003 HC RX 250 WO HCPCS: Performed by: INTERNAL MEDICINE

## 2025-04-20 PROCEDURE — 2580000003 HC RX 258: Performed by: PHYSICIAN ASSISTANT

## 2025-04-20 PROCEDURE — 86850 RBC ANTIBODY SCREEN: CPT

## 2025-04-20 PROCEDURE — 86900 BLOOD TYPING SEROLOGIC ABO: CPT

## 2025-04-20 PROCEDURE — 85520 HEPARIN ASSAY: CPT

## 2025-04-20 PROCEDURE — 6370000000 HC RX 637 (ALT 250 FOR IP): Performed by: PHYSICIAN ASSISTANT

## 2025-04-20 PROCEDURE — 2580000003 HC RX 258: Performed by: STUDENT IN AN ORGANIZED HEALTH CARE EDUCATION/TRAINING PROGRAM

## 2025-04-20 PROCEDURE — 83735 ASSAY OF MAGNESIUM: CPT

## 2025-04-20 PROCEDURE — 85025 COMPLETE CBC W/AUTO DIFF WBC: CPT

## 2025-04-20 PROCEDURE — 6360000002 HC RX W HCPCS: Performed by: STUDENT IN AN ORGANIZED HEALTH CARE EDUCATION/TRAINING PROGRAM

## 2025-04-20 PROCEDURE — 2500000003 HC RX 250 WO HCPCS: Performed by: STUDENT IN AN ORGANIZED HEALTH CARE EDUCATION/TRAINING PROGRAM

## 2025-04-20 PROCEDURE — 36415 COLL VENOUS BLD VENIPUNCTURE: CPT

## 2025-04-20 PROCEDURE — 86901 BLOOD TYPING SEROLOGIC RH(D): CPT

## 2025-04-20 PROCEDURE — 2500000003 HC RX 250 WO HCPCS: Performed by: PHYSICIAN ASSISTANT

## 2025-04-20 PROCEDURE — 86923 COMPATIBILITY TEST ELECTRIC: CPT

## 2025-04-20 RX ORDER — MAGNESIUM SULFATE IN WATER 40 MG/ML
2000 INJECTION, SOLUTION INTRAVENOUS ONCE
Status: COMPLETED | OUTPATIENT
Start: 2025-04-20 | End: 2025-04-20

## 2025-04-20 RX ORDER — INSULIN LISPRO 100 [IU]/ML
1-20 INJECTION, SOLUTION INTRAVENOUS; SUBCUTANEOUS
Status: DISCONTINUED | OUTPATIENT
Start: 2025-04-20 | End: 2025-04-21

## 2025-04-20 RX ORDER — GABAPENTIN 300 MG/1
300 CAPSULE ORAL ONCE
Status: COMPLETED | OUTPATIENT
Start: 2025-04-21 | End: 2025-04-21

## 2025-04-20 RX ORDER — ACETAMINOPHEN 500 MG
1000 TABLET ORAL ONCE
Status: COMPLETED | OUTPATIENT
Start: 2025-04-21 | End: 2025-04-21

## 2025-04-20 RX ORDER — POTASSIUM CHLORIDE 750 MG/1
40 TABLET, EXTENDED RELEASE ORAL ONCE
Status: DISCONTINUED | OUTPATIENT
Start: 2025-04-20 | End: 2025-04-21

## 2025-04-20 RX ORDER — ACETAMINOPHEN 500 MG
1000 TABLET ORAL ONCE
Status: DISCONTINUED | OUTPATIENT
Start: 2025-04-20 | End: 2025-04-20

## 2025-04-20 RX ORDER — GABAPENTIN 300 MG/1
300 CAPSULE ORAL ONCE
Status: DISCONTINUED | OUTPATIENT
Start: 2025-04-20 | End: 2025-04-20

## 2025-04-20 RX ADMIN — METOPROLOL SUCCINATE 100 MG: 50 TABLET, EXTENDED RELEASE ORAL at 09:38

## 2025-04-20 RX ADMIN — AMIODARONE HYDROCHLORIDE 400 MG: 200 TABLET ORAL at 09:28

## 2025-04-20 RX ADMIN — LORAZEPAM 1 MG: 1 TABLET ORAL at 22:42

## 2025-04-20 RX ADMIN — DOXYCYCLINE 100 MG: 100 INJECTION, POWDER, LYOPHILIZED, FOR SOLUTION INTRAVENOUS at 22:55

## 2025-04-20 RX ADMIN — ASPIRIN 81 MG: 81 TABLET, CHEWABLE ORAL at 09:28

## 2025-04-20 RX ADMIN — ATORVASTATIN CALCIUM 80 MG: 40 TABLET, FILM COATED ORAL at 09:27

## 2025-04-20 RX ADMIN — SODIUM CHLORIDE, PRESERVATIVE FREE 10 ML: 5 INJECTION INTRAVENOUS at 09:33

## 2025-04-20 RX ADMIN — MUPIROCIN: 20 OINTMENT TOPICAL at 09:44

## 2025-04-20 RX ADMIN — NITROGLYCERIN 1 INCH: 20 OINTMENT TOPICAL at 06:50

## 2025-04-20 RX ADMIN — SODIUM CHLORIDE, PRESERVATIVE FREE 10 ML: 5 INJECTION INTRAVENOUS at 09:30

## 2025-04-20 RX ADMIN — MUPIROCIN: 20 OINTMENT TOPICAL at 21:14

## 2025-04-20 RX ADMIN — POTASSIUM CHLORIDE 40 MEQ: 1500 TABLET, EXTENDED RELEASE ORAL at 09:28

## 2025-04-20 RX ADMIN — DOCUSATE SODIUM 100 MG: 100 CAPSULE, LIQUID FILLED ORAL at 09:29

## 2025-04-20 RX ADMIN — NITROGLYCERIN 1 INCH: 20 OINTMENT TOPICAL at 12:14

## 2025-04-20 RX ADMIN — INSULIN LISPRO 2 UNITS: 100 INJECTION, SOLUTION INTRAVENOUS; SUBCUTANEOUS at 09:31

## 2025-04-20 RX ADMIN — SODIUM CHLORIDE 2.4 UNITS/HR: 9 INJECTION, SOLUTION INTRAVENOUS at 10:06

## 2025-04-20 RX ADMIN — INSULIN LISPRO 15 UNITS: 100 INJECTION, SOLUTION INTRAVENOUS; SUBCUTANEOUS at 09:39

## 2025-04-20 RX ADMIN — AMIODARONE HYDROCHLORIDE 400 MG: 200 TABLET ORAL at 21:13

## 2025-04-20 RX ADMIN — MAGNESIUM SULFATE HEPTAHYDRATE 2000 MG: 40 INJECTION, SOLUTION INTRAVENOUS at 09:52

## 2025-04-20 RX ADMIN — WATER 1000 MG: 1 INJECTION INTRAMUSCULAR; INTRAVENOUS; SUBCUTANEOUS at 16:03

## 2025-04-20 RX ADMIN — AMLODIPINE BESYLATE 5 MG: 5 TABLET ORAL at 09:28

## 2025-04-20 RX ADMIN — SODIUM CHLORIDE: 9 INJECTION, SOLUTION INTRAVENOUS at 12:21

## 2025-04-20 RX ADMIN — NITROGLYCERIN 1 INCH: 20 OINTMENT TOPICAL at 18:23

## 2025-04-20 RX ADMIN — MELATONIN 3 MG: at 22:42

## 2025-04-20 RX ADMIN — DOXYCYCLINE 100 MG: 100 INJECTION, POWDER, LYOPHILIZED, FOR SOLUTION INTRAVENOUS at 12:30

## 2025-04-20 RX ADMIN — 0.12% CHLORHEXIDINE GLUCONATE 15 ML: 1.2 RINSE ORAL at 09:30

## 2025-04-20 RX ADMIN — SODIUM CHLORIDE: 9 INJECTION, SOLUTION INTRAVENOUS at 09:57

## 2025-04-20 RX ADMIN — DOCUSATE SODIUM 100 MG: 100 CAPSULE, LIQUID FILLED ORAL at 21:13

## 2025-04-20 RX ADMIN — NITROGLYCERIN 1 INCH: 20 OINTMENT TOPICAL at 22:56

## 2025-04-20 RX ADMIN — INSULIN LISPRO 2 UNITS: 100 INJECTION, SOLUTION INTRAVENOUS; SUBCUTANEOUS at 15:04

## 2025-04-20 RX ADMIN — SODIUM CHLORIDE, PRESERVATIVE FREE 10 ML: 5 INJECTION INTRAVENOUS at 21:13

## 2025-04-20 RX ADMIN — INSULIN LISPRO 5 UNITS: 100 INJECTION, SOLUTION INTRAVENOUS; SUBCUTANEOUS at 18:22

## 2025-04-20 RX ADMIN — 0.12% CHLORHEXIDINE GLUCONATE 15 ML: 1.2 RINSE ORAL at 21:13

## 2025-04-20 ASSESSMENT — PAIN SCALES - GENERAL: PAINLEVEL_OUTOF10: 0

## 2025-04-21 ENCOUNTER — ANESTHESIA (OUTPATIENT)
Facility: HOSPITAL | Age: 45
End: 2025-04-21
Payer: COMMERCIAL

## 2025-04-21 ENCOUNTER — ANESTHESIA EVENT (OUTPATIENT)
Facility: HOSPITAL | Age: 45
End: 2025-04-21
Payer: COMMERCIAL

## 2025-04-21 ENCOUNTER — HOSPITAL ENCOUNTER (OUTPATIENT)
Facility: HOSPITAL | Age: 45
Discharge: HOME | End: 2025-04-23
Attending: THORACIC SURGERY (CARDIOTHORACIC VASCULAR SURGERY)

## 2025-04-21 ENCOUNTER — APPOINTMENT (OUTPATIENT)
Facility: HOSPITAL | Age: 45
DRG: 165 | End: 2025-04-21
Attending: INTERNAL MEDICINE
Payer: COMMERCIAL

## 2025-04-21 PROBLEM — Z98.890 S/P LEFT ATRIAL APPENDAGE LIGATION: Status: ACTIVE | Noted: 2025-04-21

## 2025-04-21 PROBLEM — Z95.1 S/P CABG X 2: Status: ACTIVE | Noted: 2025-04-21

## 2025-04-21 LAB
ALBUMIN SERPL-MCNC: 3.4 G/DL (ref 3.5–5)
ALBUMIN SERPL-MCNC: 3.5 G/DL (ref 3.5–5)
ALBUMIN/GLOB SERPL: 1.2 (ref 1.1–2.2)
ALBUMIN/GLOB SERPL: 1.3 (ref 1.1–2.2)
ALP SERPL-CCNC: 87 U/L (ref 45–117)
ALP SERPL-CCNC: 92 U/L (ref 45–117)
ALT SERPL-CCNC: 58 U/L (ref 12–78)
ALT SERPL-CCNC: 60 U/L (ref 12–78)
ANION GAP BLD CALC-SCNC: 10 (ref 10–20)
ANION GAP BLD CALC-SCNC: 11 (ref 10–20)
ANION GAP BLD CALC-SCNC: 11 (ref 10–20)
ANION GAP BLD CALC-SCNC: 13 (ref 10–20)
ANION GAP BLD CALC-SCNC: 9 (ref 10–20)
ANION GAP BLD CALC-SCNC: 9 (ref 10–20)
ANION GAP SERPL CALC-SCNC: 5 MMOL/L (ref 2–12)
ANION GAP SERPL CALC-SCNC: 6 MMOL/L (ref 2–12)
APTT PPP: 27.3 SEC (ref 22.1–31)
ARTERIAL PATENCY WRIST A: ABNORMAL
AST SERPL-CCNC: 55 U/L (ref 15–37)
AST SERPL-CCNC: 60 U/L (ref 15–37)
BACTERIA SPEC CULT: ABNORMAL
BACTERIA SPEC CULT: ABNORMAL
BASE DEFICIT BLD-SCNC: 0.6 MMOL/L
BASE DEFICIT BLD-SCNC: 1.2 MMOL/L
BASE DEFICIT BLD-SCNC: 1.4 MMOL/L
BASE DEFICIT BLD-SCNC: 3.6 MMOL/L
BASE DEFICIT BLD-SCNC: 5.4 MMOL/L
BASE DEFICIT BLDA-SCNC: 1.6 MMOL/L
BASE EXCESS BLD CALC-SCNC: 0 MMOL/L
BDY SITE: ABNORMAL
BILIRUB SERPL-MCNC: 1.3 MG/DL (ref 0.2–1)
BILIRUB SERPL-MCNC: 1.5 MG/DL (ref 0.2–1)
BUN SERPL-MCNC: 6 MG/DL (ref 6–20)
BUN SERPL-MCNC: 7 MG/DL (ref 6–20)
BUN/CREAT SERPL: 10 (ref 12–20)
BUN/CREAT SERPL: 8 (ref 12–20)
CA-I BLD-MCNC: 1.04 MMOL/L (ref 1.15–1.33)
CA-I BLD-MCNC: 1.07 MMOL/L (ref 1.15–1.33)
CA-I BLD-MCNC: 1.1 MMOL/L (ref 1.15–1.33)
CA-I BLD-MCNC: 1.14 MMOL/L (ref 1.15–1.33)
CA-I BLD-MCNC: 1.16 MMOL/L (ref 1.15–1.33)
CA-I BLD-MCNC: 1.28 MMOL/L (ref 1.15–1.33)
CA-I BLD-SCNC: 1.05 MMOL/L (ref 1.13–1.32)
CALCIUM SERPL-MCNC: 7.8 MG/DL (ref 8.5–10.1)
CALCIUM SERPL-MCNC: 9.1 MG/DL (ref 8.5–10.1)
CHLORIDE BLD-SCNC: 104 MMOL/L (ref 100–111)
CHLORIDE BLD-SCNC: 104 MMOL/L (ref 100–111)
CHLORIDE BLD-SCNC: 105 MMOL/L (ref 100–111)
CHLORIDE BLD-SCNC: 105 MMOL/L (ref 100–111)
CHLORIDE BLD-SCNC: 110 MMOL/L (ref 100–111)
CHLORIDE BLD-SCNC: 111 MMOL/L (ref 100–111)
CHLORIDE SERPL-SCNC: 110 MMOL/L (ref 97–108)
CHLORIDE SERPL-SCNC: 111 MMOL/L (ref 97–108)
CO2 BLD-SCNC: 19 MMOL/L (ref 22–29)
CO2 BLD-SCNC: 21 MMOL/L (ref 22–29)
CO2 BLD-SCNC: 23 MMOL/L (ref 22–29)
CO2 BLD-SCNC: 23 MMOL/L (ref 22–29)
CO2 BLD-SCNC: 25 MMOL/L (ref 22–29)
CO2 BLD-SCNC: 26 MMOL/L (ref 22–29)
CO2 SERPL-SCNC: 23 MMOL/L (ref 21–32)
CO2 SERPL-SCNC: 23 MMOL/L (ref 21–32)
CREAT SERPL-MCNC: 0.69 MG/DL (ref 0.7–1.3)
CREAT SERPL-MCNC: 0.76 MG/DL (ref 0.7–1.3)
CREAT UR-MCNC: 0.5 MG/DL (ref 0.6–1.3)
CREAT UR-MCNC: 0.6 MG/DL (ref 0.6–1.3)
CREAT UR-MCNC: 0.6 MG/DL (ref 0.6–1.3)
CREAT UR-MCNC: 0.7 MG/DL (ref 0.6–1.3)
CREAT UR-MCNC: 0.8 MG/DL (ref 0.6–1.3)
CREAT UR-MCNC: 0.8 MG/DL (ref 0.6–1.3)
ECHO BSA: 2.39 M2
ERYTHROCYTE [DISTWIDTH] IN BLOOD BY AUTOMATED COUNT: 15.1 % (ref 11.5–14.5)
ERYTHROCYTE [DISTWIDTH] IN BLOOD BY AUTOMATED COUNT: 15.1 % (ref 11.5–14.5)
FIO2 ON VENT: 80 %
GAS FLOW.O2 SETTING OXYMISER: 16
GLOBULIN SER CALC-MCNC: 2.6 G/DL (ref 2–4)
GLOBULIN SER CALC-MCNC: 3 G/DL (ref 2–4)
GLUCOSE BLD STRIP.AUTO-MCNC: 101 MG/DL (ref 65–117)
GLUCOSE BLD STRIP.AUTO-MCNC: 103 MG/DL (ref 65–117)
GLUCOSE BLD STRIP.AUTO-MCNC: 104 MG/DL (ref 65–117)
GLUCOSE BLD STRIP.AUTO-MCNC: 105 MG/DL (ref 65–117)
GLUCOSE BLD STRIP.AUTO-MCNC: 106 MG/DL (ref 65–117)
GLUCOSE BLD STRIP.AUTO-MCNC: 113 MG/DL (ref 65–117)
GLUCOSE BLD STRIP.AUTO-MCNC: 122 MG/DL (ref 65–117)
GLUCOSE BLD STRIP.AUTO-MCNC: 127 MG/DL (ref 74–99)
GLUCOSE BLD STRIP.AUTO-MCNC: 130 MG/DL (ref 65–117)
GLUCOSE BLD STRIP.AUTO-MCNC: 131 MG/DL (ref 74–99)
GLUCOSE BLD STRIP.AUTO-MCNC: 135 MG/DL (ref 65–117)
GLUCOSE BLD STRIP.AUTO-MCNC: 136 MG/DL (ref 65–117)
GLUCOSE BLD STRIP.AUTO-MCNC: 153 MG/DL (ref 65–117)
GLUCOSE BLD STRIP.AUTO-MCNC: 155 MG/DL (ref 74–99)
GLUCOSE BLD STRIP.AUTO-MCNC: 163 MG/DL (ref 74–99)
GLUCOSE BLD STRIP.AUTO-MCNC: 164 MG/DL (ref 65–117)
GLUCOSE BLD STRIP.AUTO-MCNC: 164 MG/DL (ref 65–117)
GLUCOSE BLD STRIP.AUTO-MCNC: 166 MG/DL (ref 74–99)
GLUCOSE BLD STRIP.AUTO-MCNC: 167 MG/DL (ref 65–117)
GLUCOSE BLD STRIP.AUTO-MCNC: 171 MG/DL (ref 74–99)
GLUCOSE BLD STRIP.AUTO-MCNC: 95 MG/DL (ref 65–117)
GLUCOSE SERPL-MCNC: 131 MG/DL (ref 65–100)
GLUCOSE SERPL-MCNC: 176 MG/DL (ref 65–100)
GRAM STN SPEC: ABNORMAL
GRAM STN SPEC: ABNORMAL
HCO3 BLDA-SCNC: 20 MMOL/L
HCO3 BLDA-SCNC: 22 MMOL/L
HCO3 BLDA-SCNC: 23 MMOL/L (ref 22–26)
HCO3 BLDA-SCNC: 24 MMOL/L
HCO3 BLDA-SCNC: 24 MMOL/L
HCO3 BLDA-SCNC: 26 MMOL/L
HCO3 BLDA-SCNC: 26 MMOL/L
HCT VFR BLD AUTO: 31.5 % (ref 36.6–50.3)
HCT VFR BLD AUTO: 31.8 % (ref 36.6–50.3)
HGB BLD-MCNC: 10.3 G/DL (ref 12.1–17)
HGB BLD-MCNC: 10.6 G/DL (ref 12.1–17)
INR PPP: 1.2 (ref 0.9–1.1)
LACTATE BLD-SCNC: 0.51 MMOL/L (ref 0.4–2)
LACTATE BLD-SCNC: 0.64 MMOL/L (ref 0.4–2)
LACTATE BLD-SCNC: 1 MMOL/L (ref 0.4–2)
LACTATE BLD-SCNC: 1.03 MMOL/L (ref 0.4–2)
LACTATE BLD-SCNC: <0.3 MMOL/L (ref 0.4–2)
LACTATE BLD-SCNC: <0.3 MMOL/L (ref 0.4–2)
MAGNESIUM SERPL-MCNC: 3.2 MG/DL (ref 1.6–2.4)
MCH RBC QN AUTO: 29.5 PG (ref 26–34)
MCH RBC QN AUTO: 29.9 PG (ref 26–34)
MCHC RBC AUTO-ENTMCNC: 32.7 G/DL (ref 30–36.5)
MCHC RBC AUTO-ENTMCNC: 33.3 G/DL (ref 30–36.5)
MCV RBC AUTO: 89.6 FL (ref 80–99)
MCV RBC AUTO: 90.3 FL (ref 80–99)
NRBC # BLD: 0 K/UL (ref 0–0.01)
NRBC # BLD: 0 K/UL (ref 0–0.01)
NRBC BLD-RTO: 0 PER 100 WBC
NRBC BLD-RTO: 0 PER 100 WBC
PCO2 BLD: 36.5 MMHG (ref 35–48)
PCO2 BLD: 36.6 MMHG (ref 35–48)
PCO2 BLD: 39.5 MMHG (ref 35–48)
PCO2 BLD: 41.6 MMHG (ref 35–48)
PCO2 BLD: 57.1 MMHG (ref 35–48)
PCO2 BLDA: 37 MMHG (ref 35–45)
PCO2 BLDV: 46.6 MMHG (ref 41–51)
PEEP RESPIRATORY: 5
PH BLD: 7.27 (ref 7.35–7.45)
PH BLD: 7.33 (ref 7.35–7.45)
PH BLD: 7.34 (ref 7.35–7.45)
PH BLD: 7.39 (ref 7.35–7.45)
PH BLD: 7.42 (ref 7.35–7.45)
PH BLDA: 7.41 (ref 7.35–7.45)
PH BLDV: 7.35 (ref 7.32–7.42)
PLATELET # BLD AUTO: 156 K/UL (ref 150–400)
PLATELET # BLD AUTO: 188 K/UL (ref 150–400)
PMV BLD AUTO: 11 FL (ref 8.9–12.9)
PMV BLD AUTO: 11 FL (ref 8.9–12.9)
PO2 BLD: 250 MMHG (ref 83–108)
PO2 BLD: 313 MMHG (ref 83–108)
PO2 BLD: 398 MMHG (ref 83–108)
PO2 BLD: 418 MMHG (ref 83–108)
PO2 BLD: 424 MMHG (ref 83–108)
PO2 BLDA: 336 MMHG (ref 80–100)
PO2 BLDV: 55 MMHG (ref 25–40)
POTASSIUM BLD-SCNC: 2.9 MMOL/L (ref 3.5–5.5)
POTASSIUM BLD-SCNC: 3.8 MMOL/L (ref 3.5–5.5)
POTASSIUM BLD-SCNC: 3.9 MMOL/L (ref 3.5–5.5)
POTASSIUM BLD-SCNC: 4.1 MMOL/L (ref 3.5–5.5)
POTASSIUM BLD-SCNC: 4.6 MMOL/L (ref 3.5–5.5)
POTASSIUM BLD-SCNC: 4.6 MMOL/L (ref 3.5–5.5)
POTASSIUM SERPL-SCNC: 3.7 MMOL/L (ref 3.5–5.1)
POTASSIUM SERPL-SCNC: 3.9 MMOL/L (ref 3.5–5.1)
PRESSURE SUPPORT SETTING VENT: 5
PROT SERPL-MCNC: 6 G/DL (ref 6.4–8.2)
PROT SERPL-MCNC: 6.5 G/DL (ref 6.4–8.2)
PROTHROMBIN TIME: 12.4 SEC (ref 9.2–11.2)
RBC # BLD AUTO: 3.49 M/UL (ref 4.1–5.7)
RBC # BLD AUTO: 3.55 M/UL (ref 4.1–5.7)
SAO2 % BLD: 100 % (ref 92–97)
SAO2 % BLD: 100 % (ref 94–98)
SAO2 % BLD: 87 % (ref 94–98)
SAO2% DEVICE SAO2% SENSOR NAME: ABNORMAL
SERVICE CMNT-IMP: ABNORMAL
SERVICE CMNT-IMP: NORMAL
SODIUM BLD-SCNC: 138 MMOL/L (ref 136–145)
SODIUM BLD-SCNC: 138 MMOL/L (ref 136–145)
SODIUM BLD-SCNC: 140 MMOL/L (ref 136–145)
SODIUM BLD-SCNC: 143 MMOL/L (ref 136–145)
SODIUM SERPL-SCNC: 138 MMOL/L (ref 136–145)
SODIUM SERPL-SCNC: 140 MMOL/L (ref 136–145)
SPECIMEN SITE: ABNORMAL
THERAPEUTIC RANGE: NORMAL SECS (ref 58–77)
VENTILATION MODE VENT: ABNORMAL
VT SETTING VENT: 510
WBC # BLD AUTO: 17.5 K/UL (ref 4.1–11.1)
WBC # BLD AUTO: 21.1 K/UL (ref 4.1–11.1)

## 2025-04-21 PROCEDURE — 6360000002 HC RX W HCPCS: Performed by: PHYSICIAN ASSISTANT

## 2025-04-21 PROCEDURE — 2580000003 HC RX 258: Performed by: PHYSICIAN ASSISTANT

## 2025-04-21 PROCEDURE — 2500000003 HC RX 250 WO HCPCS: Performed by: PHYSICIAN ASSISTANT

## 2025-04-21 PROCEDURE — 6370000000 HC RX 637 (ALT 250 FOR IP)

## 2025-04-21 PROCEDURE — 2580000003 HC RX 258: Performed by: THORACIC SURGERY (CARDIOTHORACIC VASCULAR SURGERY)

## 2025-04-21 PROCEDURE — 82947 ASSAY GLUCOSE BLOOD QUANT: CPT

## 2025-04-21 PROCEDURE — 6370000000 HC RX 637 (ALT 250 FOR IP): Performed by: STUDENT IN AN ORGANIZED HEALTH CARE EDUCATION/TRAINING PROGRAM

## 2025-04-21 PROCEDURE — 85027 COMPLETE CBC AUTOMATED: CPT

## 2025-04-21 PROCEDURE — 2500000003 HC RX 250 WO HCPCS: Performed by: ANESTHESIOLOGY

## 2025-04-21 PROCEDURE — A4648 IMPLANTABLE TISSUE MARKER: HCPCS | Performed by: THORACIC SURGERY (CARDIOTHORACIC VASCULAR SURGERY)

## 2025-04-21 PROCEDURE — 80053 COMPREHEN METABOLIC PANEL: CPT

## 2025-04-21 PROCEDURE — 2720000010 HC SURG SUPPLY STERILE: Performed by: THORACIC SURGERY (CARDIOTHORACIC VASCULAR SURGERY)

## 2025-04-21 PROCEDURE — P9045 ALBUMIN (HUMAN), 5%, 250 ML: HCPCS

## 2025-04-21 PROCEDURE — 2580000003 HC RX 258

## 2025-04-21 PROCEDURE — 02100Z9 BYPASS CORONARY ARTERY, ONE ARTERY FROM LEFT INTERNAL MAMMARY, OPEN APPROACH: ICD-10-PCS | Performed by: THORACIC SURGERY (CARDIOTHORACIC VASCULAR SURGERY)

## 2025-04-21 PROCEDURE — 85018 HEMOGLOBIN: CPT

## 2025-04-21 PROCEDURE — 021009W BYPASS CORONARY ARTERY, ONE ARTERY FROM AORTA WITH AUTOLOGOUS VENOUS TISSUE, OPEN APPROACH: ICD-10-PCS | Performed by: THORACIC SURGERY (CARDIOTHORACIC VASCULAR SURGERY)

## 2025-04-21 PROCEDURE — 83735 ASSAY OF MAGNESIUM: CPT

## 2025-04-21 PROCEDURE — 6360000002 HC RX W HCPCS: Performed by: ANESTHESIOLOGY

## 2025-04-21 PROCEDURE — 33268 EXCL LAA OPN OTH PX ANY METH: CPT | Performed by: PHYSICIAN ASSISTANT

## 2025-04-21 PROCEDURE — 6360000002 HC RX W HCPCS

## 2025-04-21 PROCEDURE — 3700000001 HC ADD 15 MINUTES (ANESTHESIA): Performed by: THORACIC SURGERY (CARDIOTHORACIC VASCULAR SURGERY)

## 2025-04-21 PROCEDURE — 6360000002 HC RX W HCPCS: Performed by: THORACIC SURGERY (CARDIOTHORACIC VASCULAR SURGERY)

## 2025-04-21 PROCEDURE — 33508 ENDOSCOPIC VEIN HARVEST: CPT | Performed by: THORACIC SURGERY (CARDIOTHORACIC VASCULAR SURGERY)

## 2025-04-21 PROCEDURE — 71045 X-RAY EXAM CHEST 1 VIEW: CPT

## 2025-04-21 PROCEDURE — C1751 CATH, INF, PER/CENT/MIDLINE: HCPCS | Performed by: THORACIC SURGERY (CARDIOTHORACIC VASCULAR SURGERY)

## 2025-04-21 PROCEDURE — 6360000002 HC RX W HCPCS: Performed by: INTERNAL MEDICINE

## 2025-04-21 PROCEDURE — 0J913ZX DRAINAGE OF FACE SUBCUTANEOUS TISSUE AND FASCIA, PERCUTANEOUS APPROACH, DIAGNOSTIC: ICD-10-PCS | Performed by: INTERNAL MEDICINE

## 2025-04-21 PROCEDURE — 2500000003 HC RX 250 WO HCPCS

## 2025-04-21 PROCEDURE — 85610 PROTHROMBIN TIME: CPT

## 2025-04-21 PROCEDURE — C1729 CATH, DRAINAGE: HCPCS | Performed by: THORACIC SURGERY (CARDIOTHORACIC VASCULAR SURGERY)

## 2025-04-21 PROCEDURE — 5A1221Z PERFORMANCE OF CARDIAC OUTPUT, CONTINUOUS: ICD-10-PCS | Performed by: THORACIC SURGERY (CARDIOTHORACIC VASCULAR SURGERY)

## 2025-04-21 PROCEDURE — 36415 COLL VENOUS BLD VENIPUNCTURE: CPT

## 2025-04-21 PROCEDURE — 6370000000 HC RX 637 (ALT 250 FOR IP): Performed by: PHYSICIAN ASSISTANT

## 2025-04-21 PROCEDURE — 3600000008 HC SURGERY OHS BASE: Performed by: THORACIC SURGERY (CARDIOTHORACIC VASCULAR SURGERY)

## 2025-04-21 PROCEDURE — 82962 GLUCOSE BLOOD TEST: CPT

## 2025-04-21 PROCEDURE — 3600000018 HC SURGERY OHS ADDTL 15MIN: Performed by: THORACIC SURGERY (CARDIOTHORACIC VASCULAR SURGERY)

## 2025-04-21 PROCEDURE — 82330 ASSAY OF CALCIUM: CPT

## 2025-04-21 PROCEDURE — C1889 IMPLANT/INSERT DEVICE, NOC: HCPCS | Performed by: THORACIC SURGERY (CARDIOTHORACIC VASCULAR SURGERY)

## 2025-04-21 PROCEDURE — 84132 ASSAY OF SERUM POTASSIUM: CPT

## 2025-04-21 PROCEDURE — 84295 ASSAY OF SERUM SODIUM: CPT

## 2025-04-21 PROCEDURE — 2709999900 HC NON-CHARGEABLE SUPPLY: Performed by: THORACIC SURGERY (CARDIOTHORACIC VASCULAR SURGERY)

## 2025-04-21 PROCEDURE — 33533 CABG ARTERIAL SINGLE: CPT | Performed by: THORACIC SURGERY (CARDIOTHORACIC VASCULAR SURGERY)

## 2025-04-21 PROCEDURE — 3700000000 HC ANESTHESIA ATTENDED CARE: Performed by: THORACIC SURGERY (CARDIOTHORACIC VASCULAR SURGERY)

## 2025-04-21 PROCEDURE — 33268 EXCL LAA OPN OTH PX ANY METH: CPT | Performed by: THORACIC SURGERY (CARDIOTHORACIC VASCULAR SURGERY)

## 2025-04-21 PROCEDURE — 2700000000 HC OXYGEN THERAPY PER DAY

## 2025-04-21 PROCEDURE — 33517 CABG ARTERY-VEIN SINGLE: CPT | Performed by: THORACIC SURGERY (CARDIOTHORACIC VASCULAR SURGERY)

## 2025-04-21 PROCEDURE — 33533 CABG ARTERIAL SINGLE: CPT | Performed by: PHYSICIAN ASSISTANT

## 2025-04-21 PROCEDURE — 37799 UNLISTED PX VASCULAR SURGERY: CPT

## 2025-04-21 PROCEDURE — 82803 BLOOD GASES ANY COMBINATION: CPT

## 2025-04-21 PROCEDURE — 06BP4ZZ EXCISION OF RIGHT SAPHENOUS VEIN, PERCUTANEOUS ENDOSCOPIC APPROACH: ICD-10-PCS | Performed by: THORACIC SURGERY (CARDIOTHORACIC VASCULAR SURGERY)

## 2025-04-21 PROCEDURE — 85730 THROMBOPLASTIN TIME PARTIAL: CPT

## 2025-04-21 PROCEDURE — 02L70CK OCCLUSION OF LEFT ATRIAL APPENDAGE WITH EXTRALUMINAL DEVICE, OPEN APPROACH: ICD-10-PCS | Performed by: THORACIC SURGERY (CARDIOTHORACIC VASCULAR SURGERY)

## 2025-04-21 PROCEDURE — 2100000001 HC CVICU R&B

## 2025-04-21 PROCEDURE — 2500000003 HC RX 250 WO HCPCS: Performed by: THORACIC SURGERY (CARDIOTHORACIC VASCULAR SURGERY)

## 2025-04-21 PROCEDURE — 94640 AIRWAY INHALATION TREATMENT: CPT

## 2025-04-21 PROCEDURE — 94002 VENT MGMT INPAT INIT DAY: CPT

## 2025-04-21 PROCEDURE — 33508 ENDOSCOPIC VEIN HARVEST: CPT | Performed by: PHYSICIAN ASSISTANT

## 2025-04-21 PROCEDURE — 93005 ELECTROCARDIOGRAM TRACING: CPT | Performed by: PHYSICIAN ASSISTANT

## 2025-04-21 PROCEDURE — C1713 ANCHOR/SCREW BN/BN,TIS/BN: HCPCS | Performed by: THORACIC SURGERY (CARDIOTHORACIC VASCULAR SURGERY)

## 2025-04-21 PROCEDURE — 33517 CABG ARTERY-VEIN SINGLE: CPT | Performed by: PHYSICIAN ASSISTANT

## 2025-04-21 PROCEDURE — 6360000002 HC RX W HCPCS: Performed by: NURSE ANESTHETIST, CERTIFIED REGISTERED

## 2025-04-21 DEVICE — APPLIER CLIP MED SUTURE LESS 45 MM SYS 1 HND STRL ATRICLIP FLX V: Type: IMPLANTABLE DEVICE | Site: HEART | Status: FUNCTIONAL

## 2025-04-21 RX ORDER — BISACODYL 10 MG
10 SUPPOSITORY, RECTAL RECTAL DAILY PRN
Status: DISCONTINUED | OUTPATIENT
Start: 2025-04-21 | End: 2025-04-27 | Stop reason: HOSPADM

## 2025-04-21 RX ORDER — ALBUMIN HUMAN 50 G/1000ML
12.5 SOLUTION INTRAVENOUS PRN
Status: DISCONTINUED | OUTPATIENT
Start: 2025-04-21 | End: 2025-04-27 | Stop reason: HOSPADM

## 2025-04-21 RX ORDER — MIDAZOLAM HYDROCHLORIDE 2 MG/2ML
1 INJECTION, SOLUTION INTRAMUSCULAR; INTRAVENOUS
Status: DISCONTINUED | OUTPATIENT
Start: 2025-04-21 | End: 2025-04-23

## 2025-04-21 RX ORDER — IPRATROPIUM BROMIDE AND ALBUTEROL SULFATE 2.5; .5 MG/3ML; MG/3ML
1 SOLUTION RESPIRATORY (INHALATION) EVERY 4 HOURS PRN
Status: DISCONTINUED | OUTPATIENT
Start: 2025-04-21 | End: 2025-04-27 | Stop reason: HOSPADM

## 2025-04-21 RX ORDER — HYDROMORPHONE HYDROCHLORIDE 1 MG/ML
0.5 INJECTION, SOLUTION INTRAMUSCULAR; INTRAVENOUS; SUBCUTANEOUS EVERY 5 MIN PRN
Refills: 0 | Status: CANCELLED | OUTPATIENT
Start: 2025-04-21

## 2025-04-21 RX ORDER — INSULIN GLARGINE 100 [IU]/ML
1-50 INJECTION, SOLUTION SUBCUTANEOUS
Status: DISCONTINUED | OUTPATIENT
Start: 2025-04-23 | End: 2025-04-25

## 2025-04-21 RX ORDER — DEXMEDETOMIDINE HYDROCHLORIDE 4 UG/ML
.1-1.5 INJECTION, SOLUTION INTRAVENOUS CONTINUOUS
Status: DISCONTINUED | OUTPATIENT
Start: 2025-04-21 | End: 2025-04-23

## 2025-04-21 RX ORDER — ALBUMIN HUMAN 50 G/1000ML
SOLUTION INTRAVENOUS
Status: DISCONTINUED | OUTPATIENT
Start: 2025-04-21 | End: 2025-04-21 | Stop reason: SDUPTHER

## 2025-04-21 RX ORDER — ONDANSETRON 2 MG/ML
4 INJECTION INTRAMUSCULAR; INTRAVENOUS
Status: CANCELLED | OUTPATIENT
Start: 2025-04-21

## 2025-04-21 RX ORDER — DESMOPRESSIN ACETATE 4 UG/ML
INJECTION, SOLUTION INTRAVENOUS; SUBCUTANEOUS
Status: DISCONTINUED | OUTPATIENT
Start: 2025-04-21 | End: 2025-04-21 | Stop reason: SDUPTHER

## 2025-04-21 RX ORDER — PROCHLORPERAZINE EDISYLATE 5 MG/ML
5 INJECTION INTRAMUSCULAR; INTRAVENOUS
Status: CANCELLED | OUTPATIENT
Start: 2025-04-21

## 2025-04-21 RX ORDER — MAGNESIUM SULFATE HEPTAHYDRATE 40 MG/ML
INJECTION, SOLUTION INTRAVENOUS
Status: DISCONTINUED | OUTPATIENT
Start: 2025-04-21 | End: 2025-04-21 | Stop reason: SDUPTHER

## 2025-04-21 RX ORDER — TRANEXAMIC ACID 100 MG/ML
INJECTION, SOLUTION INTRAVENOUS
Status: DISCONTINUED | OUTPATIENT
Start: 2025-04-21 | End: 2025-04-21 | Stop reason: SDUPTHER

## 2025-04-21 RX ORDER — MIDAZOLAM HYDROCHLORIDE 1 MG/ML
INJECTION, SOLUTION INTRAMUSCULAR; INTRAVENOUS
Status: DISCONTINUED | OUTPATIENT
Start: 2025-04-21 | End: 2025-04-21 | Stop reason: SDUPTHER

## 2025-04-21 RX ORDER — FENTANYL CITRATE 50 UG/ML
INJECTION, SOLUTION INTRAMUSCULAR; INTRAVENOUS
Status: DISCONTINUED | OUTPATIENT
Start: 2025-04-21 | End: 2025-04-21 | Stop reason: SDUPTHER

## 2025-04-21 RX ORDER — POLYETHYLENE GLYCOL 3350 17 G/17G
17 POWDER, FOR SOLUTION ORAL DAILY
Status: DISCONTINUED | OUTPATIENT
Start: 2025-04-21 | End: 2025-04-27 | Stop reason: HOSPADM

## 2025-04-21 RX ORDER — MAGNESIUM SULFATE IN WATER 40 MG/ML
2000 INJECTION, SOLUTION INTRAVENOUS PRN
Status: DISCONTINUED | OUTPATIENT
Start: 2025-04-21 | End: 2025-04-27 | Stop reason: HOSPADM

## 2025-04-21 RX ORDER — HYDRALAZINE HYDROCHLORIDE 20 MG/ML
10 INJECTION INTRAMUSCULAR; INTRAVENOUS ONCE
Status: CANCELLED | OUTPATIENT
Start: 2025-04-21 | End: 2025-04-21

## 2025-04-21 RX ORDER — INSULIN LISPRO 100 [IU]/ML
1-20 INJECTION, SOLUTION INTRAVENOUS; SUBCUTANEOUS
Status: DISCONTINUED | OUTPATIENT
Start: 2025-04-21 | End: 2025-04-23

## 2025-04-21 RX ORDER — PROTAMINE SULFATE 10 MG/ML
INJECTION, SOLUTION INTRAVENOUS
Status: DISCONTINUED | OUTPATIENT
Start: 2025-04-21 | End: 2025-04-21 | Stop reason: SDUPTHER

## 2025-04-21 RX ORDER — CEFAZOLIN SODIUM 1 G/3ML
INJECTION, POWDER, FOR SOLUTION INTRAMUSCULAR; INTRAVENOUS
Status: DISCONTINUED | OUTPATIENT
Start: 2025-04-21 | End: 2025-04-21 | Stop reason: SDUPTHER

## 2025-04-21 RX ORDER — FAMOTIDINE 20 MG/1
20 TABLET, FILM COATED ORAL 2 TIMES DAILY
Status: COMPLETED | OUTPATIENT
Start: 2025-04-21 | End: 2025-04-26

## 2025-04-21 RX ORDER — DEXAMETHASONE SODIUM PHOSPHATE 4 MG/ML
INJECTION, SOLUTION INTRA-ARTICULAR; INTRALESIONAL; INTRAMUSCULAR; INTRAVENOUS; SOFT TISSUE
Status: DISCONTINUED | OUTPATIENT
Start: 2025-04-21 | End: 2025-04-21 | Stop reason: SDUPTHER

## 2025-04-21 RX ORDER — MIDAZOLAM HYDROCHLORIDE 2 MG/2ML
2 INJECTION, SOLUTION INTRAMUSCULAR; INTRAVENOUS PRN
Status: CANCELLED | OUTPATIENT
Start: 2025-04-21

## 2025-04-21 RX ORDER — SODIUM CHLORIDE 9 MG/ML
INJECTION, SOLUTION INTRAVENOUS PRN
Status: CANCELLED | OUTPATIENT
Start: 2025-04-21

## 2025-04-21 RX ORDER — ACETAMINOPHEN 500 MG
1000 TABLET ORAL EVERY 6 HOURS
Status: DISCONTINUED | OUTPATIENT
Start: 2025-04-21 | End: 2025-04-27 | Stop reason: HOSPADM

## 2025-04-21 RX ORDER — INSULIN LISPRO 100 [IU]/ML
0-6 INJECTION, SOLUTION INTRAVENOUS; SUBCUTANEOUS NIGHTLY
Status: DISCONTINUED | OUTPATIENT
Start: 2025-04-23 | End: 2025-04-23

## 2025-04-21 RX ORDER — SODIUM CHLORIDE, SODIUM LACTATE, POTASSIUM CHLORIDE, CALCIUM CHLORIDE 600; 310; 30; 20 MG/100ML; MG/100ML; MG/100ML; MG/100ML
INJECTION, SOLUTION INTRAVENOUS
Status: DISCONTINUED | OUTPATIENT
Start: 2025-04-21 | End: 2025-04-21 | Stop reason: SDUPTHER

## 2025-04-21 RX ORDER — ACETAMINOPHEN 500 MG
1000 TABLET ORAL ONCE
Status: CANCELLED | OUTPATIENT
Start: 2025-04-21 | End: 2025-04-21

## 2025-04-21 RX ORDER — CHLORHEXIDINE GLUCONATE ORAL RINSE 1.2 MG/ML
15 SOLUTION DENTAL 2 TIMES DAILY
Status: DISCONTINUED | OUTPATIENT
Start: 2025-04-21 | End: 2025-04-27 | Stop reason: HOSPADM

## 2025-04-21 RX ORDER — CEFAZOLIN SODIUM 1 G/3ML
INJECTION, POWDER, FOR SOLUTION INTRAMUSCULAR; INTRAVENOUS PRN
Status: DISCONTINUED | OUTPATIENT
Start: 2025-04-21 | End: 2025-04-21 | Stop reason: ALTCHOICE

## 2025-04-21 RX ORDER — LANOLIN ALCOHOL/MO/W.PET/CERES
400 CREAM (GRAM) TOPICAL 2 TIMES DAILY
Status: DISCONTINUED | OUTPATIENT
Start: 2025-04-22 | End: 2025-04-27 | Stop reason: HOSPADM

## 2025-04-21 RX ORDER — FENTANYL CITRATE 50 UG/ML
100 INJECTION, SOLUTION INTRAMUSCULAR; INTRAVENOUS
Refills: 0 | Status: CANCELLED | OUTPATIENT
Start: 2025-04-21

## 2025-04-21 RX ORDER — ASPIRIN 81 MG/1
81 TABLET ORAL DAILY
Status: DISCONTINUED | OUTPATIENT
Start: 2025-04-22 | End: 2025-04-27 | Stop reason: HOSPADM

## 2025-04-21 RX ORDER — GLUCAGON 1 MG/ML
1 KIT INJECTION PRN
Status: DISCONTINUED | OUTPATIENT
Start: 2025-04-21 | End: 2025-04-27 | Stop reason: HOSPADM

## 2025-04-21 RX ORDER — SODIUM CHLORIDE 0.9 % (FLUSH) 0.9 %
5-40 SYRINGE (ML) INJECTION PRN
Status: CANCELLED | OUTPATIENT
Start: 2025-04-21

## 2025-04-21 RX ORDER — LIDOCAINE 4 G/G
2 PATCH TOPICAL DAILY
Status: DISCONTINUED | OUTPATIENT
Start: 2025-04-21 | End: 2025-04-27 | Stop reason: HOSPADM

## 2025-04-21 RX ORDER — DIPHENHYDRAMINE HCL 25 MG
25 CAPSULE ORAL NIGHTLY PRN
Status: DISCONTINUED | OUTPATIENT
Start: 2025-04-22 | End: 2025-04-27 | Stop reason: HOSPADM

## 2025-04-21 RX ORDER — SODIUM CHLORIDE 9 MG/ML
INJECTION, SOLUTION INTRAVENOUS
Status: DISCONTINUED | OUTPATIENT
Start: 2025-04-21 | End: 2025-04-21 | Stop reason: SDUPTHER

## 2025-04-21 RX ORDER — MUPIROCIN 20 MG/G
OINTMENT TOPICAL 2 TIMES DAILY
Status: COMPLETED | OUTPATIENT
Start: 2025-04-21 | End: 2025-04-26

## 2025-04-21 RX ORDER — HYDRALAZINE HYDROCHLORIDE 20 MG/ML
10 INJECTION INTRAMUSCULAR; INTRAVENOUS EVERY 6 HOURS PRN
Status: DISCONTINUED | OUTPATIENT
Start: 2025-04-21 | End: 2025-04-27 | Stop reason: HOSPADM

## 2025-04-21 RX ORDER — SODIUM CHLORIDE 0.9 % (FLUSH) 0.9 %
5-40 SYRINGE (ML) INJECTION EVERY 12 HOURS SCHEDULED
Status: CANCELLED | OUTPATIENT
Start: 2025-04-21

## 2025-04-21 RX ORDER — LIDOCAINE HYDROCHLORIDE AND EPINEPHRINE 10; 10 MG/ML; UG/ML
20 INJECTION, SOLUTION INFILTRATION; PERINEURAL ONCE
Status: COMPLETED | OUTPATIENT
Start: 2025-04-21 | End: 2025-04-21

## 2025-04-21 RX ORDER — SODIUM CHLORIDE 0.9 % (FLUSH) 0.9 %
5-40 SYRINGE (ML) INJECTION PRN
Status: DISCONTINUED | OUTPATIENT
Start: 2025-04-21 | End: 2025-04-27 | Stop reason: HOSPADM

## 2025-04-21 RX ORDER — SODIUM CHLORIDE 450 MG/100ML
INJECTION, SOLUTION INTRAVENOUS CONTINUOUS
Status: DISCONTINUED | OUTPATIENT
Start: 2025-04-21 | End: 2025-04-27 | Stop reason: HOSPADM

## 2025-04-21 RX ORDER — OXYCODONE HYDROCHLORIDE 5 MG/1
10 TABLET ORAL EVERY 4 HOURS PRN
Status: DISCONTINUED | OUTPATIENT
Start: 2025-04-21 | End: 2025-04-27 | Stop reason: HOSPADM

## 2025-04-21 RX ORDER — SODIUM CHLORIDE 9 MG/ML
INJECTION, SOLUTION INTRAVENOUS CONTINUOUS
Status: DISCONTINUED | OUTPATIENT
Start: 2025-04-21 | End: 2025-04-27 | Stop reason: HOSPADM

## 2025-04-21 RX ORDER — ONDANSETRON 2 MG/ML
INJECTION INTRAMUSCULAR; INTRAVENOUS
Status: DISCONTINUED | OUTPATIENT
Start: 2025-04-21 | End: 2025-04-21 | Stop reason: SDUPTHER

## 2025-04-21 RX ORDER — LIDOCAINE HYDROCHLORIDE 10 MG/ML
1 INJECTION, SOLUTION EPIDURAL; INFILTRATION; INTRACAUDAL; PERINEURAL
Status: CANCELLED | OUTPATIENT
Start: 2025-04-21

## 2025-04-21 RX ORDER — IPRATROPIUM BROMIDE AND ALBUTEROL SULFATE 2.5; .5 MG/3ML; MG/3ML
1 SOLUTION RESPIRATORY (INHALATION) EVERY 4 HOURS PRN
Status: DISCONTINUED | OUTPATIENT
Start: 2025-04-21 | End: 2025-04-26 | Stop reason: SDUPTHER

## 2025-04-21 RX ORDER — ONDANSETRON 2 MG/ML
4 INJECTION INTRAMUSCULAR; INTRAVENOUS EVERY 4 HOURS PRN
Status: DISCONTINUED | OUTPATIENT
Start: 2025-04-21 | End: 2025-04-27 | Stop reason: HOSPADM

## 2025-04-21 RX ORDER — SODIUM CHLORIDE, SODIUM LACTATE, POTASSIUM CHLORIDE, CALCIUM CHLORIDE 600; 310; 30; 20 MG/100ML; MG/100ML; MG/100ML; MG/100ML
INJECTION, SOLUTION INTRAVENOUS CONTINUOUS
Status: CANCELLED | OUTPATIENT
Start: 2025-04-21

## 2025-04-21 RX ORDER — AMIODARONE HYDROCHLORIDE 200 MG/1
400 TABLET ORAL 2 TIMES DAILY
Status: DISCONTINUED | OUTPATIENT
Start: 2025-04-22 | End: 2025-04-27 | Stop reason: HOSPADM

## 2025-04-21 RX ORDER — SODIUM CHLORIDE 0.9 % (FLUSH) 0.9 %
5-40 SYRINGE (ML) INJECTION EVERY 12 HOURS SCHEDULED
Status: DISCONTINUED | OUTPATIENT
Start: 2025-04-21 | End: 2025-04-27 | Stop reason: HOSPADM

## 2025-04-21 RX ORDER — HEPARIN SODIUM 1000 [USP'U]/ML
INJECTION, SOLUTION INTRAVENOUS; SUBCUTANEOUS
Status: DISCONTINUED | OUTPATIENT
Start: 2025-04-21 | End: 2025-04-21 | Stop reason: SDUPTHER

## 2025-04-21 RX ORDER — ROCURONIUM BROMIDE 10 MG/ML
INJECTION, SOLUTION INTRAVENOUS
Status: DISCONTINUED | OUTPATIENT
Start: 2025-04-21 | End: 2025-04-21 | Stop reason: SDUPTHER

## 2025-04-21 RX ORDER — PROPOFOL 10 MG/ML
INJECTION, EMULSION INTRAVENOUS
Status: DISCONTINUED | OUTPATIENT
Start: 2025-04-21 | End: 2025-04-21 | Stop reason: SDUPTHER

## 2025-04-21 RX ORDER — OXYCODONE HYDROCHLORIDE 5 MG/1
5 TABLET ORAL EVERY 4 HOURS PRN
Status: DISCONTINUED | OUTPATIENT
Start: 2025-04-21 | End: 2025-04-27 | Stop reason: HOSPADM

## 2025-04-21 RX ORDER — LIDOCAINE HYDROCHLORIDE 20 MG/ML
INJECTION, SOLUTION EPIDURAL; INFILTRATION; INTRACAUDAL; PERINEURAL
Status: DISCONTINUED | OUTPATIENT
Start: 2025-04-21 | End: 2025-04-21 | Stop reason: SDUPTHER

## 2025-04-21 RX ORDER — INSULIN LISPRO 100 [IU]/ML
0-12 INJECTION, SOLUTION INTRAVENOUS; SUBCUTANEOUS
Status: DISCONTINUED | OUTPATIENT
Start: 2025-04-23 | End: 2025-04-27 | Stop reason: HOSPADM

## 2025-04-21 RX ORDER — SENNA AND DOCUSATE SODIUM 50; 8.6 MG/1; MG/1
1 TABLET, FILM COATED ORAL 2 TIMES DAILY
Status: DISCONTINUED | OUTPATIENT
Start: 2025-04-21 | End: 2025-04-27 | Stop reason: HOSPADM

## 2025-04-21 RX ORDER — SODIUM CHLORIDE, SODIUM LACTATE, POTASSIUM CHLORIDE, CALCIUM CHLORIDE 600; 310; 30; 20 MG/100ML; MG/100ML; MG/100ML; MG/100ML
INJECTION, SOLUTION INTRAVENOUS CONTINUOUS
Status: DISCONTINUED | OUTPATIENT
Start: 2025-04-21 | End: 2025-04-21

## 2025-04-21 RX ORDER — GABAPENTIN 100 MG/1
200 CAPSULE ORAL 3 TIMES DAILY
Status: DISCONTINUED | OUTPATIENT
Start: 2025-04-21 | End: 2025-04-27 | Stop reason: HOSPADM

## 2025-04-21 RX ORDER — OXYCODONE HYDROCHLORIDE 5 MG/1
5 TABLET ORAL
Refills: 0 | Status: CANCELLED | OUTPATIENT
Start: 2025-04-21

## 2025-04-21 RX ORDER — PAPAVERINE HYDROCHLORIDE 30 MG/ML
INJECTION INTRAMUSCULAR; INTRAVENOUS PRN
Status: DISCONTINUED | OUTPATIENT
Start: 2025-04-21 | End: 2025-04-21 | Stop reason: ALTCHOICE

## 2025-04-21 RX ORDER — NALOXONE HYDROCHLORIDE 0.4 MG/ML
INJECTION, SOLUTION INTRAMUSCULAR; INTRAVENOUS; SUBCUTANEOUS PRN
Status: CANCELLED | OUTPATIENT
Start: 2025-04-21

## 2025-04-21 RX ORDER — POTASSIUM CHLORIDE 29.8 MG/ML
20 INJECTION INTRAVENOUS PRN
Status: DISCONTINUED | OUTPATIENT
Start: 2025-04-21 | End: 2025-04-27 | Stop reason: HOSPADM

## 2025-04-21 RX ORDER — FENTANYL CITRATE 50 UG/ML
25 INJECTION, SOLUTION INTRAMUSCULAR; INTRAVENOUS EVERY 5 MIN PRN
Refills: 0 | Status: CANCELLED | OUTPATIENT
Start: 2025-04-21

## 2025-04-21 RX ORDER — DEXTROSE MONOHYDRATE 100 MG/ML
INJECTION, SOLUTION INTRAVENOUS CONTINUOUS PRN
Status: DISCONTINUED | OUTPATIENT
Start: 2025-04-21 | End: 2025-04-27 | Stop reason: HOSPADM

## 2025-04-21 RX ORDER — BUPIVACAINE HYDROCHLORIDE 2.5 MG/ML
INJECTION, SOLUTION EPIDURAL; INFILTRATION; INTRACAUDAL; PERINEURAL PRN
Status: DISCONTINUED | OUTPATIENT
Start: 2025-04-21 | End: 2025-04-21 | Stop reason: ALTCHOICE

## 2025-04-21 RX ORDER — NOREPINEPHRINE BITARTRATE 0.06 MG/ML
INJECTION, SOLUTION INTRAVENOUS
Status: DISCONTINUED | OUTPATIENT
Start: 2025-04-21 | End: 2025-04-21 | Stop reason: SDUPTHER

## 2025-04-21 RX ORDER — ONDANSETRON 2 MG/ML
4 INJECTION INTRAMUSCULAR; INTRAVENOUS ONCE
Status: COMPLETED | OUTPATIENT
Start: 2025-04-21 | End: 2025-04-21

## 2025-04-21 RX ORDER — ATORVASTATIN CALCIUM 40 MG/1
40 TABLET, FILM COATED ORAL NIGHTLY
Status: DISCONTINUED | OUTPATIENT
Start: 2025-04-21 | End: 2025-04-22

## 2025-04-21 RX ORDER — ALBUMIN HUMAN 50 G/1000ML
SOLUTION INTRAVENOUS
Status: DISPENSED
Start: 2025-04-21 | End: 2025-04-22

## 2025-04-21 RX ADMIN — GABAPENTIN 300 MG: 300 CAPSULE ORAL at 08:49

## 2025-04-21 RX ADMIN — DEXAMETHASONE SODIUM PHOSPHATE 8 MG: 4 INJECTION INTRA-ARTICULAR; INTRALESIONAL; INTRAMUSCULAR; INTRAVENOUS; SOFT TISSUE at 10:49

## 2025-04-21 RX ADMIN — SODIUM CHLORIDE: 0.9 INJECTION, SOLUTION INTRAVENOUS at 20:38

## 2025-04-21 RX ADMIN — Medication 8 MCG: at 14:56

## 2025-04-21 RX ADMIN — PHENYLEPHRINE HYDROCHLORIDE 80 MCG: 10 INJECTION INTRAVENOUS at 14:02

## 2025-04-21 RX ADMIN — SODIUM CHLORIDE: 0.45 INJECTION, SOLUTION INTRAVENOUS at 20:35

## 2025-04-21 RX ADMIN — CALCIUM CHLORIDE 1000 MG: 100 INJECTION, SOLUTION INTRAVENOUS at 19:08

## 2025-04-21 RX ADMIN — SODIUM CHLORIDE, POTASSIUM CHLORIDE, SODIUM LACTATE AND CALCIUM CHLORIDE: 600; 310; 30; 20 INJECTION, SOLUTION INTRAVENOUS at 10:24

## 2025-04-21 RX ADMIN — ACETAMINOPHEN 1000 MG: 500 TABLET, FILM COATED ORAL at 08:49

## 2025-04-21 RX ADMIN — SODIUM CHLORIDE: 0.45 INJECTION, SOLUTION INTRAVENOUS at 16:23

## 2025-04-21 RX ADMIN — LIDOCAINE HYDROCHLORIDE 100 MG: 20 INJECTION, SOLUTION EPIDURAL; INFILTRATION; INTRACAUDAL; PERINEURAL at 10:35

## 2025-04-21 RX ADMIN — 0.12% CHLORHEXIDINE GLUCONATE 15 ML: 1.2 RINSE ORAL at 19:52

## 2025-04-21 RX ADMIN — SODIUM CHLORIDE: 9 INJECTION, SOLUTION INTRAVENOUS at 10:24

## 2025-04-21 RX ADMIN — MUPIROCIN: 20 OINTMENT TOPICAL at 07:44

## 2025-04-21 RX ADMIN — MIDAZOLAM 3 MG: 1 INJECTION INTRAMUSCULAR; INTRAVENOUS at 09:31

## 2025-04-21 RX ADMIN — DEXMEDETOMIDINE 0.3 MCG/KG/HR: 100 INJECTION, SOLUTION INTRAVENOUS at 10:34

## 2025-04-21 RX ADMIN — Medication 5 MCG/MIN: at 14:29

## 2025-04-21 RX ADMIN — FAMOTIDINE 20 MG: 10 INJECTION, SOLUTION INTRAVENOUS at 19:51

## 2025-04-21 RX ADMIN — SODIUM CHLORIDE: 0.9 INJECTION, SOLUTION INTRAVENOUS at 16:34

## 2025-04-21 RX ADMIN — MAGNESIUM SULFATE IN WATER 2000 MG: 40 INJECTION, SOLUTION INTRAVENOUS at 14:13

## 2025-04-21 RX ADMIN — SUGAMMADEX 200 MG: 100 INJECTION, SOLUTION INTRAVENOUS at 15:38

## 2025-04-21 RX ADMIN — SODIUM CHLORIDE, SODIUM LACTATE, POTASSIUM CHLORIDE, CALCIUM CHLORIDE: 600; 310; 30; 20 INJECTION, SOLUTION INTRAVENOUS at 10:24

## 2025-04-21 RX ADMIN — PHENYLEPHRINE HYDROCHLORIDE 40 MCG: 10 INJECTION INTRAVENOUS at 11:33

## 2025-04-21 RX ADMIN — FENTANYL CITRATE 150 MCG: 50 INJECTION INTRAMUSCULAR; INTRAVENOUS at 10:35

## 2025-04-21 RX ADMIN — TRANEXAMIC ACID 1 MG/KG/HR: 100 INJECTION, SOLUTION INTRAVENOUS at 10:35

## 2025-04-21 RX ADMIN — DESMOPRESSIN ACETATE 33 MCG: 4 INJECTION, SOLUTION INTRAVENOUS; SUBCUTANEOUS at 14:12

## 2025-04-21 RX ADMIN — Medication 8 MCG: at 14:40

## 2025-04-21 RX ADMIN — ONDANSETRON 4 MG: 2 INJECTION INTRAMUSCULAR; INTRAVENOUS at 19:50

## 2025-04-21 RX ADMIN — ACETAMINOPHEN 1000 MG: 500 TABLET, FILM COATED ORAL at 17:57

## 2025-04-21 RX ADMIN — WATER 1000 MG: 1 INJECTION INTRAMUSCULAR; INTRAVENOUS; SUBCUTANEOUS at 17:29

## 2025-04-21 RX ADMIN — Medication 8 MCG: at 14:28

## 2025-04-21 RX ADMIN — ALBUMIN (HUMAN) 12.5 G: 12.5 INJECTION, SOLUTION INTRAVENOUS at 14:25

## 2025-04-21 RX ADMIN — FENTANYL CITRATE 100 MCG: 50 INJECTION INTRAMUSCULAR; INTRAVENOUS at 09:31

## 2025-04-21 RX ADMIN — ALBUMIN (HUMAN) 12.5 G: 12.5 INJECTION, SOLUTION INTRAVENOUS at 14:32

## 2025-04-21 RX ADMIN — PHENYLEPHRINE HYDROCHLORIDE 120 MCG: 10 INJECTION INTRAVENOUS at 10:37

## 2025-04-21 RX ADMIN — FENTANYL CITRATE 150 MCG/HR: 50 INJECTION, SOLUTION INTRAMUSCULAR; INTRAVENOUS at 10:35

## 2025-04-21 RX ADMIN — SODIUM CHLORIDE 6.8 UNITS/HR: 9 INJECTION, SOLUTION INTRAVENOUS at 02:16

## 2025-04-21 RX ADMIN — SODIUM CHLORIDE, PRESERVATIVE FREE 10 ML: 5 INJECTION INTRAVENOUS at 19:52

## 2025-04-21 RX ADMIN — Medication 5.9 UNITS/HR: at 20:42

## 2025-04-21 RX ADMIN — HEPARIN SODIUM 44000 UNITS: 1000 INJECTION INTRAVENOUS; SUBCUTANEOUS at 12:27

## 2025-04-21 RX ADMIN — LIDOCAINE HYDROCHLORIDE,EPINEPHRINE BITARTRATE 20 ML: 10; .01 INJECTION, SOLUTION INFILTRATION; PERINEURAL at 16:49

## 2025-04-21 RX ADMIN — POTASSIUM CHLORIDE 20 MEQ: 29.8 INJECTION INTRAVENOUS at 21:48

## 2025-04-21 RX ADMIN — NITROGLYCERIN 1 INCH: 20 OINTMENT TOPICAL at 05:55

## 2025-04-21 RX ADMIN — MIDAZOLAM 2 MG: 1 INJECTION INTRAMUSCULAR; INTRAVENOUS at 10:20

## 2025-04-21 RX ADMIN — PHENYLEPHRINE HYDROCHLORIDE 40 MCG/MIN: 10 INJECTION INTRAVENOUS at 10:33

## 2025-04-21 RX ADMIN — TRANEXAMIC ACID 1000 MG: 100 INJECTION, SOLUTION INTRAVENOUS at 10:45

## 2025-04-21 RX ADMIN — ROCURONIUM BROMIDE 30 MG: 10 INJECTION INTRAVENOUS at 12:58

## 2025-04-21 RX ADMIN — METOPROLOL SUCCINATE 100 MG: 50 TABLET, EXTENDED RELEASE ORAL at 07:43

## 2025-04-21 RX ADMIN — 0.12% CHLORHEXIDINE GLUCONATE 15 ML: 1.2 RINSE ORAL at 07:44

## 2025-04-21 RX ADMIN — SODIUM CHLORIDE 3.8 UNITS/HR: 9 INJECTION, SOLUTION INTRAVENOUS at 10:35

## 2025-04-21 RX ADMIN — PROTAMINE SULFATE 340 MG: 10 INJECTION, SOLUTION INTRAVENOUS at 14:01

## 2025-04-21 RX ADMIN — PHENYLEPHRINE HYDROCHLORIDE 120 MCG: 10 INJECTION INTRAVENOUS at 14:18

## 2025-04-21 RX ADMIN — ROCURONIUM BROMIDE 100 MG: 10 INJECTION INTRAVENOUS at 10:35

## 2025-04-21 RX ADMIN — CEFAZOLIN 2 G: 1 INJECTION, POWDER, FOR SOLUTION INTRAMUSCULAR; INTRAVENOUS; PARENTERAL at 11:04

## 2025-04-21 RX ADMIN — PHENYLEPHRINE HYDROCHLORIDE 40 MCG: 10 INJECTION INTRAVENOUS at 11:36

## 2025-04-21 RX ADMIN — HYDROMORPHONE HYDROCHLORIDE 0.5 MG: 1 INJECTION, SOLUTION INTRAMUSCULAR; INTRAVENOUS; SUBCUTANEOUS at 21:45

## 2025-04-21 RX ADMIN — PHENYLEPHRINE HYDROCHLORIDE 120 MCG: 10 INJECTION INTRAVENOUS at 14:26

## 2025-04-21 RX ADMIN — ONDANSETRON 4 MG: 2 INJECTION, SOLUTION INTRAMUSCULAR; INTRAVENOUS at 15:03

## 2025-04-21 RX ADMIN — PROPOFOL 130 MG: 10 INJECTION, EMULSION INTRAVENOUS at 10:35

## 2025-04-21 RX ADMIN — GABAPENTIN 200 MG: 100 CAPSULE ORAL at 23:15

## 2025-04-21 RX ADMIN — CEFAZOLIN 2 G: 1 INJECTION, POWDER, FOR SOLUTION INTRAMUSCULAR; INTRAVENOUS; PARENTERAL at 15:04

## 2025-04-21 RX ADMIN — ARFORMOTEROL TARTRATE: 15 SOLUTION RESPIRATORY (INHALATION) at 20:20

## 2025-04-21 RX ADMIN — DOXYCYCLINE 100 MG: 100 INJECTION, POWDER, LYOPHILIZED, FOR SOLUTION INTRAVENOUS at 17:46

## 2025-04-21 RX ADMIN — SODIUM CHLORIDE, SODIUM LACTATE, POTASSIUM CHLORIDE, AND CALCIUM CHLORIDE: .6; .31; .03; .02 INJECTION, SOLUTION INTRAVENOUS at 08:50

## 2025-04-21 RX ADMIN — MUPIROCIN: 20 OINTMENT TOPICAL at 20:58

## 2025-04-21 ASSESSMENT — PAIN DESCRIPTION - PAIN TYPE
TYPE: SURGICAL PAIN

## 2025-04-21 ASSESSMENT — PAIN DESCRIPTION - DESCRIPTORS
DESCRIPTORS: ACHING

## 2025-04-21 ASSESSMENT — PAIN DESCRIPTION - LOCATION
LOCATION: INCISION

## 2025-04-21 ASSESSMENT — PAIN SCALES - GENERAL
PAINLEVEL_OUTOF10: 0
PAINLEVEL_OUTOF10: 2
PAINLEVEL_OUTOF10: 0
PAINLEVEL_OUTOF10: 1
PAINLEVEL_OUTOF10: 0
PAINLEVEL_OUTOF10: 7
PAINLEVEL_OUTOF10: 0

## 2025-04-21 ASSESSMENT — PAIN DESCRIPTION - ORIENTATION
ORIENTATION: MID
ORIENTATION: MID

## 2025-04-21 ASSESSMENT — PAIN - FUNCTIONAL ASSESSMENT
PAIN_FUNCTIONAL_ASSESSMENT: ACTIVITIES ARE NOT PREVENTED

## 2025-04-21 ASSESSMENT — PULMONARY FUNCTION TESTS: PIF_VALUE: 23

## 2025-04-21 NOTE — ANESTHESIA PROCEDURE NOTES
Arterial Line:    An arterial line was placed using surface landmarks, in the pre-op for the following indication(s): continuous blood pressure monitoring and blood sampling needed.    A 20 gauge (size) (length), Arrow (type) catheter was placed, Seldinger technique used, into the right radial artery, secured by Tegaderm.  Anesthesia type: Local  Local infiltration: Injection    Events:  patient tolerated procedure well with no complications.    Additional notes:  Assisted by SRNA4/21/2025 9:39 AM  Anesthesiologist: Yony Culver MD  Performed: Anesthesiologist   Preanesthetic Checklist  Completed: patient identified, IV checked, site marked, risks and benefits discussed, surgical/procedural consents, equipment checked, pre-op evaluation, timeout performed, anesthesia consent given, oxygen available, monitors applied/VS acknowledged and fire risk safety assessment completed and verbalized

## 2025-04-21 NOTE — PERIOP NOTE
Dr. DWAYNE Block in room 2213 assessing patients left face/jaw.  Dr. Loco is ok to proceed with surgery.

## 2025-04-21 NOTE — PERIOP NOTE
TRANSFER - IN REPORT:    Verbal report received from NEHA Ignacio on Michael Fournier  being received from [facility] for ordered procedure      Report consisted of patient's Situation, Background, Assessment and   Recommendations(SBAR).     Information from the following report(s) Nurse Handoff Report, Index, Intake/Output, MAR, Recent Results, and Cardiac Rhythm NSR  was reviewed with the receiving nurse.    Opportunity for questions and clarification was provided.      Assessment completed upon patient's arrival to unit and care assumed.

## 2025-04-21 NOTE — ANESTHESIA PROCEDURE NOTES
Procedure Performed: LATOYA       Start Time:  4/21/2025 2:26 PM       End Time:      Anesthesia Information  Performed Personally  Anesthesiologist:  Yony Culver MD        Preanesthesia Checklist:  Patient identified, IV assessed, risks and benefits discussed, monitors and equipment assessed, procedure being performed at surgeon's request and anesthesia consent obtained.    General Procedure Information  Diagnostic Indications for Echo:  assessment of ascending aorta, assessment of surgical repair, hemodynamic monitoring and assessment of valve function  Location performed:  OR  Intubated  Bite block placed  Heart visualized  Probe Insertion:  Easy  Probe Type:  3D, mulitplane, epiaortic and biplane  Modalities:  2D, 3D, color flow mapping, continuous wave Doppler, pulse wave Doppler and M-mode    Echocardiographic and Doppler Measurements    Ventricles    Ventricle  Cavity Size  Cavity          Dimension  Hypertrophy  Thrombus  Global FXN  EF    RV  normal    No  No  normal      LV  normal    No  No  normal (50-70%)         Ventricular Regional Function:  1- Basal Anteroseptal:  normal  2- Basal Anterior:  normal  3- Basal Anterolateral:  normal  4- Basal Inferolateral:  normal  5- Basal Inferior:  normal  6- Basal Inferoseptal:  normal  7- Mid Anteroseptal:  normal  8- Mid Anterior:  normal  9- Mid Anterolateral:  normal  10- Mid Inferolateral:  normal  11- Mid Inferior:  normal  12- Mid Inferoseptal:  normal  13- Apical Anterior:  normal  14- Apical Lateral:  normal  15- Apical Inferior:  normal  16- Apical Septal:  normal  17- [place]:  normal    Ventricular Wall Motion Comments:  No WMA    Valves     Valves  Annulus  Stenosis Measurements   Regurg  Leaflet   Morph  Leaflet   Motion Valve Comments    Aortic normal Stenosis none.            none   normal normal Tri-leaflet AV with no stenosis and no AI.      Mitral normal none             trace normal normal Normal appearing MV.   Tricuspid normal   not

## 2025-04-21 NOTE — ANESTHESIA POSTPROCEDURE EVALUATION
Post-Anesthesia Evaluation and Assessment    Patient: Michael Fournier MRN: [id number]  SSN: [id number]    YOB: 1980  Age: 45 y.o.  Sex: male      I have evaluated the patient and they are stable in the ICU.     Cardiovascular Function/Vital Signs  Visit Vitals  BP (!) 113/59   Pulse 76   Temp 98.7 °F (37.1 °C) (Oral)   Resp 14   Ht 1.905 m (6' 3\")   Wt 109.8 kg (242 lb 1 oz)   SpO2 97%   BMI 30.26 kg/m²       Patient is status post General anesthesia for Procedure(s):  ON-PUMP CORONARY ARTERY BYPASS GRAFTING TIMES TWO WITH LEFT INTERNAL MAMMARY ARTERY, ENDOSCOPIC HARVESTING OF THE RIGHT GREATER SAPHENOUS VEIN, AND LEFT ATRIAL APPENDAGE LIGATION (E.R.A.S.). LATOYA BY DR. HAUSER..    Nausea/Vomiting: None    Postoperative hydration reviewed and adequate.    Pain:  Managed    Neurological Status:   Intubated and sedated     Pulmonary Status:   Intubated with adequate ventilation and oxygenation     Complications related to anesthesia: None    Post-anesthesia assessment completed. No concerns    Signed By: Yony Hauser MD     April 21, 2025

## 2025-04-21 NOTE — BRIEF OP NOTE
BRIEF OP NOTE  Pre-Op Diagnosis: Coronary artery disease, unspecified vessel or lesion type, unspecified whether angina present, unspecified whether native or transplanted heart [I25.10]    Post-Op Diagnosis: Coronary artery disease, unspecified vessel or lesion type, unspecified whether angina present, unspecified whether native or transplanted heart [I25.10]      Procedure: Procedure(s):  CABG X 2 LIMA to LAD, RSVG to PDA  RIGHT ENDOSCOPIC SAPHENOUS VEIN HARVEST  LEFT ATRIAL APPENDAGE LIGATION    Surgeon:  Dr. Elizabeth MD    Assistant(s): Yony Singer PA-C    Anesthesia: General      Infusions:  levo, Javi, Precedex, Insulin     Estimated Blood Loss: 200 ml     Cell Saver: 475 ml     Bypass Time: 63 min     Cross Clamp Time: 47 min    Specimens: * No specimens in log *    Drains and pacing wires: 1 Bipolar Ventricular Wire 2 32 Fr Drains, (1 Mediastinal, 1 L Pleural)     Wires completed by: Luz Singer PA-C    Sternal incision closed by: Yony Singer PA-C    Leg incision closed by: Yony Singer PA-C    Complications: none    Findings: See full operative note.    Implants:   Implant Name Type Inv. Item Serial No.  Lot No. LRB No. Used Action   APPLIER CLIP MED SUTURE LESS 45 MM SYS 1 HND STRL ATRICLIP FLX V - SNA Cardiovascular occluders APPLIER CLIP MED SUTURE LESS 45 MM SYS 1 HND STRL ATRICLIP FLX V NA [employer]WD [id number] Left 1 Implanted

## 2025-04-21 NOTE — ANESTHESIA PRE PROCEDURE
Date/Time    PROTIME 11.6 04/14/2025 11:03 AM    INR 1.1 04/14/2025 11:03 AM    APTT 30.1 04/12/2025 09:31 AM       HCG (If Applicable): No results found for: \"PREGTESTUR\", \"PREGSERUM\", \"HCG\", \"HCGQUANT\"     ABGs:   Lab Results   Component Value Date/Time    PHART 7.43 04/14/2025 02:36 PM    PO2ART 84 04/14/2025 02:36 PM    XPE3OJF 31 04/14/2025 02:36 PM    EQA1BKG 20 04/14/2025 02:36 PM        Type & Screen (If Applicable):  Lab Results   Component Value Date    ABORH B POSITIVE 04/20/2025    LABANTI NEG 04/20/2025       Drug/Infectious Status (If Applicable):  No results found for: \"HIV\", \"HEPCAB\"    COVID-19 Screening (If Applicable):   Lab Results   Component Value Date/Time    COVID19 Not detected 07/14/2023 04:38 PM           Anesthesia Evaluation  Patient summary reviewed and Nursing notes reviewed  Airway: Mallampati: II  TM distance: >3 FB   Neck ROM: full  Mouth opening: > = 3 FB   Dental: normal exam         Pulmonary: breath sounds clear to auscultation  (+)           asthma:                            Cardiovascular:  Exercise tolerance: poor (<4 METS)  (+) hypertension:, angina:, past MI:, CAD:        Rhythm: regular  Rate: normal                 ROS comment: ·  Left Ventricle: Normal left ventricular systolic function with a visually estimated EF of 55 - 60%. Left ventricle size is normal. Normal wall thickness. Normal wall motion.  ·  Right Ventricle: Right ventricle size is normal. Normal systolic function.  ·  Image quality is good.       Neuro/Psych:   Negative Neuro/Psych ROS              GI/Hepatic/Renal: Neg GI/Hepatic/Renal ROS            Endo/Other:    (+) Diabetes.                 Abdominal: normal exam            Vascular: negative vascular ROS.         Other Findings:             Anesthesia Plan      general     ASA 4       Induction: intravenous.  arterial line, central line, BIS, CVP and LATOYA  MIPS: Postoperative opioids intended, Prophylactic antiemetics administered and Postoperative

## 2025-04-21 NOTE — OP NOTE
atriclip.  The clip was passed on the field and deployed over the left atrial appendage.  The last distal anastomosis was LIMA to LAD.  The distal LAD is 1.5 mm in size and heavily diseased.  It is performed with 7-0 Prolene running suture end-to-side anastomosis.  At this point, the proximals was performed with single cross-clamp, 4.8 mm punch and  6-0 Prolene running suture end-to-side anastomosis.  The patient was then placed in steep Trendelenburg and a Valsalva was given per anesthesia.  A hotshot cardioplegia was given retrograde followed by 500 mL of warm blood.  The clamp was then removed and the heart was started in sinus rhythm.  All anastomosis was then checked for hemostasis.  The patient was then weaned from cardiopulmonary bypass while looking at the LATOYA images.  The left and right ventricle appeared to be working well.  So, the patient was  from cardiopulmonary bypass.  The left atrial appendage is not visible on the LATOYA after the clipping.  At this point, the protamine is given, the cannulas are removed.  Two  32-Turkmen chest tube are placed one in the Left  pleural space and a 32-Turkmen chest tube is placed anteriorly in the mediastinum. Pacing wires were placed on the RV and brought through a separate stab incision.     The patient's chest was then closed with #6 chest wires and the skin and subcutaneous layers closed in layers.    Electronically signed by  Cheng Johnson MD    Date: 4/21/2025  Time: 4:50 PM

## 2025-04-21 NOTE — PROCEDURES
PROCEDURE: Lancing of boil/abscess in L submandibular region  DETAILS:  It was felt that there was an opportunity to perform this procedure while he was still intubated and sedated in the immediate post op interval. The area was cleaned thoroughly with chlorhexidine sticks X 2. Sterile gloves were donned and the area was anesthetized with 1% lidocaine - initially superficially with a 23g needle and then more deeply with an 18g needle. Using the 18g needle, the pus pocket was entered. After complete anesthesia was achieved, a #11 scalpel was used to open the abscess and approximately 10 cc of very thick pus was expressed which was minimally bloody. When it was felt that all of the pus was expressed from the cavity approximately 5 cm of packing tape was advanced into the cavity through the incision made with the scalpel. The patient tolerated the procedure well. Since a specimen had already been sent, no further specimens were obtained.    Manuel Morrissey MD  [facility]  [facility] 4th floor [facility] phone: [phone]  [facility] 7th floor [facility] phone: [phone]  [facility] phone: [phone]  4/21/2025

## 2025-04-21 NOTE — ANESTHESIA PROCEDURE NOTES
Central Venous Line:    A central venous line was placed using ultrasound guidance and surface landmarks, in the pre-op for the following indication(s): central venous access and CVP monitoring.4/21/2025 9:40 AM    Sterility preparation included the following: provider used sterile gloves, gown, hat and mask, hand hygiene performed prior to central venous catheter insertion, sterile gel and probe cover used in ultrasound-guided central venous catheter insertion and maximum sterile barriers used during central venous catheter insertion.    The patient was placed in Trendelenburg position.The right internal jugular vein was prepped.    The site was prepped with Chloraprep.  A 9 Fr (size), 12 (length), introducer double lumen was placed.    During the procedure, the following specific steps were taken: target vein identified, needle advanced into vein and blood aspirated and guidewire advanced into vein.    Intravenous verification was obtained by ultrasound, venous blood return and manometry.    Post insertion care included: all ports aspirated, all ports flushed easily, guidewire removed intact, Biopatch applied, line sutured in place and dressing applied.    During the procedure the patient experienced: patient tolerated procedure well with no complications.      Outcomes: uncomplicated  Anesthesia type: local..No    Additional notes:  Assisted by SRNA  Staffing  Performed: Anesthesiologist   Anesthesiologist: Yony Culver MD  Performed by: Yony Culver MD  Authorized by: Yony Culver MD    Preanesthetic Checklist  Completed: patient identified, IV checked, site marked, risks and benefits discussed, surgical/procedural consents, equipment checked, pre-op evaluation, timeout performed, anesthesia consent given, oxygen available, monitors applied/VS acknowledged and fire risk safety assessment completed and verbalized

## 2025-04-21 NOTE — PERIOP NOTE
1138 - Time out including but not limited to the following  Beta blocker given at 0743  2g Ancef running  Blood products and cardioplegia in the room.     1430 - Rewarming notified to Arline SOLOMON    1415 - TRANSFER - OUT REPORT:    Verbal report given to Arabella SOLOMON on Michael Fournier  being transferred to CVICU for routine post-op       Report consisted of patient's Situation, Background, Assessment and   Recommendations(SBAR).     Information from the following report(s) Surgery Report was reviewed with the receiving nurse.           Lines:   Peripheral IV 04/11/25 Left Antecubital (Active)   Site Assessment Clean, dry & intact 04/21/25 0837   Line Status Infusing;Flushed 04/21/25 0837   Line Care Connections checked and tightened 04/21/25 0837   Phlebitis Assessment No symptoms 04/21/25 0837   Infiltration Assessment 0 04/21/25 0837   Alcohol Cap Used Yes 04/21/25 0441   Dressing Status Clean, dry & intact 04/21/25 0837   Dressing Type Transparent 04/21/25 0837       Peripheral IV 04/18/25 Posterior;Right Forearm (Active)   Site Assessment Clean, dry & intact 04/21/25 0837   Line Status Infusing 04/21/25 0837   Line Care Connections checked and tightened 04/21/25 0837   Phlebitis Assessment No symptoms 04/21/25 0837   Infiltration Assessment 0 04/21/25 0837   Alcohol Cap Used Yes 04/21/25 0441   Dressing Status Clean, dry & intact 04/21/25 0837   Dressing Type Transparent 04/21/25 0837       Arterial Line 04/21/25 Radial (Active)       Introducer 04/21/25 (Active)       Pacing Wires (Active)   Pacing Wire Status Ventricular wires connected to pacer 04/21/25 1422   Site Assessment Clean, dry & intact 04/21/25 1422   Pacer Wire Care Completed Yes 04/21/25 1422   Dressing Status New dressing applied 04/21/25 1422   Dressing Intervention New 04/21/25 1422        Opportunity for questions and clarification was provided.      Patient transported with:  Monitor, O2 @ 10 lpm, and Registered Nurse  MICHAEL and AMOR Walton,

## 2025-04-21 NOTE — CONSULTS
Hematology / Oncology ([facility])     Admit date: 4/11/2025   Subjective:     Michael Fournier is a 45 y.o. scheduled for CABG on Monday.  He had an FNA of a neck mass that appears to be an abscess.  No malignant cells were identified.      The cytology report noted: \"While the leading differential diagnosis is an inflamed squamous cyst and   scattered squamous cells show no overt signs of malignancy, a well-differentiated squamous cell carcinoma cannot be excluded definitively on aspirates alone.\"  As a result, oncology was consulted for further evaluation    Complete ROS is notable for    Patient Active Problem List    Diagnosis Date Noted    Type 2 diabetes mellitus with hyperglycemia, with long-term current use of insulin ([facility]) 04/17/2025    Bilateral carotid artery stenosis 04/14/2025    Preop cardiovascular exam 04/14/2025    Unstable angina ([facility]) 04/11/2025    Coronary artery disease of native artery of native heart with stable angina pectoris 06/25/2024    STEMI (ST elevation myocardial infarction) ([facility]) 10/29/2023    Dyslipidemia (high LDL; low HDL) 10/02/2023    Erectile dysfunction due to diseases classified elsewhere 10/02/2023    Essential hypertension, malignant 10/02/2023    Hyperlipidemia 10/02/2023    S/P angioplasty with stent 10/02/2023    Acute chest pain 09/27/2023    Acute coronary syndrome ([facility]) 06/17/2023    Acute pancreatitis 10/27/2022     Past Medical History:   Diagnosis Date    Asthma     Diabetes ([facility])     Gall stone 2022    Hemorrhoid     Hypertension     Melanoma ([facility]) 2010    STOMACH      Past Surgical History:   Procedure Laterality Date    CARDIAC CATHETERIZATION  08/07/2024    stent and  [facility]    CARDIAC PROCEDURE N/A 4/14/2025    Left heart cath / coronary angiography performed by Bernardino Vargas MD at [facility] CARDIAC CATH LAB    CHOLECYSTECTOMY  12/06/2022    Dr Cedric Wilson    COLONOSCOPY N/A 06/23/2022    COLONOSCOPY performed by Facundo Sandy MD at [facility] ENDOSCOPY    
IR consult, order added after Radiologist reviewed.  
This is a 45-year-old male with a history of type 2 diabetes mellitus, coronary artery disease status post MI x 3 with multiple PCI, and hypertension who presented to [facility] on 4/11/2025 with complaints of substernal chest pain.  Admission labs notable for glucose 245, A1c 10.3%, creatinine 0.92, bicarb 24, normal troponins.  He was transferred to [facility] where he underwent repeat cardiac catheterization revealing multivessel disease.  He did receive multiple doses of prednisone for dye allergy.  He is now admitted in anticipation of CABG on Monday.  We are asked to assist in glucose management.    Patient tells me that he has had diabetes for 10 years and for the most part has been on Lantus and Humalog.  [facility] by [facility]inology and their most recent note indicates a regimen of 50 units of Lantus, Humalog 4 units with meals, Farxiga 5 mg and Trulicity 0.75 mg weekly.  He claims adherence to the medical regimen.  He tells me today that he previously drank a lot of sodas but within the last month has switched to 0 sugar sodas.  He lives with his mother who also has diabetes.    Examination  Blood pressure 119/80  Pulse 69  Afebrile  99% on room air  Weight 108 kg    Recent laboratory data  Glucose 275 (range 227-368)  Creatinine 0.68  Bicarb 20  Sodium 133    Impression  1.  Type 2 diabetes mellitus with poor blood sugar control  2.  Coronary artery disease with multivessel disease  Plan:  1.  We will increase the glargine to 40 units daily  2.  We will increase mealtime insulin to 10 units with meals  3.  We will continue high dose correction  4. We will follow with you    Before making these care recommendations, I personally reviewed the hospitalization record, including notes, laboratory & diagnostic data and current medications, and examined the patient at the bedside. Total time  40   minutes,.      Please note that this dictation was completed with 
[facility]   History and Physical    Subjective:      Michael Fournier is a 45 y.o. male with a past medical hx of CAD s/p stents in August at [facility] (KATALINA to OM1 and Ostial RCA angioplasty), HTN, HLD, DMII, asthma, active tobacco abuse who was referred for cardiac evaluation by Dr. Vargas for MVCAD.     Patient presented to the [facility] ED 4/11/25 with c/o pounding CP to the left side of his chest that radiated upward, c/w prior MI. He tried nitro at home with some improvement but then pain returned.     In the ER, patient was hypertensive and tachycardic. Initial troponin was 43. CBC and CBC unremarkable aside from a glucose of 245. He was given ASA and nitro. CXR showed no acute process. EKG showed NSR. He was admitted to [facility] stepdown unit. Cardiology was consulted and plans made for cardiac cath after the weekend. Patient received cath this AM, with results as below and for which we were consulted.     In addition to above, states that he has been noticing increasing SOB since March with activity like showering. Energy levels fluctuate off and on. Reports some mild, transient dizziness with above episode and reports that he occasionally experiences PND for which he has been taking nitro. Denies orthopnea, LE edema, and syncope. Weight has changed from 370 lbs to 240 lbs in a year- patient attributes this to medication changes.     He denies any current CP or SOB.     Past medical/surgical history positive for lung disease (asthma) and diabetes (DMII). Denies CVA/TIA, difficulty swallowing, JARRETT , thyroid disease, dysrhythmias, prior PNA, previous thoracic surgery, trauma or radiation to chest wall, kidney disease, liver disease, prostate disease, testosterone or other hormonal use, PAD/PVD, vein stripping, issues with bleeding or blood clots, hospitalizations for HF within the past year, and previous valve replacements or PPM. Does have a history of hemorrhoids.     Social history positive for tobacco use (smokes 
24 hours ending 04/12/25 0839   Patient Vitals for the past 96 hrs (Last 3 readings):   Weight   04/12/25 0640 108.7 kg (239 lb 10.2 oz)   04/11/25 2156 108.7 kg (239 lb 10.2 oz)       General Appearance: Well developed, well nourished, alert & oriented x 3,    no acute distress.  Ears/Nose/Mouth/Throat: Pupils equal and round, Hearing grossly normal.  Neck: Supple.  JVP within normal limits. Carotids good upstrokes, with no bruit.  Chest: Lungs clear to auscultation bilaterally.  Cardiovascular: Regular rate and rhythm, S1S2 normal, no murmur, rubs, gallops.  Abdomen: Soft, non-tender, bowel sounds are active. No organomegaly.  Extremities: No edema bilaterally. Femoral pulses +2, Distal Pulses +1.  Skin: Warm and dry.  Neuro: CN II-XII grossly intact, Strength and sensation grossly intact.    Data:      Recent Labs     04/11/25  1328 04/12/25  0450   WBC 10.2 10.1   HGB 15.8 14.3   HCT 44.9 42.9    223     No results for input(s): \"INR\", \"APTT\" in the last 72 hours.    Invalid input(s): \"PTP\"   Recent Labs     04/11/25  1328 04/12/25  0450    137   K 3.6 3.6    107   CO2 24 26   BUN 9 8   CREATININE 0.92 0.59*     Recent Labs     04/11/25  1328 04/11/25  1517 04/12/25  0450   TROPHS 43 42 103*     No results for input(s): \"CHOL\", \"CHLST\", \"CHOLV\", \"HDL\", \"HDLC\", \"LDL\", \"LDLC\" in the last 72 hours.    Invalid input(s): \"CHOLX\", \"CHOLP\", \"HDLP\", \"DLDL\", \"DLDLP\", \"TGL\", \"TGLX\", \"TRIGL\", \"TRIGP\", \"CHHD\", \"CHHDX\"    Recent Labs     04/11/25  1328   GLOB 4.4*     No results for input(s): \"PH\", \"PCO2\", \"PO2\" in the last 72 hours.  No results found for this or any previous visit (from the past 4464 hours).   No results found for this or any previous visit.    Prior to Admission medications    Medication Sig Start Date End Date Taking? Authorizing Provider   metoprolol succinate (TOPROL XL) 100 MG extended release tablet Take 1 tablet by mouth daily    Provider, MD Chip   isosorbide mononitrate 
preparedness to intervene urgently. I participated in the decision-making and personally managed or directed the management of the following life and organ supporting interventions that required my frequent assessment to treat or prevent imminent deterioration.    I personally spent 45 minutes of critical care time.  This is time spent at patient's bedside actively involved in patient care as well as the coordination of care and discussions with the patient's family.  This does not include any procedural time which has been billed separately.    Manuel Morrissey MD  Pulmonary/[facility]  [phone]  4/21/2025

## 2025-04-22 ENCOUNTER — APPOINTMENT (OUTPATIENT)
Facility: HOSPITAL | Age: 45
DRG: 165 | End: 2025-04-22
Attending: INTERNAL MEDICINE
Payer: COMMERCIAL

## 2025-04-22 LAB
ACUTE KIDNEY INJURY RISK NEPHROCHECK: 0.17 (ref 0–0.3)
ACUTE KIDNEY INJURY RISK NEPHROCHECK: 0.56 (ref 0–0.3)
ANION GAP SERPL CALC-SCNC: 4 MMOL/L (ref 2–12)
ARTERIAL PATENCY WRIST A: ABNORMAL
BASE DEFICIT BLDA-SCNC: 1.9 MMOL/L
BDY SITE: ABNORMAL
BUN SERPL-MCNC: 7 MG/DL (ref 6–20)
BUN/CREAT SERPL: 14 (ref 12–20)
CALCIUM SERPL-MCNC: 8.9 MG/DL (ref 8.5–10.1)
CHLORIDE SERPL-SCNC: 112 MMOL/L (ref 97–108)
CO2 SERPL-SCNC: 23 MMOL/L (ref 21–32)
CREAT SERPL-MCNC: 0.5 MG/DL (ref 0.7–1.3)
EKG ATRIAL RATE: 77 BPM
EKG ATRIAL RATE: 80 BPM
EKG DIAGNOSIS: NORMAL
EKG DIAGNOSIS: NORMAL
EKG P AXIS: 47 DEGREES
EKG P AXIS: 53 DEGREES
EKG P-R INTERVAL: 170 MS
EKG P-R INTERVAL: 230 MS
EKG Q-T INTERVAL: 404 MS
EKG Q-T INTERVAL: 438 MS
EKG QRS DURATION: 100 MS
EKG QRS DURATION: 98 MS
EKG QTC CALCULATION (BAZETT): 465 MS
EKG QTC CALCULATION (BAZETT): 495 MS
EKG R AXIS: -13 DEGREES
EKG R AXIS: 4 DEGREES
EKG T AXIS: -2 DEGREES
EKG T AXIS: 18 DEGREES
EKG VENTRICULAR RATE: 77 BPM
EKG VENTRICULAR RATE: 80 BPM
ERYTHROCYTE [DISTWIDTH] IN BLOOD BY AUTOMATED COUNT: 15 % (ref 11.5–14.5)
GLUCOSE BLD STRIP.AUTO-MCNC: 102 MG/DL (ref 65–117)
GLUCOSE BLD STRIP.AUTO-MCNC: 105 MG/DL (ref 65–117)
GLUCOSE BLD STRIP.AUTO-MCNC: 106 MG/DL (ref 65–117)
GLUCOSE BLD STRIP.AUTO-MCNC: 110 MG/DL (ref 65–117)
GLUCOSE BLD STRIP.AUTO-MCNC: 111 MG/DL (ref 65–117)
GLUCOSE BLD STRIP.AUTO-MCNC: 116 MG/DL (ref 65–117)
GLUCOSE BLD STRIP.AUTO-MCNC: 117 MG/DL (ref 65–117)
GLUCOSE BLD STRIP.AUTO-MCNC: 120 MG/DL (ref 65–117)
GLUCOSE BLD STRIP.AUTO-MCNC: 122 MG/DL (ref 65–117)
GLUCOSE BLD STRIP.AUTO-MCNC: 126 MG/DL (ref 65–117)
GLUCOSE BLD STRIP.AUTO-MCNC: 130 MG/DL (ref 65–117)
GLUCOSE BLD STRIP.AUTO-MCNC: 144 MG/DL (ref 65–117)
GLUCOSE BLD STRIP.AUTO-MCNC: 66 MG/DL (ref 65–117)
GLUCOSE BLD STRIP.AUTO-MCNC: 94 MG/DL (ref 65–117)
GLUCOSE BLD STRIP.AUTO-MCNC: 99 MG/DL (ref 65–117)
GLUCOSE SERPL-MCNC: 100 MG/DL (ref 65–100)
HCO3 BLDA-SCNC: 23 MMOL/L (ref 22–26)
HCT VFR BLD AUTO: 32.4 % (ref 36.6–50.3)
HGB BLD-MCNC: 10.3 G/DL (ref 12.1–17)
HGB BLD-MCNC: 10.9 G/DL (ref 12.1–17)
HGB BLD-MCNC: 12.1 G/DL (ref 12.1–17)
HGB BLD-MCNC: 12.3 G/DL (ref 12.1–17)
HGB BLD-MCNC: 9.1 G/DL (ref 12.1–17)
HGB BLD-MCNC: 9.7 G/DL (ref 12.1–17)
HGB BLD-MCNC: 9.8 G/DL (ref 12.1–17)
MAGNESIUM SERPL-MCNC: 2.8 MG/DL (ref 1.6–2.4)
MCH RBC QN AUTO: 30.2 PG (ref 26–34)
MCHC RBC AUTO-ENTMCNC: 33.6 G/DL (ref 30–36.5)
MCV RBC AUTO: 89.8 FL (ref 80–99)
NRBC # BLD: 0 K/UL (ref 0–0.01)
NRBC BLD-RTO: 0 PER 100 WBC
PCO2 BLDA: 38 MMHG (ref 35–45)
PH BLDA: 7.4 (ref 7.35–7.45)
PLATELET # BLD AUTO: 196 K/UL (ref 150–400)
PMV BLD AUTO: 11.3 FL (ref 8.9–12.9)
PO2 BLDA: 111 MMHG (ref 80–100)
POTASSIUM SERPL-SCNC: 4.6 MMOL/L (ref 3.5–5.1)
RBC # BLD AUTO: 3.61 M/UL (ref 4.1–5.7)
SAO2 % BLD: 98 % (ref 92–97)
SAO2% DEVICE SAO2% SENSOR NAME: ABNORMAL
SERVICE CMNT-IMP: ABNORMAL
SERVICE CMNT-IMP: NORMAL
SODIUM SERPL-SCNC: 139 MMOL/L (ref 136–145)
SPECIMEN SITE: ABNORMAL
WBC # BLD AUTO: 19 K/UL (ref 4.1–11.1)

## 2025-04-22 PROCEDURE — 85027 COMPLETE CBC AUTOMATED: CPT

## 2025-04-22 PROCEDURE — 6360000002 HC RX W HCPCS: Performed by: INTERNAL MEDICINE

## 2025-04-22 PROCEDURE — 97535 SELF CARE MNGMENT TRAINING: CPT | Performed by: OCCUPATIONAL THERAPIST

## 2025-04-22 PROCEDURE — 97110 THERAPEUTIC EXERCISES: CPT

## 2025-04-22 PROCEDURE — 6360000002 HC RX W HCPCS: Performed by: PHYSICIAN ASSISTANT

## 2025-04-22 PROCEDURE — P9045 ALBUMIN (HUMAN), 5%, 250 ML: HCPCS | Performed by: PHYSICIAN ASSISTANT

## 2025-04-22 PROCEDURE — 2500000003 HC RX 250 WO HCPCS: Performed by: INTERNAL MEDICINE

## 2025-04-22 PROCEDURE — 2500000003 HC RX 250 WO HCPCS: Performed by: PHYSICIAN ASSISTANT

## 2025-04-22 PROCEDURE — 6370000000 HC RX 637 (ALT 250 FOR IP): Performed by: PHYSICIAN ASSISTANT

## 2025-04-22 PROCEDURE — 2500000003 HC RX 250 WO HCPCS: Performed by: NURSE PRACTITIONER

## 2025-04-22 PROCEDURE — 2580000003 HC RX 258: Performed by: PHYSICIAN ASSISTANT

## 2025-04-22 PROCEDURE — 97116 GAIT TRAINING THERAPY: CPT

## 2025-04-22 PROCEDURE — 6370000000 HC RX 637 (ALT 250 FOR IP): Performed by: NURSE PRACTITIONER

## 2025-04-22 PROCEDURE — 82962 GLUCOSE BLOOD TEST: CPT

## 2025-04-22 PROCEDURE — 71045 X-RAY EXAM CHEST 1 VIEW: CPT

## 2025-04-22 PROCEDURE — 2700000000 HC OXYGEN THERAPY PER DAY

## 2025-04-22 PROCEDURE — 83735 ASSAY OF MAGNESIUM: CPT

## 2025-04-22 PROCEDURE — 80048 BASIC METABOLIC PNL TOTAL CA: CPT

## 2025-04-22 PROCEDURE — 99231 SBSQ HOSP IP/OBS SF/LOW 25: CPT | Performed by: INTERNAL MEDICINE

## 2025-04-22 PROCEDURE — 97162 PT EVAL MOD COMPLEX 30 MIN: CPT

## 2025-04-22 PROCEDURE — 6360000002 HC RX W HCPCS: Performed by: NURSE PRACTITIONER

## 2025-04-22 PROCEDURE — 94640 AIRWAY INHALATION TREATMENT: CPT

## 2025-04-22 PROCEDURE — 97166 OT EVAL MOD COMPLEX 45 MIN: CPT | Performed by: OCCUPATIONAL THERAPIST

## 2025-04-22 PROCEDURE — 36415 COLL VENOUS BLD VENIPUNCTURE: CPT

## 2025-04-22 PROCEDURE — 2100000001 HC CVICU R&B

## 2025-04-22 PROCEDURE — 97110 THERAPEUTIC EXERCISES: CPT | Performed by: OCCUPATIONAL THERAPIST

## 2025-04-22 RX ORDER — FUROSEMIDE 10 MG/ML
40 INJECTION INTRAMUSCULAR; INTRAVENOUS DAILY
Status: DISCONTINUED | OUTPATIENT
Start: 2025-04-22 | End: 2025-04-27 | Stop reason: HOSPADM

## 2025-04-22 RX ORDER — COLCHICINE 0.6 MG/1
0.6 TABLET ORAL DAILY
Status: DISCONTINUED | OUTPATIENT
Start: 2025-04-22 | End: 2025-04-27 | Stop reason: HOSPADM

## 2025-04-22 RX ORDER — METRONIDAZOLE 500 MG/100ML
500 INJECTION, SOLUTION INTRAVENOUS EVERY 8 HOURS
Status: COMPLETED | OUTPATIENT
Start: 2025-04-22 | End: 2025-04-26

## 2025-04-22 RX ORDER — NICOTINE 21 MG/24HR
1 PATCH, TRANSDERMAL 24 HOURS TRANSDERMAL DAILY
Status: DISCONTINUED | OUTPATIENT
Start: 2025-04-22 | End: 2025-04-27 | Stop reason: HOSPADM

## 2025-04-22 RX ORDER — FUROSEMIDE 10 MG/ML
20 INJECTION INTRAMUSCULAR; INTRAVENOUS ONCE
Status: COMPLETED | OUTPATIENT
Start: 2025-04-22 | End: 2025-04-22

## 2025-04-22 RX ORDER — ATORVASTATIN CALCIUM 40 MG/1
80 TABLET, FILM COATED ORAL NIGHTLY
Status: DISCONTINUED | OUTPATIENT
Start: 2025-04-22 | End: 2025-04-27 | Stop reason: HOSPADM

## 2025-04-22 RX ORDER — METHOCARBAMOL 500 MG/1
750 TABLET, FILM COATED ORAL 4 TIMES DAILY
Status: DISCONTINUED | OUTPATIENT
Start: 2025-04-22 | End: 2025-04-27 | Stop reason: HOSPADM

## 2025-04-22 RX ADMIN — MUPIROCIN: 20 OINTMENT TOPICAL at 20:13

## 2025-04-22 RX ADMIN — OXYCODONE 10 MG: 5 TABLET ORAL at 03:21

## 2025-04-22 RX ADMIN — ALBUMIN (HUMAN) 12.5 G: 12.5 INJECTION, SOLUTION INTRAVENOUS at 02:55

## 2025-04-22 RX ADMIN — SENNOSIDES AND DOCUSATE SODIUM 1 TABLET: 50; 8.6 TABLET ORAL at 20:12

## 2025-04-22 RX ADMIN — SODIUM CHLORIDE, PRESERVATIVE FREE 10 ML: 5 INJECTION INTRAVENOUS at 08:59

## 2025-04-22 RX ADMIN — DOXYCYCLINE 100 MG: 100 INJECTION, POWDER, LYOPHILIZED, FOR SOLUTION INTRAVENOUS at 00:22

## 2025-04-22 RX ADMIN — FAMOTIDINE 20 MG: 20 TABLET, FILM COATED ORAL at 20:12

## 2025-04-22 RX ADMIN — COLCHICINE 0.6 MG: 0.6 TABLET, FILM COATED ORAL at 08:58

## 2025-04-22 RX ADMIN — METHOCARBAMOL TABLETS 750 MG: 500 TABLET, COATED ORAL at 17:43

## 2025-04-22 RX ADMIN — SODIUM CHLORIDE, PRESERVATIVE FREE 10 ML: 5 INJECTION INTRAVENOUS at 20:13

## 2025-04-22 RX ADMIN — ACETAMINOPHEN 1000 MG: 500 TABLET, FILM COATED ORAL at 12:24

## 2025-04-22 RX ADMIN — Medication 5.1 UNITS/HR: at 16:06

## 2025-04-22 RX ADMIN — NICARDIPINE HYDROCHLORIDE 5 MG/HR: 25 INJECTION INTRAVENOUS at 05:14

## 2025-04-22 RX ADMIN — FAMOTIDINE 20 MG: 20 TABLET, FILM COATED ORAL at 08:58

## 2025-04-22 RX ADMIN — HYDROMORPHONE HYDROCHLORIDE 0.5 MG: 1 INJECTION, SOLUTION INTRAMUSCULAR; INTRAVENOUS; SUBCUTANEOUS at 23:20

## 2025-04-22 RX ADMIN — ASPIRIN 81 MG: 81 TABLET, COATED ORAL at 08:58

## 2025-04-22 RX ADMIN — WATER 2000 MG: 1 INJECTION INTRAMUSCULAR; INTRAVENOUS; SUBCUTANEOUS at 10:46

## 2025-04-22 RX ADMIN — ONDANSETRON 4 MG: 2 INJECTION, SOLUTION INTRAMUSCULAR; INTRAVENOUS at 01:30

## 2025-04-22 RX ADMIN — OXYCODONE 10 MG: 5 TABLET ORAL at 07:32

## 2025-04-22 RX ADMIN — GABAPENTIN 200 MG: 100 CAPSULE ORAL at 15:06

## 2025-04-22 RX ADMIN — METHOCARBAMOL TABLETS 750 MG: 500 TABLET, COATED ORAL at 12:22

## 2025-04-22 RX ADMIN — METHOCARBAMOL TABLETS 750 MG: 500 TABLET, COATED ORAL at 20:12

## 2025-04-22 RX ADMIN — OXYCODONE 10 MG: 5 TABLET ORAL at 11:13

## 2025-04-22 RX ADMIN — ATORVASTATIN CALCIUM 80 MG: 40 TABLET, FILM COATED ORAL at 20:11

## 2025-04-22 RX ADMIN — METRONIDAZOLE 500 MG: 500 INJECTION, SOLUTION INTRAVENOUS at 19:03

## 2025-04-22 RX ADMIN — AMIODARONE HYDROCHLORIDE 400 MG: 200 TABLET ORAL at 08:58

## 2025-04-22 RX ADMIN — Medication 400 MG: at 20:12

## 2025-04-22 RX ADMIN — AMIODARONE HYDROCHLORIDE 400 MG: 200 TABLET ORAL at 20:12

## 2025-04-22 RX ADMIN — GABAPENTIN 200 MG: 100 CAPSULE ORAL at 08:58

## 2025-04-22 RX ADMIN — HYDROMORPHONE HYDROCHLORIDE 0.5 MG: 1 INJECTION, SOLUTION INTRAMUSCULAR; INTRAVENOUS; SUBCUTANEOUS at 05:18

## 2025-04-22 RX ADMIN — 0.12% CHLORHEXIDINE GLUCONATE 15 ML: 1.2 RINSE ORAL at 20:11

## 2025-04-22 RX ADMIN — FUROSEMIDE 40 MG: 10 INJECTION, SOLUTION INTRAMUSCULAR; INTRAVENOUS at 15:51

## 2025-04-22 RX ADMIN — ARFORMOTEROL TARTRATE: 15 SOLUTION RESPIRATORY (INHALATION) at 20:02

## 2025-04-22 RX ADMIN — GABAPENTIN 200 MG: 100 CAPSULE ORAL at 20:12

## 2025-04-22 RX ADMIN — ALBUMIN (HUMAN) 12.5 G: 12.5 INJECTION, SOLUTION INTRAVENOUS at 00:28

## 2025-04-22 RX ADMIN — HYDROMORPHONE HYDROCHLORIDE 0.5 MG: 1 INJECTION, SOLUTION INTRAMUSCULAR; INTRAVENOUS; SUBCUTANEOUS at 05:57

## 2025-04-22 RX ADMIN — METHOCARBAMOL TABLETS 750 MG: 500 TABLET, COATED ORAL at 08:57

## 2025-04-22 RX ADMIN — MUPIROCIN: 20 OINTMENT TOPICAL at 08:59

## 2025-04-22 RX ADMIN — ACETAMINOPHEN 1000 MG: 500 TABLET, FILM COATED ORAL at 18:19

## 2025-04-22 RX ADMIN — HYDROMORPHONE HYDROCHLORIDE 0.5 MG: 1 INJECTION, SOLUTION INTRAMUSCULAR; INTRAVENOUS; SUBCUTANEOUS at 01:30

## 2025-04-22 RX ADMIN — FUROSEMIDE 20 MG: 10 INJECTION, SOLUTION INTRAMUSCULAR; INTRAVENOUS at 08:58

## 2025-04-22 RX ADMIN — ARFORMOTEROL TARTRATE: 15 SOLUTION RESPIRATORY (INHALATION) at 07:49

## 2025-04-22 RX ADMIN — SENNOSIDES AND DOCUSATE SODIUM 1 TABLET: 50; 8.6 TABLET ORAL at 08:58

## 2025-04-22 RX ADMIN — 0.12% CHLORHEXIDINE GLUCONATE 15 ML: 1.2 RINSE ORAL at 08:58

## 2025-04-22 RX ADMIN — Medication 400 MG: at 08:58

## 2025-04-22 RX ADMIN — HYDROMORPHONE HYDROCHLORIDE 0.5 MG: 1 INJECTION, SOLUTION INTRAMUSCULAR; INTRAVENOUS; SUBCUTANEOUS at 13:04

## 2025-04-22 RX ADMIN — METRONIDAZOLE 500 MG: 500 INJECTION, SOLUTION INTRAVENOUS at 11:00

## 2025-04-22 RX ADMIN — HYDROMORPHONE HYDROCHLORIDE 0.5 MG: 1 INJECTION, SOLUTION INTRAMUSCULAR; INTRAVENOUS; SUBCUTANEOUS at 18:57

## 2025-04-22 RX ADMIN — ACETAMINOPHEN 1000 MG: 500 TABLET, FILM COATED ORAL at 05:33

## 2025-04-22 RX ADMIN — WATER 2000 MG: 1 INJECTION INTRAMUSCULAR; INTRAVENOUS; SUBCUTANEOUS at 18:21

## 2025-04-22 RX ADMIN — ACETAMINOPHEN 1000 MG: 500 TABLET, FILM COATED ORAL at 00:17

## 2025-04-22 RX ADMIN — POLYETHYLENE GLYCOL 3350 17 G: 17 POWDER, FOR SOLUTION ORAL at 08:58

## 2025-04-22 ASSESSMENT — PAIN SCALES - GENERAL
PAINLEVEL_OUTOF10: 2
PAINLEVEL_OUTOF10: 7
PAINLEVEL_OUTOF10: 9
PAINLEVEL_OUTOF10: 2
PAINLEVEL_OUTOF10: 10
PAINLEVEL_OUTOF10: 7
PAINLEVEL_OUTOF10: 1
PAINLEVEL_OUTOF10: 8
PAINLEVEL_OUTOF10: 8
PAINLEVEL_OUTOF10: 10
PAINLEVEL_OUTOF10: 2
PAINLEVEL_OUTOF10: 7
PAINLEVEL_OUTOF10: 2
PAINLEVEL_OUTOF10: 7
PAINLEVEL_OUTOF10: 7
PAINLEVEL_OUTOF10: 10
PAINLEVEL_OUTOF10: 1

## 2025-04-22 ASSESSMENT — PAIN DESCRIPTION - PAIN TYPE
TYPE: SURGICAL PAIN

## 2025-04-22 ASSESSMENT — PAIN DESCRIPTION - LOCATION
LOCATION: INCISION
LOCATION: CHEST
LOCATION: INCISION

## 2025-04-22 ASSESSMENT — PAIN DESCRIPTION - DESCRIPTORS
DESCRIPTORS: ACHING

## 2025-04-22 ASSESSMENT — PAIN DESCRIPTION - ORIENTATION
ORIENTATION: MID
ORIENTATION: LEFT;MID
ORIENTATION: MID
ORIENTATION: LEFT;MID
ORIENTATION: MID
ORIENTATION: MID;LEFT
ORIENTATION: MID
ORIENTATION: MID;LEFT

## 2025-04-22 ASSESSMENT — PAIN - FUNCTIONAL ASSESSMENT
PAIN_FUNCTIONAL_ASSESSMENT: ACTIVITIES ARE NOT PREVENTED

## 2025-04-22 NOTE — WOUND CARE
Wound care nurse consulted by CVICU nurse for left jaw lanced abscess.    46 y/o male admitted for unstable angina.  4/21/25 - CABG by Dr Loco  Past Medical History:   Diagnosis Date    Asthma     Diabetes (HCC)     Gall stone 2022    Hemorrhoid     Hypertension     Melanoma (HCC) 2010    STOMACH     Past Surgical History:   Procedure Laterality Date    CARDIAC CATHETERIZATION  08/07/2024    stent and  PTCA at [facility]    CARDIAC PROCEDURE N/A 4/14/2025    Left heart cath / coronary angiography performed by Bernardino Vargas MD at [facility] CARDIAC CATH LAB    CHOLECYSTECTOMY  12/06/2022    Dr Cedric Wilson    COLONOSCOPY N/A 06/23/2022    COLONOSCOPY performed by Facundo Sandy MD at [facility] ENDOSCOPY    COLONOSCOPY,REMV LESN,SNARE  06/23/2022         CORONARY ANGIOPLASTY      CORONARY ARTERY BYPASS GRAFT N/A 4/21/2025    ON-PUMP CORONARY ARTERY BYPASS GRAFTING TIMES TWO WITH LEFT INTERNAL MAMMARY ARTERY, ENDOSCOPIC HARVESTING OF THE RIGHT GREATER SAPHENOUS VEIN, AND LEFT ATRIAL APPENDAGE LIGATION (E.R.A.S.). LATOYA BY DR. HAUSER. performed by Cheng Johnson MD at [facility] OPEN HEART    GI      COLONOSCOPY    GI      HEMORRHOIDECTOMY    INVASIVE VASCULAR N/A 4/14/2025    Ultrasound guided vascular access performed by Bernardino Vargas MD at [facility] CARDIAC CATH LAB    OTHER SURGICAL HISTORY      hemorrhoids removed    OTHER SURGICAL HISTORY      MELANOMA REMOVED FROM STOMACH    POLYPECTOMY       WC nurse spoke with CVICU nurse and CCU doctor Dr Simmonds who lanced a boil/abscess to this patients left submandibular region yesterday at bedside while patient still intubated and sedated in the immediate post op interval.  Dr Simmonds expressed approx 10 cc of very thick pus with minimal blood. MD stated he placed some packing in wound, covered it and looked at it this mrning and left packing in place because it was still draining. This morning recovered with a clean piece of gauze. Dr Simmonds states he will remove packing and dressing

## 2025-04-22 NOTE — FLOWSHEET NOTE
6072-9361: pt OOB to chair, c/o pain, scheduled meds given.  9642-9690: worked with PT/OT, tolerated well.  0445-7382: pt c/o pain, meds given, See MAR  7806-6579: pt back to bed, resting with eyes closed.  8717-4257: New PIV, OOB to ambulate, tolerated well, sitting in chair.

## 2025-04-22 NOTE — DIABETES MGMT
This is a 45-year-old male with a history of type 2 diabetes mellitus, coronary artery disease status post MI x 3 with multiple PCI, and hypertension who presented to [facility] on 4/11/2025 with complaints of substernal chest pain.  Admission labs notable for glucose 245, A1c 10.3%, creatinine 0.92, bicarb 24, normal troponins.  He was transferred to [facility] where he underwent repeat cardiac catheterization revealing multivessel disease.  He did receive multiple doses of prednisone for dye allergy.  He is now admitted in anticipation of CABG on Monday.  We are asked to assist in glucose management.     Patient tells me that he has had diabetes for 10 years and for the most part has been on Lantus and Humalog.  [facility] and their most recent note indicates a regimen of 50 units of Lantus, Humalog 4 units with meals, Farxiga 5 mg and Trulicity 0.75 mg weekly.  He claims adherence to the medical regimen.  He tells me today that he previously drank a lot of sodas but within the last month has switched to 0 sugar sodas.  He lives with his mother who also has diabetes.    The patient was treated with subcutaneous insulin and then transition to insulin infusion on 4/20/2025.  He is now status post CABG x 3 on 4/21/2025.  The insulin infusion continues at a current rate of 4.5 units an hour.    Examination  Blood pressure 126/80  Pulse 84  Afebrile  100% on room air  Weight 113 kg    Recent laboratory data  Glucose 116 (range )  Creatinine 0.50  Bicarb 23    Impression  1.  Type 2 diabetes mellitus without admission A1c of 10.3%  2.  Coronary artery disease status post CABG x 3 on 4/21/2025    Plan:  1.  We will continue the insulin infusion for an additional 24 hours per protocol  2.  Anticipate restarting subcutaneous insulin tomorrow.  Dosage will be dependent on his requirements but based on his presurgical requirements, we would anticipate

## 2025-04-23 ENCOUNTER — APPOINTMENT (OUTPATIENT)
Facility: HOSPITAL | Age: 45
DRG: 165 | End: 2025-04-23
Attending: INTERNAL MEDICINE
Payer: COMMERCIAL

## 2025-04-23 DIAGNOSIS — Z95.1 S/P CABG (CORONARY ARTERY BYPASS GRAFT): Primary | ICD-10-CM

## 2025-04-23 PROBLEM — R73.09 HEMOGLOBIN A1C GREATER THAN 9%, INDICATING POOR DIABETIC CONTROL: Status: ACTIVE | Noted: 2025-04-23

## 2025-04-23 PROBLEM — R73.9 STRESS HYPERGLYCEMIA: Status: ACTIVE | Noted: 2025-04-23

## 2025-04-23 LAB
ANION GAP SERPL CALC-SCNC: 11 MMOL/L (ref 2–12)
BUN SERPL-MCNC: 11 MG/DL (ref 6–20)
BUN/CREAT SERPL: 13 (ref 12–20)
CALCIUM SERPL-MCNC: 8.9 MG/DL (ref 8.5–10.1)
CHLORIDE SERPL-SCNC: 102 MMOL/L (ref 97–108)
CO2 SERPL-SCNC: 22 MMOL/L (ref 21–32)
CREAT SERPL-MCNC: 0.86 MG/DL (ref 0.7–1.3)
ERYTHROCYTE [DISTWIDTH] IN BLOOD BY AUTOMATED COUNT: 15.8 % (ref 11.5–14.5)
GLUCOSE BLD STRIP.AUTO-MCNC: 104 MG/DL (ref 65–117)
GLUCOSE BLD STRIP.AUTO-MCNC: 106 MG/DL (ref 65–117)
GLUCOSE BLD STRIP.AUTO-MCNC: 110 MG/DL (ref 65–117)
GLUCOSE BLD STRIP.AUTO-MCNC: 115 MG/DL (ref 65–117)
GLUCOSE BLD STRIP.AUTO-MCNC: 117 MG/DL (ref 65–117)
GLUCOSE BLD STRIP.AUTO-MCNC: 123 MG/DL (ref 65–117)
GLUCOSE BLD STRIP.AUTO-MCNC: 128 MG/DL (ref 65–117)
GLUCOSE BLD STRIP.AUTO-MCNC: 146 MG/DL (ref 65–117)
GLUCOSE BLD STRIP.AUTO-MCNC: 156 MG/DL (ref 65–117)
GLUCOSE BLD STRIP.AUTO-MCNC: 178 MG/DL (ref 65–117)
GLUCOSE BLD STRIP.AUTO-MCNC: 184 MG/DL (ref 65–117)
GLUCOSE BLD STRIP.AUTO-MCNC: 209 MG/DL (ref 65–117)
GLUCOSE BLD STRIP.AUTO-MCNC: 246 MG/DL (ref 65–117)
GLUCOSE BLD STRIP.AUTO-MCNC: 268 MG/DL (ref 65–117)
GLUCOSE BLD STRIP.AUTO-MCNC: 281 MG/DL (ref 65–117)
GLUCOSE BLD STRIP.AUTO-MCNC: 289 MG/DL (ref 65–117)
GLUCOSE BLD STRIP.AUTO-MCNC: 290 MG/DL (ref 65–117)
GLUCOSE BLD STRIP.AUTO-MCNC: 324 MG/DL (ref 65–117)
GLUCOSE BLD STRIP.AUTO-MCNC: 87 MG/DL (ref 65–117)
GLUCOSE SERPL-MCNC: 216 MG/DL (ref 65–100)
HCT VFR BLD AUTO: 33.8 % (ref 36.6–50.3)
HGB BLD-MCNC: 11 G/DL (ref 12.1–17)
MAGNESIUM SERPL-MCNC: 2.2 MG/DL (ref 1.6–2.4)
MCH RBC QN AUTO: 29.4 PG (ref 26–34)
MCHC RBC AUTO-ENTMCNC: 32.5 G/DL (ref 30–36.5)
MCV RBC AUTO: 90.4 FL (ref 80–99)
NRBC # BLD: 0 K/UL (ref 0–0.01)
NRBC BLD-RTO: 0 PER 100 WBC
PLATELET # BLD AUTO: 213 K/UL (ref 150–400)
PMV BLD AUTO: 11.6 FL (ref 8.9–12.9)
POTASSIUM SERPL-SCNC: 4.7 MMOL/L (ref 3.5–5.1)
RBC # BLD AUTO: 3.74 M/UL (ref 4.1–5.7)
SERVICE CMNT-IMP: ABNORMAL
SERVICE CMNT-IMP: NORMAL
SODIUM SERPL-SCNC: 135 MMOL/L (ref 136–145)
WBC # BLD AUTO: 29.6 K/UL (ref 4.1–11.1)

## 2025-04-23 PROCEDURE — 6370000000 HC RX 637 (ALT 250 FOR IP): Performed by: NURSE PRACTITIONER

## 2025-04-23 PROCEDURE — 6360000002 HC RX W HCPCS: Performed by: PHYSICIAN ASSISTANT

## 2025-04-23 PROCEDURE — 6360000002 HC RX W HCPCS: Performed by: NURSE PRACTITIONER

## 2025-04-23 PROCEDURE — 51798 US URINE CAPACITY MEASURE: CPT

## 2025-04-23 PROCEDURE — 71045 X-RAY EXAM CHEST 1 VIEW: CPT

## 2025-04-23 PROCEDURE — 99231 SBSQ HOSP IP/OBS SF/LOW 25: CPT | Performed by: CLINICAL NURSE SPECIALIST

## 2025-04-23 PROCEDURE — 83735 ASSAY OF MAGNESIUM: CPT

## 2025-04-23 PROCEDURE — 97530 THERAPEUTIC ACTIVITIES: CPT

## 2025-04-23 PROCEDURE — 6370000000 HC RX 637 (ALT 250 FOR IP): Performed by: CLINICAL NURSE SPECIALIST

## 2025-04-23 PROCEDURE — 6370000000 HC RX 637 (ALT 250 FOR IP): Performed by: PHYSICIAN ASSISTANT

## 2025-04-23 PROCEDURE — 2500000003 HC RX 250 WO HCPCS: Performed by: INTERNAL MEDICINE

## 2025-04-23 PROCEDURE — 6360000002 HC RX W HCPCS: Performed by: INTERNAL MEDICINE

## 2025-04-23 PROCEDURE — 81001 URINALYSIS AUTO W/SCOPE: CPT

## 2025-04-23 PROCEDURE — 94640 AIRWAY INHALATION TREATMENT: CPT

## 2025-04-23 PROCEDURE — 85027 COMPLETE CBC AUTOMATED: CPT

## 2025-04-23 PROCEDURE — 82962 GLUCOSE BLOOD TEST: CPT

## 2025-04-23 PROCEDURE — 97116 GAIT TRAINING THERAPY: CPT

## 2025-04-23 PROCEDURE — 2060000000 HC ICU INTERMEDIATE R&B

## 2025-04-23 PROCEDURE — 2500000003 HC RX 250 WO HCPCS: Performed by: PHYSICIAN ASSISTANT

## 2025-04-23 PROCEDURE — 36415 COLL VENOUS BLD VENIPUNCTURE: CPT

## 2025-04-23 PROCEDURE — 80048 BASIC METABOLIC PNL TOTAL CA: CPT

## 2025-04-23 PROCEDURE — 97110 THERAPEUTIC EXERCISES: CPT

## 2025-04-23 PROCEDURE — 2580000003 HC RX 258: Performed by: PHYSICIAN ASSISTANT

## 2025-04-23 RX ORDER — AMLODIPINE BESYLATE 5 MG/1
5 TABLET ORAL DAILY
Status: DISCONTINUED | OUTPATIENT
Start: 2025-04-24 | End: 2025-04-27 | Stop reason: HOSPADM

## 2025-04-23 RX ORDER — TAMSULOSIN HYDROCHLORIDE 0.4 MG/1
0.4 CAPSULE ORAL DAILY
Status: DISCONTINUED | OUTPATIENT
Start: 2025-04-23 | End: 2025-04-27 | Stop reason: HOSPADM

## 2025-04-23 RX ORDER — METOPROLOL TARTRATE 25 MG/1
25 TABLET, FILM COATED ORAL 2 TIMES DAILY
Status: DISCONTINUED | OUTPATIENT
Start: 2025-04-23 | End: 2025-04-24

## 2025-04-23 RX ORDER — INSULIN LISPRO 100 [IU]/ML
0-6 INJECTION, SOLUTION INTRAVENOUS; SUBCUTANEOUS NIGHTLY
Status: DISCONTINUED | OUTPATIENT
Start: 2025-04-24 | End: 2025-04-27 | Stop reason: HOSPADM

## 2025-04-23 RX ORDER — INSULIN LISPRO 100 [IU]/ML
1-20 INJECTION, SOLUTION INTRAVENOUS; SUBCUTANEOUS
Status: DISCONTINUED | OUTPATIENT
Start: 2025-04-24 | End: 2025-04-24

## 2025-04-23 RX ADMIN — BISACODYL 10 MG: 10 SUPPOSITORY RECTAL at 14:15

## 2025-04-23 RX ADMIN — FUROSEMIDE 40 MG: 10 INJECTION, SOLUTION INTRAMUSCULAR; INTRAVENOUS at 09:21

## 2025-04-23 RX ADMIN — WATER 2000 MG: 1 INJECTION INTRAMUSCULAR; INTRAVENOUS; SUBCUTANEOUS at 02:41

## 2025-04-23 RX ADMIN — MUPIROCIN: 20 OINTMENT TOPICAL at 09:04

## 2025-04-23 RX ADMIN — ASPIRIN 81 MG: 81 TABLET, COATED ORAL at 09:00

## 2025-04-23 RX ADMIN — METRONIDAZOLE 500 MG: 500 INJECTION, SOLUTION INTRAVENOUS at 10:30

## 2025-04-23 RX ADMIN — SODIUM CHLORIDE, PRESERVATIVE FREE 10 ML: 5 INJECTION INTRAVENOUS at 20:59

## 2025-04-23 RX ADMIN — Medication 3.7 UNITS/HR: at 19:45

## 2025-04-23 RX ADMIN — WATER 2000 MG: 1 INJECTION INTRAMUSCULAR; INTRAVENOUS; SUBCUTANEOUS at 18:18

## 2025-04-23 RX ADMIN — ACETAMINOPHEN 1000 MG: 500 TABLET, FILM COATED ORAL at 06:43

## 2025-04-23 RX ADMIN — METRONIDAZOLE 500 MG: 500 INJECTION, SOLUTION INTRAVENOUS at 02:40

## 2025-04-23 RX ADMIN — METHOCARBAMOL TABLETS 750 MG: 500 TABLET, COATED ORAL at 14:14

## 2025-04-23 RX ADMIN — INSULIN LISPRO 4 UNITS: 100 INJECTION, SOLUTION INTRAVENOUS; SUBCUTANEOUS at 14:17

## 2025-04-23 RX ADMIN — SODIUM CHLORIDE: 0.9 INJECTION, SOLUTION INTRAVENOUS at 18:27

## 2025-04-23 RX ADMIN — WATER 2000 MG: 1 INJECTION INTRAMUSCULAR; INTRAVENOUS; SUBCUTANEOUS at 09:47

## 2025-04-23 RX ADMIN — METHOCARBAMOL TABLETS 750 MG: 500 TABLET, COATED ORAL at 10:40

## 2025-04-23 RX ADMIN — COLCHICINE 0.6 MG: 0.6 TABLET, FILM COATED ORAL at 09:00

## 2025-04-23 RX ADMIN — ACETAMINOPHEN 1000 MG: 500 TABLET, FILM COATED ORAL at 19:01

## 2025-04-23 RX ADMIN — Medication 33.8 UNITS/HR: at 13:32

## 2025-04-23 RX ADMIN — METOPROLOL TARTRATE 25 MG: 25 TABLET, FILM COATED ORAL at 21:01

## 2025-04-23 RX ADMIN — FAMOTIDINE 20 MG: 20 TABLET, FILM COATED ORAL at 21:02

## 2025-04-23 RX ADMIN — Medication 48 UNITS/HR: at 16:19

## 2025-04-23 RX ADMIN — Medication 400 MG: at 21:02

## 2025-04-23 RX ADMIN — METRONIDAZOLE 500 MG: 500 INJECTION, SOLUTION INTRAVENOUS at 18:28

## 2025-04-23 RX ADMIN — FAMOTIDINE 20 MG: 20 TABLET, FILM COATED ORAL at 08:16

## 2025-04-23 RX ADMIN — ARFORMOTEROL TARTRATE: 15 SOLUTION RESPIRATORY (INHALATION) at 08:40

## 2025-04-23 RX ADMIN — METHOCARBAMOL TABLETS 750 MG: 500 TABLET, COATED ORAL at 20:59

## 2025-04-23 RX ADMIN — GABAPENTIN 200 MG: 100 CAPSULE ORAL at 09:47

## 2025-04-23 RX ADMIN — GABAPENTIN 200 MG: 100 CAPSULE ORAL at 21:00

## 2025-04-23 RX ADMIN — POLYETHYLENE GLYCOL 3350 17 G: 17 POWDER, FOR SOLUTION ORAL at 09:04

## 2025-04-23 RX ADMIN — METOPROLOL TARTRATE 25 MG: 25 TABLET, FILM COATED ORAL at 09:20

## 2025-04-23 RX ADMIN — ACETAMINOPHEN 1000 MG: 500 TABLET, FILM COATED ORAL at 12:48

## 2025-04-23 RX ADMIN — MAGNESIUM HYDROXIDE 30 ML: 400 SUSPENSION ORAL at 15:59

## 2025-04-23 RX ADMIN — Medication 400 MG: at 09:04

## 2025-04-23 RX ADMIN — SODIUM CHLORIDE, PRESERVATIVE FREE 10 ML: 5 INJECTION INTRAVENOUS at 09:21

## 2025-04-23 RX ADMIN — AMIODARONE HYDROCHLORIDE 400 MG: 200 TABLET ORAL at 21:01

## 2025-04-23 RX ADMIN — Medication 24.5 UNITS/HR: at 10:35

## 2025-04-23 RX ADMIN — ATORVASTATIN CALCIUM 80 MG: 40 TABLET, FILM COATED ORAL at 21:00

## 2025-04-23 RX ADMIN — METHOCARBAMOL TABLETS 750 MG: 500 TABLET, COATED ORAL at 18:17

## 2025-04-23 RX ADMIN — ARFORMOTEROL TARTRATE: 15 SOLUTION RESPIRATORY (INHALATION) at 21:12

## 2025-04-23 RX ADMIN — HYDROMORPHONE HYDROCHLORIDE 0.5 MG: 1 INJECTION, SOLUTION INTRAMUSCULAR; INTRAVENOUS; SUBCUTANEOUS at 06:44

## 2025-04-23 RX ADMIN — AMIODARONE HYDROCHLORIDE 400 MG: 200 TABLET ORAL at 09:20

## 2025-04-23 RX ADMIN — SENNOSIDES AND DOCUSATE SODIUM 1 TABLET: 50; 8.6 TABLET ORAL at 09:04

## 2025-04-23 RX ADMIN — 0.12% CHLORHEXIDINE GLUCONATE 15 ML: 1.2 RINSE ORAL at 20:58

## 2025-04-23 RX ADMIN — GABAPENTIN 200 MG: 100 CAPSULE ORAL at 14:15

## 2025-04-23 RX ADMIN — SENNOSIDES AND DOCUSATE SODIUM 1 TABLET: 50; 8.6 TABLET ORAL at 21:01

## 2025-04-23 RX ADMIN — TAMSULOSIN HYDROCHLORIDE 0.4 MG: 0.4 CAPSULE ORAL at 10:40

## 2025-04-23 RX ADMIN — 0.12% CHLORHEXIDINE GLUCONATE 15 ML: 1.2 RINSE ORAL at 09:04

## 2025-04-23 RX ADMIN — MUPIROCIN: 20 OINTMENT TOPICAL at 20:58

## 2025-04-23 RX ADMIN — ACETAMINOPHEN 1000 MG: 500 TABLET, FILM COATED ORAL at 00:29

## 2025-04-23 ASSESSMENT — PAIN DESCRIPTION - ORIENTATION
ORIENTATION: ANTERIOR

## 2025-04-23 ASSESSMENT — PAIN SCALES - GENERAL
PAINLEVEL_OUTOF10: 0
PAINLEVEL_OUTOF10: 0
PAINLEVEL_OUTOF10: 4
PAINLEVEL_OUTOF10: 2
PAINLEVEL_OUTOF10: 5
PAINLEVEL_OUTOF10: 4
PAINLEVEL_OUTOF10: 0
PAINLEVEL_OUTOF10: 0
PAINLEVEL_OUTOF10: 3
PAINLEVEL_OUTOF10: 2
PAINLEVEL_OUTOF10: 2

## 2025-04-23 ASSESSMENT — PAIN - FUNCTIONAL ASSESSMENT
PAIN_FUNCTIONAL_ASSESSMENT: ACTIVITIES ARE NOT PREVENTED
PAIN_FUNCTIONAL_ASSESSMENT: PREVENTS OR INTERFERES SOME ACTIVE ACTIVITIES AND ADLS
PAIN_FUNCTIONAL_ASSESSMENT: ACTIVITIES ARE NOT PREVENTED

## 2025-04-23 ASSESSMENT — PAIN DESCRIPTION - DESCRIPTORS
DESCRIPTORS: SORE
DESCRIPTORS: ACHING;SORE
DESCRIPTORS: SORE

## 2025-04-23 ASSESSMENT — PAIN DESCRIPTION - LOCATION
LOCATION: INCISION
LOCATION: INCISION
LOCATION: CHEST
LOCATION: CHEST
LOCATION: INCISION
LOCATION: INCISION
LOCATION: INCISION;CHEST

## 2025-04-23 ASSESSMENT — PAIN DESCRIPTION - PAIN TYPE: TYPE: SURGICAL PAIN

## 2025-04-23 NOTE — DIABETES MGMT
This is a 45-year-old male with a history of type 2 diabetes mellitus, coronary artery disease status post MI x 3 with multiple PCI, and hypertension who presented to [facility] on 4/11/2025 with complaints of substernal chest pain.  Admission labs notable for glucose 245, A1c 10.3%, creatinine 0.92, bicarb 24, normal troponins.  He was transferred to [facility] where he underwent repeat cardiac catheterization revealing multivessel disease.  He did receive multiple doses of prednisone for dye allergy.  He did undergo a CABG x 2 on April 21, 2025 we are asked to assist in glucose management.     Patient tells me that he has had diabetes for 10 years and for the most part has been on Lantus and Humalog.  He is followed at [facility] by endocrinology and their most recent note indicates a regimen of 50 units of Lantus, Humalog 4 units with meals (filed once 12/14), Farxiga 5 mg and Trulicity 0.75 mg weekly.  He claims adherence to the medical regimen.  He tells me today that he previously drank a lot of sodas but within the last month has switched to 0 sugar sodas.  He lives with his mother who also has diabetes.  A1C has been in double digits, ranged 11.2-12.3% (currently 11.2%).     The patient was treated with subcutaneous insulin and then transition to insulin infusion on 4/20/2025.  He is now status post CABG x 3 on 4/21/2025.  He has remained on the insulin infusion for post-op management of hyperglycemia with very high insulin rates.  Rates ranged from 3.6-6.3 but with a dramatic rise in rate with introduction of carbs- current rate extremely high at 33.8 units/h    Examination  Vitals:    04/23/25 1240   BP:    Pulse:    Resp:    Temp: 98.5 °F (36.9 °C)   SpO2:    Weight 113 kg    Recent laboratory data  Glucose 269 (range 105-289)    Impression  1.  Type 2 diabetes mellitus without admission A1c of 10.3%  2.  Coronary artery disease status post CABG x 3 on

## 2025-04-23 NOTE — CARDIO/PULMONARY
S/P CABG 4/21/25 with Dr. Johnson.    Chart reviewed: Patient is 45 y.o. male admitted with Unstable angina (HCC) [I20.0]    Education: CABG education folder at bedside.  Met with Michael Fournier to review cardiac surgery post discharge instructions and to discuss participation in the Cardiac Rehab Program.     Educated using teach back method. Reviewed the use of bear for sternal support, daily weight and temperature monitoring, showering restrictions, signs and symptoms of infection at surgery sites, daily walking and arm exercises, and use of incentive spirometer.   Michael Fournier was able to demonstrate proper use of incentive spirometer, achieving 1000 ml.  Reviewed risk factors for CAD to include the following: family history, elevated BMI, hyperlipidemia, hypertension, diabetes, stress, and smoking.     Discussed Cardiac Rehab Program format, benefits, and encouraged participation. Initial Cardiac Rehab Program intake appointment scheduled for 5/15/25 @ 0800 and appointment information is on the AVS. General questions answered. Michael Fournier verbalized understanding.       Will continue to follow for educational needs and enrollment in the Cardiac Rehab Program.     SALENA CARDONA RN

## 2025-04-24 ENCOUNTER — APPOINTMENT (OUTPATIENT)
Facility: HOSPITAL | Age: 45
DRG: 165 | End: 2025-04-24
Attending: INTERNAL MEDICINE
Payer: COMMERCIAL

## 2025-04-24 LAB
ABO + RH BLD: NORMAL
ANION GAP SERPL CALC-SCNC: 4 MMOL/L (ref 2–12)
APPEARANCE UR: CLEAR
BACTERIA URNS QL MICRO: NEGATIVE /HPF
BILIRUB UR QL: NEGATIVE
BLD PROD TYP BPU: NORMAL
BLD PROD TYP BPU: NORMAL
BLOOD BANK DISPENSE STATUS: NORMAL
BLOOD BANK DISPENSE STATUS: NORMAL
BLOOD GROUP ANTIBODIES SERPL: NORMAL
BPU ID: NORMAL
BPU ID: NORMAL
BUN SERPL-MCNC: 14 MG/DL (ref 6–20)
BUN/CREAT SERPL: 17 (ref 12–20)
CALCIUM SERPL-MCNC: 9.2 MG/DL (ref 8.5–10.1)
CHLORIDE SERPL-SCNC: 105 MMOL/L (ref 97–108)
CO2 SERPL-SCNC: 27 MMOL/L (ref 21–32)
COLOR UR: ABNORMAL
CREAT SERPL-MCNC: 0.83 MG/DL (ref 0.7–1.3)
CROSSMATCH RESULT: NORMAL
CROSSMATCH RESULT: NORMAL
EPITH CASTS URNS QL MICRO: ABNORMAL /LPF
ERYTHROCYTE [DISTWIDTH] IN BLOOD BY AUTOMATED COUNT: 15.9 % (ref 11.5–14.5)
GLUCOSE BLD STRIP.AUTO-MCNC: 104 MG/DL (ref 65–117)
GLUCOSE BLD STRIP.AUTO-MCNC: 106 MG/DL (ref 65–117)
GLUCOSE BLD STRIP.AUTO-MCNC: 115 MG/DL (ref 65–117)
GLUCOSE BLD STRIP.AUTO-MCNC: 126 MG/DL (ref 65–117)
GLUCOSE BLD STRIP.AUTO-MCNC: 138 MG/DL (ref 65–117)
GLUCOSE BLD STRIP.AUTO-MCNC: 139 MG/DL (ref 65–117)
GLUCOSE BLD STRIP.AUTO-MCNC: 152 MG/DL (ref 65–117)
GLUCOSE BLD STRIP.AUTO-MCNC: 154 MG/DL (ref 65–117)
GLUCOSE BLD STRIP.AUTO-MCNC: 156 MG/DL (ref 65–117)
GLUCOSE BLD STRIP.AUTO-MCNC: 79 MG/DL (ref 65–117)
GLUCOSE BLD STRIP.AUTO-MCNC: 85 MG/DL (ref 65–117)
GLUCOSE BLD STRIP.AUTO-MCNC: 91 MG/DL (ref 65–117)
GLUCOSE BLD STRIP.AUTO-MCNC: 91 MG/DL (ref 65–117)
GLUCOSE BLD STRIP.AUTO-MCNC: 93 MG/DL (ref 65–117)
GLUCOSE BLD STRIP.AUTO-MCNC: 97 MG/DL (ref 65–117)
GLUCOSE SERPL-MCNC: 80 MG/DL (ref 65–100)
GLUCOSE UR STRIP.AUTO-MCNC: NEGATIVE MG/DL
HCT VFR BLD AUTO: 29.6 % (ref 36.6–50.3)
HGB BLD-MCNC: 9.9 G/DL (ref 12.1–17)
HGB UR QL STRIP: NEGATIVE
HYALINE CASTS URNS QL MICRO: ABNORMAL /LPF (ref 0–2)
KETONES UR QL STRIP.AUTO: 15 MG/DL
LEUKOCYTE ESTERASE UR QL STRIP.AUTO: ABNORMAL
MAGNESIUM SERPL-MCNC: 2.5 MG/DL (ref 1.6–2.4)
MCH RBC QN AUTO: 30.4 PG (ref 26–34)
MCHC RBC AUTO-ENTMCNC: 33.4 G/DL (ref 30–36.5)
MCV RBC AUTO: 90.8 FL (ref 80–99)
NITRITE UR QL STRIP.AUTO: NEGATIVE
NRBC # BLD: 0 K/UL (ref 0–0.01)
NRBC BLD-RTO: 0 PER 100 WBC
PH UR STRIP: 5 (ref 5–8)
PLATELET # BLD AUTO: 200 K/UL (ref 150–400)
PMV BLD AUTO: 11.4 FL (ref 8.9–12.9)
POTASSIUM SERPL-SCNC: 3.8 MMOL/L (ref 3.5–5.1)
PROT UR STRIP-MCNC: 100 MG/DL
RBC # BLD AUTO: 3.26 M/UL (ref 4.1–5.7)
RBC #/AREA URNS HPF: ABNORMAL /HPF (ref 0–5)
SERVICE CMNT-IMP: ABNORMAL
SERVICE CMNT-IMP: NORMAL
SODIUM SERPL-SCNC: 136 MMOL/L (ref 136–145)
SP GR UR REFRACTOMETRY: 1.02
SPECIMEN EXP DATE BLD: NORMAL
UNIT DIVISION: 0
UNIT DIVISION: 0
URINE CULTURE IF INDICATED: ABNORMAL
UROBILINOGEN UR QL STRIP.AUTO: 0.2 EU/DL (ref 0.2–1)
WBC # BLD AUTO: 30.9 K/UL (ref 4.1–11.1)
WBC URNS QL MICRO: ABNORMAL /HPF (ref 0–4)

## 2025-04-24 PROCEDURE — 85027 COMPLETE CBC AUTOMATED: CPT

## 2025-04-24 PROCEDURE — 6360000002 HC RX W HCPCS: Performed by: INTERNAL MEDICINE

## 2025-04-24 PROCEDURE — 82962 GLUCOSE BLOOD TEST: CPT

## 2025-04-24 PROCEDURE — 36415 COLL VENOUS BLD VENIPUNCTURE: CPT

## 2025-04-24 PROCEDURE — 2060000000 HC ICU INTERMEDIATE R&B

## 2025-04-24 PROCEDURE — 6370000000 HC RX 637 (ALT 250 FOR IP)

## 2025-04-24 PROCEDURE — 97116 GAIT TRAINING THERAPY: CPT

## 2025-04-24 PROCEDURE — 6360000002 HC RX W HCPCS: Performed by: NURSE PRACTITIONER

## 2025-04-24 PROCEDURE — 6370000000 HC RX 637 (ALT 250 FOR IP): Performed by: INTERNAL MEDICINE

## 2025-04-24 PROCEDURE — 83735 ASSAY OF MAGNESIUM: CPT

## 2025-04-24 PROCEDURE — 97535 SELF CARE MNGMENT TRAINING: CPT

## 2025-04-24 PROCEDURE — 2500000003 HC RX 250 WO HCPCS: Performed by: INTERNAL MEDICINE

## 2025-04-24 PROCEDURE — 2500000003 HC RX 250 WO HCPCS: Performed by: PHYSICIAN ASSISTANT

## 2025-04-24 PROCEDURE — 6370000000 HC RX 637 (ALT 250 FOR IP): Performed by: PHYSICIAN ASSISTANT

## 2025-04-24 PROCEDURE — 71045 X-RAY EXAM CHEST 1 VIEW: CPT

## 2025-04-24 PROCEDURE — 6370000000 HC RX 637 (ALT 250 FOR IP): Performed by: NURSE PRACTITIONER

## 2025-04-24 PROCEDURE — 94640 AIRWAY INHALATION TREATMENT: CPT

## 2025-04-24 PROCEDURE — 97110 THERAPEUTIC EXERCISES: CPT

## 2025-04-24 PROCEDURE — 99231 SBSQ HOSP IP/OBS SF/LOW 25: CPT | Performed by: INTERNAL MEDICINE

## 2025-04-24 PROCEDURE — 80048 BASIC METABOLIC PNL TOTAL CA: CPT

## 2025-04-24 RX ORDER — INSULIN GLARGINE 100 [IU]/ML
40 INJECTION, SOLUTION SUBCUTANEOUS DAILY
Status: DISCONTINUED | OUTPATIENT
Start: 2025-04-24 | End: 2025-04-27 | Stop reason: HOSPADM

## 2025-04-24 RX ORDER — AMIODARONE HYDROCHLORIDE 200 MG/1
TABLET ORAL
Qty: 84 TABLET | Refills: 0 | Status: SHIPPED | OUTPATIENT
Start: 2025-04-24

## 2025-04-24 RX ORDER — SENNA AND DOCUSATE SODIUM 50; 8.6 MG/1; MG/1
1 TABLET, FILM COATED ORAL 2 TIMES DAILY
Qty: 28 TABLET | Refills: 0 | Status: SHIPPED | OUTPATIENT
Start: 2025-04-24 | End: 2025-05-08

## 2025-04-24 RX ORDER — TAMSULOSIN HYDROCHLORIDE 0.4 MG/1
0.4 CAPSULE ORAL DAILY
Qty: 30 CAPSULE | Refills: 1 | Status: SHIPPED | OUTPATIENT
Start: 2025-04-25

## 2025-04-24 RX ORDER — NICOTINE 21 MG/24HR
1 PATCH, TRANSDERMAL 24 HOURS TRANSDERMAL DAILY
Qty: 42 PATCH | Refills: 0 | Status: SHIPPED | OUTPATIENT
Start: 2025-04-25 | End: 2025-06-06

## 2025-04-24 RX ORDER — METOPROLOL TARTRATE 25 MG/1
12.5 TABLET, FILM COATED ORAL ONCE
Status: COMPLETED | OUTPATIENT
Start: 2025-04-24 | End: 2025-04-24

## 2025-04-24 RX ORDER — OXYCODONE HYDROCHLORIDE 5 MG/1
5 TABLET ORAL EVERY 8 HOURS PRN
Qty: 10 TABLET | Refills: 0 | Status: SHIPPED | OUTPATIENT
Start: 2025-04-24 | End: 2025-04-27

## 2025-04-24 RX ORDER — LIDOCAINE 4 G/G
2 PATCH TOPICAL DAILY
Qty: 10 EACH | Refills: 0 | Status: SHIPPED | OUTPATIENT
Start: 2025-04-25 | End: 2025-04-30

## 2025-04-24 RX ORDER — POTASSIUM CHLORIDE 1500 MG/1
40 TABLET, EXTENDED RELEASE ORAL ONCE
Status: COMPLETED | OUTPATIENT
Start: 2025-04-24 | End: 2025-04-24

## 2025-04-24 RX ORDER — INSULIN LISPRO 100 [IU]/ML
12 INJECTION, SOLUTION INTRAVENOUS; SUBCUTANEOUS
Status: DISCONTINUED | OUTPATIENT
Start: 2025-04-24 | End: 2025-04-27 | Stop reason: HOSPADM

## 2025-04-24 RX ORDER — POLYETHYLENE GLYCOL 3350 17 G/17G
17 POWDER, FOR SOLUTION ORAL DAILY
Qty: 14 PACKET | Refills: 0 | Status: SHIPPED | OUTPATIENT
Start: 2025-04-25 | End: 2025-05-09

## 2025-04-24 RX ORDER — DIPHENHYDRAMINE HCL 25 MG
25 CAPSULE ORAL EVERY 6 HOURS PRN
Status: SHIPPED | COMMUNITY
Start: 2025-04-24 | End: 2025-05-04

## 2025-04-24 RX ORDER — METOPROLOL TARTRATE 25 MG/1
37.5 TABLET, FILM COATED ORAL 2 TIMES DAILY
Status: DISCONTINUED | OUTPATIENT
Start: 2025-04-24 | End: 2025-04-25

## 2025-04-24 RX ADMIN — WATER 2000 MG: 1 INJECTION INTRAMUSCULAR; INTRAVENOUS; SUBCUTANEOUS at 01:36

## 2025-04-24 RX ADMIN — SENNOSIDES AND DOCUSATE SODIUM 1 TABLET: 50; 8.6 TABLET ORAL at 21:29

## 2025-04-24 RX ADMIN — METOPROLOL TARTRATE 12.5 MG: 25 TABLET, FILM COATED ORAL at 10:15

## 2025-04-24 RX ADMIN — INSULIN LISPRO 12 UNITS: 100 INJECTION, SOLUTION INTRAVENOUS; SUBCUTANEOUS at 18:07

## 2025-04-24 RX ADMIN — ARFORMOTEROL TARTRATE: 15 SOLUTION RESPIRATORY (INHALATION) at 08:00

## 2025-04-24 RX ADMIN — FAMOTIDINE 20 MG: 20 TABLET, FILM COATED ORAL at 08:51

## 2025-04-24 RX ADMIN — METRONIDAZOLE 500 MG: 500 INJECTION, SOLUTION INTRAVENOUS at 10:17

## 2025-04-24 RX ADMIN — METHOCARBAMOL TABLETS 750 MG: 500 TABLET, COATED ORAL at 21:29

## 2025-04-24 RX ADMIN — SODIUM CHLORIDE, PRESERVATIVE FREE 10 ML: 5 INJECTION INTRAVENOUS at 08:54

## 2025-04-24 RX ADMIN — ACETAMINOPHEN 1000 MG: 500 TABLET, FILM COATED ORAL at 18:11

## 2025-04-24 RX ADMIN — METRONIDAZOLE 500 MG: 500 INJECTION, SOLUTION INTRAVENOUS at 18:11

## 2025-04-24 RX ADMIN — TAMSULOSIN HYDROCHLORIDE 0.4 MG: 0.4 CAPSULE ORAL at 08:49

## 2025-04-24 RX ADMIN — WATER 2000 MG: 1 INJECTION INTRAMUSCULAR; INTRAVENOUS; SUBCUTANEOUS at 10:15

## 2025-04-24 RX ADMIN — AMIODARONE HYDROCHLORIDE 400 MG: 200 TABLET ORAL at 21:29

## 2025-04-24 RX ADMIN — ACETAMINOPHEN 1000 MG: 500 TABLET, FILM COATED ORAL at 06:10

## 2025-04-24 RX ADMIN — ARFORMOTEROL TARTRATE: 15 SOLUTION RESPIRATORY (INHALATION) at 20:32

## 2025-04-24 RX ADMIN — AMLODIPINE BESYLATE 5 MG: 5 TABLET ORAL at 08:49

## 2025-04-24 RX ADMIN — POLYETHYLENE GLYCOL 3350 17 G: 17 POWDER, FOR SOLUTION ORAL at 08:49

## 2025-04-24 RX ADMIN — INSULIN LISPRO 12 UNITS: 100 INJECTION, SOLUTION INTRAVENOUS; SUBCUTANEOUS at 12:36

## 2025-04-24 RX ADMIN — GABAPENTIN 200 MG: 100 CAPSULE ORAL at 21:29

## 2025-04-24 RX ADMIN — 0.12% CHLORHEXIDINE GLUCONATE 15 ML: 1.2 RINSE ORAL at 21:28

## 2025-04-24 RX ADMIN — GABAPENTIN 200 MG: 100 CAPSULE ORAL at 08:52

## 2025-04-24 RX ADMIN — SODIUM CHLORIDE, PRESERVATIVE FREE 10 ML: 5 INJECTION INTRAVENOUS at 21:30

## 2025-04-24 RX ADMIN — Medication 400 MG: at 21:29

## 2025-04-24 RX ADMIN — INSULIN LISPRO 2 UNITS: 100 INJECTION, SOLUTION INTRAVENOUS; SUBCUTANEOUS at 18:07

## 2025-04-24 RX ADMIN — SENNOSIDES AND DOCUSATE SODIUM 1 TABLET: 50; 8.6 TABLET ORAL at 08:51

## 2025-04-24 RX ADMIN — MUPIROCIN: 20 OINTMENT TOPICAL at 21:30

## 2025-04-24 RX ADMIN — INSULIN GLARGINE 40 UNITS: 100 INJECTION, SOLUTION SUBCUTANEOUS at 10:16

## 2025-04-24 RX ADMIN — POTASSIUM CHLORIDE 40 MEQ: 1500 TABLET, EXTENDED RELEASE ORAL at 10:15

## 2025-04-24 RX ADMIN — METOPROLOL TARTRATE 37.5 MG: 25 TABLET, FILM COATED ORAL at 21:28

## 2025-04-24 RX ADMIN — ATORVASTATIN CALCIUM 80 MG: 40 TABLET, FILM COATED ORAL at 21:28

## 2025-04-24 RX ADMIN — METRONIDAZOLE 500 MG: 500 INJECTION, SOLUTION INTRAVENOUS at 01:41

## 2025-04-24 RX ADMIN — FAMOTIDINE 20 MG: 20 TABLET, FILM COATED ORAL at 21:29

## 2025-04-24 RX ADMIN — ASPIRIN 81 MG: 81 TABLET, COATED ORAL at 08:49

## 2025-04-24 RX ADMIN — ACETAMINOPHEN 1000 MG: 500 TABLET, FILM COATED ORAL at 00:15

## 2025-04-24 RX ADMIN — AMIODARONE HYDROCHLORIDE 400 MG: 200 TABLET ORAL at 08:49

## 2025-04-24 RX ADMIN — 0.12% CHLORHEXIDINE GLUCONATE 15 ML: 1.2 RINSE ORAL at 08:49

## 2025-04-24 RX ADMIN — MUPIROCIN: 20 OINTMENT TOPICAL at 08:53

## 2025-04-24 RX ADMIN — COLCHICINE 0.6 MG: 0.6 TABLET, FILM COATED ORAL at 08:50

## 2025-04-24 RX ADMIN — WATER 2000 MG: 1 INJECTION INTRAMUSCULAR; INTRAVENOUS; SUBCUTANEOUS at 18:08

## 2025-04-24 RX ADMIN — GABAPENTIN 200 MG: 100 CAPSULE ORAL at 15:01

## 2025-04-24 RX ADMIN — METOPROLOL TARTRATE 25 MG: 25 TABLET, FILM COATED ORAL at 08:50

## 2025-04-24 ASSESSMENT — PAIN DESCRIPTION - LOCATION
LOCATION: CHEST
LOCATION: CHEST
LOCATION: INCISION;CHEST
LOCATION: INCISION;CHEST

## 2025-04-24 ASSESSMENT — PAIN SCALES - GENERAL
PAINLEVEL_OUTOF10: 2
PAINLEVEL_OUTOF10: 3
PAINLEVEL_OUTOF10: 3
PAINLEVEL_OUTOF10: 0

## 2025-04-24 ASSESSMENT — PAIN DESCRIPTION - ORIENTATION
ORIENTATION: ANTERIOR
ORIENTATION: ANTERIOR

## 2025-04-24 ASSESSMENT — PAIN DESCRIPTION - PAIN TYPE: TYPE: SURGICAL PAIN

## 2025-04-24 ASSESSMENT — PAIN DESCRIPTION - DESCRIPTORS
DESCRIPTORS: SORE
DESCRIPTORS: SORE
DESCRIPTORS: ACHING

## 2025-04-24 NOTE — DISCHARGE INSTRUCTIONS
[facility]     [address] [facility]                        [address] [place] [address] I  [address]                                                           [address]  [place] [address]                                       [place] [address]  Office- [phone]  Fax- [phone]        Office- [phone]  Fax- [phone]  _________________________________________________________________  Dr. Slava Skinner, MICHAEL Singer, AMOR Camacho, NP                    Dr. Jordan Wilder, AMOR [address] AMOR Monter[address] Dr[address] MICHAEL Petersen NP                                                                                                                                            Name:Michael Fournier     Surgery & Date: 4/21/25    Discharge Date: [unfilled]     MEDICATIONS:  Please refer to your After Visit Summary for your medication list. If you do not have a prescription for a new medication, you may purchase the medication over the counter.   DO NOT TAKE ANY MEDICATIONS THAT ARE NOT ON THIS LIST      INSTRUCTIONS:  NO SMOKING OR TOBACCO PRODUCTS  Follow all the instructions in your discharge book  Shower daily. Wash all incisions twice daily. Once in the shower with Dial soap and water, and once over the sink with Dynahex 4 solution and rinse. Do this for 14 days. No lotions, ointments or powder.  Call the office immediately for any redness, swelling, or drainage from your incision.   Take your temperature daily and call for a temperature of 101 degrees or higher or for any symptoms that make you think you have and infection.  Weigh yourself each morning.  Call if you gain more than 5 pounds in 48 hours.  Use the incentive spirometer 6-8

## 2025-04-24 NOTE — DIABETES MGMT
This is a 45-year-old male with a history of type 2 diabetes mellitus, coronary artery disease status post MI x 3 with multiple PCI, and hypertension who presented to [facility] on 4/11/2025 with complaints of substernal chest pain.  Admission labs notable for glucose 245, A1c 10.3%, creatinine 0.92, bicarb 24, normal troponins.  He was transferred to [facility] where he underwent repeat cardiac catheterization revealing multivessel disease.  He did receive multiple doses of prednisone for dye allergy.  He did undergo a CABG x 2 on April 21, 2025 we are asked to assist in glucose management.     Patient tells me that he has had diabetes for 10 years and for the most part has been on Lantus and Humalog.  He is followed at [facility] by endocrinology and their most recent note indicates a regimen of 50 units of Lantus, Humalog 4 units with meals (filed once 12/14), Farxiga 5 mg and Trulicity 0.75 mg weekly.  He claims adherence to the medical regimen.  He tells me today that he previously drank a lot of sodas but within the last month has switched to 0 sugar sodas.  He lives with his mother who also has diabetes.  A1C has been in double digits, ranged 11.2-12.3% (currently 11.2%).      The patient was treated with subcutaneous insulin and then transition to insulin infusion on 4/20/2025.  He is now status post CABG x 3 on 4/21/2025.  He has remained on the insulin infusion for post-op management of hyperglycemia with very high insulin rates.  There was a significant increase in insulin requirements following p.o. intake yesterday but this has resolved.  Current insulin infusion 1.4 to 2.5 units an hour.    Recent laboratory data  Glucose 126 (range )  Creatinine 0.83  Bicarb 27    Impression  1.  Type 2 diabetes mellitus without admission A1c of 10.3%  2.  Coronary artery disease status post CABG x 3 on 4/21/2025    Plan:  1.  I believe the patient is now ready for transition off

## 2025-04-24 NOTE — DISCHARGE SUMMARY
[facility] Discharge Summary     Patient ID:  Michael Fournier  [id number]   45 y.o.  1980    Admit date: 4/11/2025    Discharge date: 4/29/2025     Admitting Physician: Yair Michaels DO     Referring Cardiologist:      PCP:  Marbella Perkins APRN - NP    Admitting Diagnoses:  NSTEMI/MVCAD s/p prior stents    Discharge Diagnoses:   As above s/p CABG   Left submandibular abscess s/p FNA and I&D    Discharged Condition: Good and Stable    Disposition: home, see patient instructions for treatment and plan    Procedures for this admission:  Procedure(s):  ON-PUMP CORONARY ARTERY BYPASS GRAFTING TIMES TWO WITH LEFT INTERNAL MAMMARY ARTERY, ENDOSCOPIC HARVESTING OF THE RIGHT GREATER SAPHENOUS VEIN, AND LEFT ATRIAL APPENDAGE LIGATION (E.R.A.S.). LATOYA BY DR. HAUSER.    Discharge Medications:      Medication List        START taking these medications      amiodarone 200 MG tablet  Commonly known as: CORDARONE  Take two tablets twice daily x 2 weeks, then take two tablets once daily x 2 weeks, then STOP     colchicine 0.6 MG tablet  Commonly known as: COLCRYS  Take 1 tablet by mouth daily     Dexcom G7 Sensor Misc  Utilize to monitor blood sugars 3-4 times daily     diphenhydrAMINE 25 MG capsule  Commonly known as: BENADRYL  Take 1 capsule by mouth every 6 hours as needed for Itching or Allergies     insulin lispro (1 Unit Dial) 100 UNIT/ML Sopn  Commonly known as: HumaLOG KwikPen  Inject 10 Units into the skin 3 times daily (before meals)     Insulin Pen Needle 32G X 4 MM Misc  1 each by Does not apply route daily     lidocaine 4 % external patch  Apply 2 patches topically daily for 5 days     nicotine 21 MG/24HR  Commonly known as: NICODERM CQ  Place 1 patch onto the skin daily Follow up to assess need for taper     polyethylene glycol 17 g packet  Commonly known as: GLYCOLAX  Take 1 packet by mouth daily for 14 days Hold for loose stools     sennosides-docusate sodium 8.6-50 MG tablet  Commonly known as: SENOKOT-S  Take

## 2025-04-25 ENCOUNTER — APPOINTMENT (OUTPATIENT)
Facility: HOSPITAL | Age: 45
DRG: 165 | End: 2025-04-25
Attending: INTERNAL MEDICINE
Payer: COMMERCIAL

## 2025-04-25 LAB
ANION GAP SERPL CALC-SCNC: 7 MMOL/L (ref 2–12)
BUN SERPL-MCNC: 15 MG/DL (ref 6–20)
BUN/CREAT SERPL: 20 (ref 12–20)
CALCIUM SERPL-MCNC: 8.2 MG/DL (ref 8.5–10.1)
CHLORIDE SERPL-SCNC: 104 MMOL/L (ref 97–108)
CO2 SERPL-SCNC: 25 MMOL/L (ref 21–32)
CREAT SERPL-MCNC: 0.74 MG/DL (ref 0.7–1.3)
EKG DIAGNOSIS: NORMAL
EKG Q-T INTERVAL: 272 MS
EKG QRS DURATION: 86 MS
EKG QTC CALCULATION (BAZETT): 466 MS
EKG R AXIS: -8 DEGREES
EKG T AXIS: -21 DEGREES
EKG VENTRICULAR RATE: 177 BPM
ERYTHROCYTE [DISTWIDTH] IN BLOOD BY AUTOMATED COUNT: 15.9 % (ref 11.5–14.5)
GLUCOSE BLD STRIP.AUTO-MCNC: 162 MG/DL (ref 65–117)
GLUCOSE BLD STRIP.AUTO-MCNC: 166 MG/DL (ref 65–117)
GLUCOSE BLD STRIP.AUTO-MCNC: 170 MG/DL (ref 65–117)
GLUCOSE BLD STRIP.AUTO-MCNC: 192 MG/DL (ref 65–117)
GLUCOSE SERPL-MCNC: 115 MG/DL (ref 65–100)
HCT VFR BLD AUTO: 27.9 % (ref 36.6–50.3)
HGB BLD-MCNC: 9.2 G/DL (ref 12.1–17)
MAGNESIUM SERPL-MCNC: 2.1 MG/DL (ref 1.6–2.4)
MCH RBC QN AUTO: 29.8 PG (ref 26–34)
MCHC RBC AUTO-ENTMCNC: 33 G/DL (ref 30–36.5)
MCV RBC AUTO: 90.3 FL (ref 80–99)
NRBC # BLD: 0 K/UL (ref 0–0.01)
NRBC BLD-RTO: 0 PER 100 WBC
PLATELET # BLD AUTO: 237 K/UL (ref 150–400)
PMV BLD AUTO: 10.7 FL (ref 8.9–12.9)
POTASSIUM SERPL-SCNC: 4 MMOL/L (ref 3.5–5.1)
RBC # BLD AUTO: 3.09 M/UL (ref 4.1–5.7)
SERVICE CMNT-IMP: ABNORMAL
SODIUM SERPL-SCNC: 136 MMOL/L (ref 136–145)
WBC # BLD AUTO: 23.4 K/UL (ref 4.1–11.1)

## 2025-04-25 PROCEDURE — 82962 GLUCOSE BLOOD TEST: CPT

## 2025-04-25 PROCEDURE — 80048 BASIC METABOLIC PNL TOTAL CA: CPT

## 2025-04-25 PROCEDURE — 94640 AIRWAY INHALATION TREATMENT: CPT

## 2025-04-25 PROCEDURE — 85027 COMPLETE CBC AUTOMATED: CPT

## 2025-04-25 PROCEDURE — 2500000003 HC RX 250 WO HCPCS: Performed by: PHYSICIAN ASSISTANT

## 2025-04-25 PROCEDURE — 6370000000 HC RX 637 (ALT 250 FOR IP): Performed by: NURSE PRACTITIONER

## 2025-04-25 PROCEDURE — 71045 X-RAY EXAM CHEST 1 VIEW: CPT

## 2025-04-25 PROCEDURE — 97110 THERAPEUTIC EXERCISES: CPT

## 2025-04-25 PROCEDURE — 6370000000 HC RX 637 (ALT 250 FOR IP): Performed by: CLINICAL NURSE SPECIALIST

## 2025-04-25 PROCEDURE — 6370000000 HC RX 637 (ALT 250 FOR IP)

## 2025-04-25 PROCEDURE — 6370000000 HC RX 637 (ALT 250 FOR IP): Performed by: PHYSICIAN ASSISTANT

## 2025-04-25 PROCEDURE — 83735 ASSAY OF MAGNESIUM: CPT

## 2025-04-25 PROCEDURE — 2060000000 HC ICU INTERMEDIATE R&B

## 2025-04-25 PROCEDURE — 6360000002 HC RX W HCPCS: Performed by: PHYSICIAN ASSISTANT

## 2025-04-25 PROCEDURE — 97116 GAIT TRAINING THERAPY: CPT

## 2025-04-25 PROCEDURE — 97535 SELF CARE MNGMENT TRAINING: CPT

## 2025-04-25 PROCEDURE — 2500000003 HC RX 250 WO HCPCS

## 2025-04-25 PROCEDURE — 36415 COLL VENOUS BLD VENIPUNCTURE: CPT

## 2025-04-25 PROCEDURE — 6360000002 HC RX W HCPCS: Performed by: INTERNAL MEDICINE

## 2025-04-25 PROCEDURE — 2580000003 HC RX 258: Performed by: PHYSICIAN ASSISTANT

## 2025-04-25 PROCEDURE — P9045 ALBUMIN (HUMAN), 5%, 250 ML: HCPCS | Performed by: PHYSICIAN ASSISTANT

## 2025-04-25 PROCEDURE — 6360000002 HC RX W HCPCS: Performed by: THORACIC SURGERY (CARDIOTHORACIC VASCULAR SURGERY)

## 2025-04-25 PROCEDURE — 99232 SBSQ HOSP IP/OBS MODERATE 35: CPT | Performed by: CLINICAL NURSE SPECIALIST

## 2025-04-25 PROCEDURE — 6360000002 HC RX W HCPCS: Performed by: NURSE PRACTITIONER

## 2025-04-25 PROCEDURE — 2500000003 HC RX 250 WO HCPCS: Performed by: INTERNAL MEDICINE

## 2025-04-25 RX ORDER — METOPROLOL TARTRATE 1 MG/ML
5 INJECTION, SOLUTION INTRAVENOUS ONCE
Status: COMPLETED | OUTPATIENT
Start: 2025-04-25 | End: 2025-04-25

## 2025-04-25 RX ORDER — ACYCLOVIR 400 MG/1
TABLET ORAL
Qty: 3 EACH | Refills: 3 | Status: SHIPPED | OUTPATIENT
Start: 2025-04-25

## 2025-04-25 RX ORDER — ALBUMIN HUMAN 50 G/1000ML
12.5 SOLUTION INTRAVENOUS ONCE
Status: COMPLETED | OUTPATIENT
Start: 2025-04-25 | End: 2025-04-25

## 2025-04-25 RX ORDER — METOPROLOL TARTRATE 50 MG
50 TABLET ORAL 2 TIMES DAILY
Status: DISCONTINUED | OUTPATIENT
Start: 2025-04-25 | End: 2025-04-27 | Stop reason: HOSPADM

## 2025-04-25 RX ORDER — INSULIN LISPRO 100 [IU]/ML
10 INJECTION, SOLUTION INTRAVENOUS; SUBCUTANEOUS
Qty: 5 ADJUSTABLE DOSE PRE-FILLED PEN SYRINGE | Refills: 3 | Status: SHIPPED | OUTPATIENT
Start: 2025-04-25

## 2025-04-25 RX ORDER — METOPROLOL TARTRATE 25 MG/1
12.5 TABLET, FILM COATED ORAL ONCE
Status: COMPLETED | OUTPATIENT
Start: 2025-04-25 | End: 2025-04-25

## 2025-04-25 RX ADMIN — INSULIN LISPRO 2 UNITS: 100 INJECTION, SOLUTION INTRAVENOUS; SUBCUTANEOUS at 13:59

## 2025-04-25 RX ADMIN — ARFORMOTEROL TARTRATE: 15 SOLUTION RESPIRATORY (INHALATION) at 08:10

## 2025-04-25 RX ADMIN — INSULIN LISPRO 12 UNITS: 100 INJECTION, SOLUTION INTRAVENOUS; SUBCUTANEOUS at 17:30

## 2025-04-25 RX ADMIN — Medication 400 MG: at 08:56

## 2025-04-25 RX ADMIN — Medication 400 MG: at 20:43

## 2025-04-25 RX ADMIN — COLCHICINE 0.6 MG: 0.6 TABLET, FILM COATED ORAL at 08:55

## 2025-04-25 RX ADMIN — ATORVASTATIN CALCIUM 80 MG: 40 TABLET, FILM COATED ORAL at 20:44

## 2025-04-25 RX ADMIN — FAMOTIDINE 20 MG: 20 TABLET, FILM COATED ORAL at 20:44

## 2025-04-25 RX ADMIN — INSULIN LISPRO 2 UNITS: 100 INJECTION, SOLUTION INTRAVENOUS; SUBCUTANEOUS at 10:33

## 2025-04-25 RX ADMIN — INSULIN GLARGINE 40 UNITS: 100 INJECTION, SOLUTION SUBCUTANEOUS at 09:30

## 2025-04-25 RX ADMIN — AMIODARONE HYDROCHLORIDE 150 MG: 1.5 INJECTION, SOLUTION INTRAVENOUS at 06:50

## 2025-04-25 RX ADMIN — SODIUM CHLORIDE, PRESERVATIVE FREE 10 ML: 5 INJECTION INTRAVENOUS at 09:00

## 2025-04-25 RX ADMIN — METHOCARBAMOL TABLETS 750 MG: 500 TABLET, COATED ORAL at 20:42

## 2025-04-25 RX ADMIN — FAMOTIDINE 20 MG: 20 TABLET, FILM COATED ORAL at 08:52

## 2025-04-25 RX ADMIN — INSULIN LISPRO 1 UNITS: 100 INJECTION, SOLUTION INTRAVENOUS; SUBCUTANEOUS at 20:50

## 2025-04-25 RX ADMIN — METOPROLOL TARTRATE 50 MG: 50 TABLET, FILM COATED ORAL at 20:45

## 2025-04-25 RX ADMIN — METOPROLOL TARTRATE 12.5 MG: 25 TABLET, FILM COATED ORAL at 10:28

## 2025-04-25 RX ADMIN — AMIODARONE HYDROCHLORIDE 400 MG: 200 TABLET ORAL at 08:53

## 2025-04-25 RX ADMIN — ARFORMOTEROL TARTRATE: 15 SOLUTION RESPIRATORY (INHALATION) at 19:40

## 2025-04-25 RX ADMIN — 0.12% CHLORHEXIDINE GLUCONATE 15 ML: 1.2 RINSE ORAL at 20:54

## 2025-04-25 RX ADMIN — SODIUM CHLORIDE, PRESERVATIVE FREE 10 ML: 5 INJECTION INTRAVENOUS at 20:42

## 2025-04-25 RX ADMIN — HYDROMORPHONE HYDROCHLORIDE 0.5 MG: 1 INJECTION, SOLUTION INTRAMUSCULAR; INTRAVENOUS; SUBCUTANEOUS at 06:40

## 2025-04-25 RX ADMIN — METRONIDAZOLE 500 MG: 500 INJECTION, SOLUTION INTRAVENOUS at 12:00

## 2025-04-25 RX ADMIN — METRONIDAZOLE 500 MG: 500 INJECTION, SOLUTION INTRAVENOUS at 17:41

## 2025-04-25 RX ADMIN — INSULIN LISPRO 2 UNITS: 100 INJECTION, SOLUTION INTRAVENOUS; SUBCUTANEOUS at 17:20

## 2025-04-25 RX ADMIN — SENNOSIDES AND DOCUSATE SODIUM 1 TABLET: 50; 8.6 TABLET ORAL at 08:52

## 2025-04-25 RX ADMIN — AMIODARONE HYDROCHLORIDE 400 MG: 200 TABLET ORAL at 20:44

## 2025-04-25 RX ADMIN — WATER 2000 MG: 1 INJECTION INTRAMUSCULAR; INTRAVENOUS; SUBCUTANEOUS at 11:30

## 2025-04-25 RX ADMIN — ALBUMIN (HUMAN) 12.5 G: 12.5 INJECTION, SOLUTION INTRAVENOUS at 10:15

## 2025-04-25 RX ADMIN — ASPIRIN 81 MG: 81 TABLET, COATED ORAL at 08:56

## 2025-04-25 RX ADMIN — 0.12% CHLORHEXIDINE GLUCONATE 15 ML: 1.2 RINSE ORAL at 09:05

## 2025-04-25 RX ADMIN — METOPROLOL TARTRATE 37.5 MG: 25 TABLET, FILM COATED ORAL at 08:57

## 2025-04-25 RX ADMIN — GABAPENTIN 200 MG: 100 CAPSULE ORAL at 20:49

## 2025-04-25 RX ADMIN — AMIODARONE HYDROCHLORIDE 1 MG/MIN: 50 INJECTION, SOLUTION INTRAVENOUS at 10:27

## 2025-04-25 RX ADMIN — SENNOSIDES AND DOCUSATE SODIUM 1 TABLET: 50; 8.6 TABLET ORAL at 20:44

## 2025-04-25 RX ADMIN — MUPIROCIN: 20 OINTMENT TOPICAL at 20:40

## 2025-04-25 RX ADMIN — ONDANSETRON 4 MG: 2 INJECTION, SOLUTION INTRAMUSCULAR; INTRAVENOUS at 10:17

## 2025-04-25 RX ADMIN — AMIODARONE HYDROCHLORIDE 150 MG: 1.5 INJECTION, SOLUTION INTRAVENOUS at 10:08

## 2025-04-25 RX ADMIN — INSULIN LISPRO 12 UNITS: 100 INJECTION, SOLUTION INTRAVENOUS; SUBCUTANEOUS at 10:33

## 2025-04-25 RX ADMIN — WATER 2000 MG: 1 INJECTION INTRAMUSCULAR; INTRAVENOUS; SUBCUTANEOUS at 18:37

## 2025-04-25 RX ADMIN — ACETAMINOPHEN 1000 MG: 500 TABLET, FILM COATED ORAL at 00:06

## 2025-04-25 RX ADMIN — MUPIROCIN: 20 OINTMENT TOPICAL at 09:01

## 2025-04-25 RX ADMIN — METOPROLOL TARTRATE 5 MG: 5 INJECTION INTRAVENOUS at 06:59

## 2025-04-25 RX ADMIN — ACETAMINOPHEN 1000 MG: 500 TABLET, FILM COATED ORAL at 06:22

## 2025-04-25 RX ADMIN — TAMSULOSIN HYDROCHLORIDE 0.4 MG: 0.4 CAPSULE ORAL at 10:32

## 2025-04-25 RX ADMIN — POLYETHYLENE GLYCOL 3350 17 G: 17 POWDER, FOR SOLUTION ORAL at 08:55

## 2025-04-25 RX ADMIN — METRONIDAZOLE 500 MG: 500 INJECTION, SOLUTION INTRAVENOUS at 02:21

## 2025-04-25 RX ADMIN — INSULIN LISPRO 12 UNITS: 100 INJECTION, SOLUTION INTRAVENOUS; SUBCUTANEOUS at 13:58

## 2025-04-25 RX ADMIN — AMIODARONE HYDROCHLORIDE 1 MG/MIN: 50 INJECTION, SOLUTION INTRAVENOUS at 18:32

## 2025-04-25 RX ADMIN — AMLODIPINE BESYLATE 5 MG: 5 TABLET ORAL at 08:52

## 2025-04-25 RX ADMIN — WATER 2000 MG: 1 INJECTION INTRAMUSCULAR; INTRAVENOUS; SUBCUTANEOUS at 02:19

## 2025-04-25 ASSESSMENT — PAIN SCALES - GENERAL
PAINLEVEL_OUTOF10: 0
PAINLEVEL_OUTOF10: 9
PAINLEVEL_OUTOF10: 0

## 2025-04-25 ASSESSMENT — PAIN DESCRIPTION - LOCATION: LOCATION: CHEST

## 2025-04-25 NOTE — DIABETES MGMT
This is a 45-year-old male with a history of type 2 diabetes mellitus, coronary artery disease status post MI x 3 with multiple PCI, and hypertension who presented to [facility] on 4/11/2025 with complaints of substernal chest pain.  Admission labs notable for glucose 245, A1c 10.3%, creatinine 0.92, bicarb 24, normal troponins.  He was transferred to [facility] where he underwent repeat cardiac catheterization revealing multivessel disease.  He did receive multiple doses of prednisone for dye allergy.  He did undergo a CABG x 2 on April 21, 2025 we are asked to assist in glucose management.     Patient tells me that he has had diabetes for 10 years and for the most part has been on Lantus and Humalog.  [facility] by [facility]inology and their most recent note indicates a regimen of 50 units of Lantus, Humalog 4 units with meals (filed once 12/14), Farxiga 5 mg and Trulicity 0.75 mg weekly.  He claims adherence to the medical regimen.  He tells me today that he previously drank a lot of sodas but within the last month has switched to 0 sugar sodas.  He lives with his mother who also has diabetes.  A1C has been in double digits, ranged 11.2-12.3% (currently 11.2%).      4/25: Patient was successfully transitioned off insulin infusion 4/24 with basal, bolus, and corrective insulin regimen.  Patient up sitting in bedside chair this AM.  Re-inforced importance of diet adjustments when it comes to starches, and fried foods.  He admits his diet has not been what it should be.  He also admits to skipping insulin doses at times.  He did verify his PTA diabetes medications listed above.  He has been on Trulicity for about 1 mo.      Recent laboratory data  Glucose 126 (range )  Creatinine 0.83  Bicarb 27    Impression  1.  Type 2 diabetes mellitus without admission A1c of 10.3%  2.  Coronary artery disease status post CABG x 3 on 4/21/2025    Plan:  1.  I believe the

## 2025-04-25 NOTE — PROCEDURES
Patient has not used pacing wires since surgery, HR stable after initiation of metoprolol. Patient V-wires discontinued via cutting of wires. Skin cleaned with chlorohexidine swab, pressure applied to pull wires taut and skin pushed down using scissors. Wires cut, skin released back over remaining wire ends. Patient tolerated well, hemodynamically stable at end of procedure.     Yudith Shell PA-C

## 2025-04-26 ENCOUNTER — APPOINTMENT (OUTPATIENT)
Facility: HOSPITAL | Age: 45
DRG: 165 | End: 2025-04-26
Attending: INTERNAL MEDICINE
Payer: COMMERCIAL

## 2025-04-26 LAB
ANION GAP SERPL CALC-SCNC: 7 MMOL/L (ref 2–12)
BUN SERPL-MCNC: 13 MG/DL (ref 6–20)
BUN/CREAT SERPL: 16 (ref 12–20)
CALCIUM SERPL-MCNC: 8.3 MG/DL (ref 8.5–10.1)
CHLORIDE SERPL-SCNC: 103 MMOL/L (ref 97–108)
CO2 SERPL-SCNC: 25 MMOL/L (ref 21–32)
CREAT SERPL-MCNC: 0.8 MG/DL (ref 0.7–1.3)
ERYTHROCYTE [DISTWIDTH] IN BLOOD BY AUTOMATED COUNT: 15.8 % (ref 11.5–14.5)
GLUCOSE BLD STRIP.AUTO-MCNC: 165 MG/DL (ref 65–117)
GLUCOSE BLD STRIP.AUTO-MCNC: 184 MG/DL (ref 65–117)
GLUCOSE BLD STRIP.AUTO-MCNC: 198 MG/DL (ref 65–117)
GLUCOSE BLD STRIP.AUTO-MCNC: 201 MG/DL (ref 65–117)
GLUCOSE SERPL-MCNC: 149 MG/DL (ref 65–100)
HCT VFR BLD AUTO: 26.8 % (ref 36.6–50.3)
HGB BLD-MCNC: 8.9 G/DL (ref 12.1–17)
MAGNESIUM SERPL-MCNC: 2.2 MG/DL (ref 1.6–2.4)
MCH RBC QN AUTO: 30 PG (ref 26–34)
MCHC RBC AUTO-ENTMCNC: 33.2 G/DL (ref 30–36.5)
MCV RBC AUTO: 90.2 FL (ref 80–99)
NRBC # BLD: 0 K/UL (ref 0–0.01)
NRBC BLD-RTO: 0 PER 100 WBC
PLATELET # BLD AUTO: 271 K/UL (ref 150–400)
PMV BLD AUTO: 10.2 FL (ref 8.9–12.9)
POTASSIUM SERPL-SCNC: 3.7 MMOL/L (ref 3.5–5.1)
RBC # BLD AUTO: 2.97 M/UL (ref 4.1–5.7)
SERVICE CMNT-IMP: ABNORMAL
SODIUM SERPL-SCNC: 135 MMOL/L (ref 136–145)
WBC # BLD AUTO: 16.6 K/UL (ref 4.1–11.1)

## 2025-04-26 PROCEDURE — 2060000000 HC ICU INTERMEDIATE R&B

## 2025-04-26 PROCEDURE — 6360000002 HC RX W HCPCS: Performed by: INTERNAL MEDICINE

## 2025-04-26 PROCEDURE — 2580000003 HC RX 258: Performed by: PHYSICIAN ASSISTANT

## 2025-04-26 PROCEDURE — 6370000000 HC RX 637 (ALT 250 FOR IP): Performed by: CLINICAL NURSE SPECIALIST

## 2025-04-26 PROCEDURE — 36415 COLL VENOUS BLD VENIPUNCTURE: CPT

## 2025-04-26 PROCEDURE — 83735 ASSAY OF MAGNESIUM: CPT

## 2025-04-26 PROCEDURE — 94640 AIRWAY INHALATION TREATMENT: CPT

## 2025-04-26 PROCEDURE — 6370000000 HC RX 637 (ALT 250 FOR IP)

## 2025-04-26 PROCEDURE — 85027 COMPLETE CBC AUTOMATED: CPT

## 2025-04-26 PROCEDURE — 6370000000 HC RX 637 (ALT 250 FOR IP): Performed by: NURSE PRACTITIONER

## 2025-04-26 PROCEDURE — 6360000002 HC RX W HCPCS: Performed by: PHYSICIAN ASSISTANT

## 2025-04-26 PROCEDURE — 6370000000 HC RX 637 (ALT 250 FOR IP): Performed by: PHYSICIAN ASSISTANT

## 2025-04-26 PROCEDURE — 71045 X-RAY EXAM CHEST 1 VIEW: CPT

## 2025-04-26 PROCEDURE — 82962 GLUCOSE BLOOD TEST: CPT

## 2025-04-26 PROCEDURE — 6360000002 HC RX W HCPCS: Performed by: THORACIC SURGERY (CARDIOTHORACIC VASCULAR SURGERY)

## 2025-04-26 PROCEDURE — 2500000003 HC RX 250 WO HCPCS: Performed by: INTERNAL MEDICINE

## 2025-04-26 PROCEDURE — 2500000003 HC RX 250 WO HCPCS: Performed by: PHYSICIAN ASSISTANT

## 2025-04-26 PROCEDURE — 80048 BASIC METABOLIC PNL TOTAL CA: CPT

## 2025-04-26 RX ADMIN — ACETAMINOPHEN 1000 MG: 500 TABLET, FILM COATED ORAL at 22:51

## 2025-04-26 RX ADMIN — INSULIN LISPRO 4 UNITS: 100 INJECTION, SOLUTION INTRAVENOUS; SUBCUTANEOUS at 11:58

## 2025-04-26 RX ADMIN — INSULIN GLARGINE 40 UNITS: 100 INJECTION, SOLUTION SUBCUTANEOUS at 08:00

## 2025-04-26 RX ADMIN — AMIODARONE HYDROCHLORIDE 400 MG: 200 TABLET ORAL at 08:01

## 2025-04-26 RX ADMIN — Medication 400 MG: at 08:01

## 2025-04-26 RX ADMIN — ACETAMINOPHEN 1000 MG: 500 TABLET, FILM COATED ORAL at 00:59

## 2025-04-26 RX ADMIN — TAMSULOSIN HYDROCHLORIDE 0.4 MG: 0.4 CAPSULE ORAL at 08:01

## 2025-04-26 RX ADMIN — MUPIROCIN: 20 OINTMENT TOPICAL at 08:04

## 2025-04-26 RX ADMIN — METRONIDAZOLE 500 MG: 500 INJECTION, SOLUTION INTRAVENOUS at 02:00

## 2025-04-26 RX ADMIN — INSULIN LISPRO 2 UNITS: 100 INJECTION, SOLUTION INTRAVENOUS; SUBCUTANEOUS at 17:25

## 2025-04-26 RX ADMIN — FAMOTIDINE 20 MG: 20 TABLET, FILM COATED ORAL at 08:03

## 2025-04-26 RX ADMIN — DIPHENHYDRAMINE HYDROCHLORIDE 25 MG: 25 CAPSULE ORAL at 22:51

## 2025-04-26 RX ADMIN — AMIODARONE HYDROCHLORIDE 1 MG/MIN: 50 INJECTION, SOLUTION INTRAVENOUS at 01:52

## 2025-04-26 RX ADMIN — ACETAMINOPHEN 1000 MG: 500 TABLET, FILM COATED ORAL at 11:58

## 2025-04-26 RX ADMIN — ATORVASTATIN CALCIUM 80 MG: 40 TABLET, FILM COATED ORAL at 21:04

## 2025-04-26 RX ADMIN — WATER 2000 MG: 1 INJECTION INTRAMUSCULAR; INTRAVENOUS; SUBCUTANEOUS at 10:37

## 2025-04-26 RX ADMIN — COLCHICINE 0.6 MG: 0.6 TABLET, FILM COATED ORAL at 08:01

## 2025-04-26 RX ADMIN — ARFORMOTEROL TARTRATE: 15 SOLUTION RESPIRATORY (INHALATION) at 20:42

## 2025-04-26 RX ADMIN — AMLODIPINE BESYLATE 5 MG: 5 TABLET ORAL at 08:03

## 2025-04-26 RX ADMIN — GABAPENTIN 200 MG: 100 CAPSULE ORAL at 08:01

## 2025-04-26 RX ADMIN — WATER 2000 MG: 1 INJECTION INTRAMUSCULAR; INTRAVENOUS; SUBCUTANEOUS at 03:28

## 2025-04-26 RX ADMIN — METHOCARBAMOL TABLETS 750 MG: 500 TABLET, COATED ORAL at 08:02

## 2025-04-26 RX ADMIN — INSULIN LISPRO 12 UNITS: 100 INJECTION, SOLUTION INTRAVENOUS; SUBCUTANEOUS at 11:58

## 2025-04-26 RX ADMIN — GABAPENTIN 200 MG: 100 CAPSULE ORAL at 13:04

## 2025-04-26 RX ADMIN — GABAPENTIN 200 MG: 100 CAPSULE ORAL at 21:04

## 2025-04-26 RX ADMIN — ACETAMINOPHEN 1000 MG: 500 TABLET, FILM COATED ORAL at 17:24

## 2025-04-26 RX ADMIN — ARFORMOTEROL TARTRATE: 15 SOLUTION RESPIRATORY (INHALATION) at 08:35

## 2025-04-26 RX ADMIN — SODIUM CHLORIDE, PRESERVATIVE FREE 10 ML: 5 INJECTION INTRAVENOUS at 21:06

## 2025-04-26 RX ADMIN — 0.12% CHLORHEXIDINE GLUCONATE 15 ML: 1.2 RINSE ORAL at 20:59

## 2025-04-26 RX ADMIN — METOPROLOL TARTRATE 50 MG: 50 TABLET, FILM COATED ORAL at 21:03

## 2025-04-26 RX ADMIN — DIPHENHYDRAMINE HYDROCHLORIDE 25 MG: 25 CAPSULE ORAL at 00:59

## 2025-04-26 RX ADMIN — METHOCARBAMOL TABLETS 750 MG: 500 TABLET, COATED ORAL at 12:00

## 2025-04-26 RX ADMIN — ASPIRIN 81 MG: 81 TABLET, COATED ORAL at 08:03

## 2025-04-26 RX ADMIN — METHOCARBAMOL TABLETS 750 MG: 500 TABLET, COATED ORAL at 21:03

## 2025-04-26 RX ADMIN — METOPROLOL TARTRATE 50 MG: 50 TABLET, FILM COATED ORAL at 08:03

## 2025-04-26 RX ADMIN — ACETAMINOPHEN 1000 MG: 500 TABLET, FILM COATED ORAL at 05:59

## 2025-04-26 RX ADMIN — Medication 400 MG: at 21:03

## 2025-04-26 RX ADMIN — METHOCARBAMOL TABLETS 750 MG: 500 TABLET, COATED ORAL at 17:24

## 2025-04-26 RX ADMIN — AMIODARONE HYDROCHLORIDE 400 MG: 200 TABLET ORAL at 20:59

## 2025-04-26 RX ADMIN — INSULIN LISPRO 12 UNITS: 100 INJECTION, SOLUTION INTRAVENOUS; SUBCUTANEOUS at 17:25

## 2025-04-26 RX ADMIN — INSULIN LISPRO 1 UNITS: 100 INJECTION, SOLUTION INTRAVENOUS; SUBCUTANEOUS at 21:11

## 2025-04-26 ASSESSMENT — PAIN SCALES - GENERAL: PAINLEVEL_OUTOF10: 0

## 2025-04-26 NOTE — PLAN OF CARE
Problem: Chronic Conditions and Co-morbidities  Goal: Patient's chronic conditions and co-morbidity symptoms are monitored and maintained or improved  4/13/2025 0203 by Eric Glover RN  Outcome: Progressing  Flowsheets (Taken 4/12/2025 1925)  Care Plan - Patient's Chronic Conditions and Co-Morbidity Symptoms are Monitored and Maintained or Improved: Monitor and assess patient's chronic conditions and comorbid symptoms for stability, deterioration, or improvement  4/12/2025 1444 by John Cantu RN  Outcome: Progressing     Problem: Discharge Planning  Goal: Discharge to home or other facility with appropriate resources  4/13/2025 0203 by Eric Glover RN  Outcome: Progressing  Flowsheets (Taken 4/12/2025 1925)  Discharge to home or other facility with appropriate resources: Identify barriers to discharge with patient and caregiver  4/12/2025 1444 by John Cantu RN  Outcome: Progressing     Problem: Safety - Adult  Goal: Free from fall injury  4/13/2025 0203 by Eric Glover RN  Outcome: Progressing  4/12/2025 1444 by John Cantu RN  Outcome: Progressing     Problem: Pain  Goal: Verbalizes/displays adequate comfort level or baseline comfort level  4/13/2025 0203 by Eric Glover RN  Outcome: Progressing  Flowsheets (Taken 4/12/2025 1925)  Verbalizes/displays adequate comfort level or baseline comfort level:   Encourage patient to monitor pain and request assistance   Assess pain using appropriate pain scale   Administer analgesics based on type and severity of pain and evaluate response  4/12/2025 1444 by John Cantu RN  Outcome: Progressing     Problem: Skin/Tissue Integrity  Goal: Skin integrity remains intact  Description: 1.  Monitor for areas of redness and/or skin breakdown2.  Assess vascular access sites hourly3.  Every 4-6 hours minimum:  Change oxygen saturation probe site4.  Every 4-6 hours:  If on nasal continuous positive airway pressure, respiratory therapy assess 
  Problem: Chronic Conditions and Co-morbidities  Goal: Patient's chronic conditions and co-morbidity symptoms are monitored and maintained or improved  4/13/2025 0955 by Michaela Go RN  Outcome: Progressing  Flowsheets (Taken 4/13/2025 0731)  Care Plan - Patient's Chronic Conditions and Co-Morbidity Symptoms are Monitored and Maintained or Improved: Monitor and assess patient's chronic conditions and comorbid symptoms for stability, deterioration, or improvement  4/13/2025 0203 by Eric Glover RN  Outcome: Progressing  Flowsheets (Taken 4/12/2025 1925)  Care Plan - Patient's Chronic Conditions and Co-Morbidity Symptoms are Monitored and Maintained or Improved: Monitor and assess patient's chronic conditions and comorbid symptoms for stability, deterioration, or improvement     Problem: Discharge Planning  Goal: Discharge to home or other facility with appropriate resources  Recent Flowsheet Documentation  Taken 4/13/2025 0731 by Michaela Go RN  Discharge to home or other facility with appropriate resources: Identify barriers to discharge with patient and caregiver  4/13/2025 0203 by Eric Glover RN  Outcome: Progressing  Flowsheets (Taken 4/12/2025 1925)  Discharge to home or other facility with appropriate resources: Identify barriers to discharge with patient and caregiver     Problem: Safety - Adult  Goal: Free from fall injury  4/13/2025 0203 by Eric Glover RN  Outcome: Progressing     Problem: Pain  Goal: Verbalizes/displays adequate comfort level or baseline comfort level  4/13/2025 0203 by Eric Glover RN  Outcome: Progressing  Flowsheets (Taken 4/12/2025 1925)  Verbalizes/displays adequate comfort level or baseline comfort level:   Encourage patient to monitor pain and request assistance   Assess pain using appropriate pain scale   Administer analgesics based on type and severity of pain and evaluate response     Problem: Skin/Tissue Integrity  Goal: Skin integrity 
  Problem: Chronic Conditions and Co-morbidities  Goal: Patient's chronic conditions and co-morbidity symptoms are monitored and maintained or improved  4/13/2025 2223 by Eric Glover, RN  Outcome: Progressing  Flowsheets (Taken 4/13/2025 1914)  Care Plan - Patient's Chronic Conditions and Co-Morbidity Symptoms are Monitored and Maintained or Improved: Monitor and assess patient's chronic conditions and comorbid symptoms for stability, deterioration, or improvement  4/13/2025 0955 by Michaela Go, RN  Outcome: Progressing  Flowsheets (Taken 4/13/2025 0731)  Care Plan - Patient's Chronic Conditions and Co-Morbidity Symptoms are Monitored and Maintained or Improved: Monitor and assess patient's chronic conditions and comorbid symptoms for stability, deterioration, or improvement     Problem: Discharge Planning  Goal: Discharge to home or other facility with appropriate resources  Outcome: Progressing  Flowsheets (Taken 4/13/2025 1914)  Discharge to home or other facility with appropriate resources: Identify barriers to discharge with patient and caregiver     Problem: Safety - Adult  Goal: Free from fall injury  Outcome: Progressing     Problem: Pain  Goal: Verbalizes/displays adequate comfort level or baseline comfort level  Outcome: Progressing  Flowsheets (Taken 4/13/2025 1914)  Verbalizes/displays adequate comfort level or baseline comfort level:   Encourage patient to monitor pain and request assistance   Assess pain using appropriate pain scale   Administer analgesics based on type and severity of pain and evaluate response     Problem: Skin/Tissue Integrity  Goal: Skin integrity remains intact  Description: 1.  Monitor for areas of redness and/or skin breakdown2.  Assess vascular access sites hourly3.  Every 4-6 hours minimum:  Change oxygen saturation probe site4.  Every 4-6 hours:  If on nasal continuous positive airway pressure, respiratory therapy assess nares and determine need for appliance 
  Problem: Chronic Conditions and Co-morbidities  Goal: Patient's chronic conditions and co-morbidity symptoms are monitored and maintained or improved  4/17/2025 0756 by Bjorn Kwan RN  Outcome: Progressing  4/17/2025 0755 by Bjorn Kwan RN  Outcome: Progressing  4/16/2025 2028 by Nay Allen RN  Outcome: Progressing  Flowsheets (Taken 4/16/2025 1930)  Care Plan - Patient's Chronic Conditions and Co-Morbidity Symptoms are Monitored and Maintained or Improved: Monitor and assess patient's chronic conditions and comorbid symptoms for stability, deterioration, or improvement     Problem: Discharge Planning  Goal: Discharge to home or other facility with appropriate resources  4/17/2025 0756 by Bjorn Kwan RN  Outcome: Progressing  4/17/2025 0755 by Bjorn Kwan RN  Outcome: Progressing  4/16/2025 2028 by Nay Allen RN  Outcome: Progressing  Flowsheets (Taken 4/16/2025 1930)  Discharge to home or other facility with appropriate resources: Identify barriers to discharge with patient and caregiver     Problem: Safety - Adult  Goal: Free from fall injury  4/17/2025 0756 by Bjorn Kwan RN  Outcome: Progressing  4/17/2025 0755 by Bjorn Kwan RN  Outcome: Progressing  4/16/2025 2028 by Nay Allen RN  Outcome: Progressing  Flowsheets (Taken 4/16/2025 2028)  Free From Fall Injury: Instruct family/caregiver on patient safety     Problem: Pain  Goal: Verbalizes/displays adequate comfort level or baseline comfort level  4/17/2025 0756 by Bjorn Kwan RN  Outcome: Progressing  4/17/2025 0755 by Bjorn Kwan RN  Outcome: Progressing  4/16/2025 2028 by Nay Allen RN  Outcome: Progressing     
  Problem: Chronic Conditions and Co-morbidities  Goal: Patient's chronic conditions and co-morbidity symptoms are monitored and maintained or improved  4/17/2025 2344 by Ofelia Lincoln RN  Outcome: Progressing  4/17/2025 1942 by Bjorn Kwan RN  Outcome: Progressing     Problem: Discharge Planning  Goal: Discharge to home or other facility with appropriate resources  Outcome: Progressing     Problem: Safety - Adult  Goal: Free from fall injury  Outcome: Progressing  Flowsheets (Taken 4/17/2025 2339)  Free From Fall Injury: Instruct family/caregiver on patient safety     Problem: Pain  Goal: Verbalizes/displays adequate comfort level or baseline comfort level  Outcome: Progressing     Problem: Skin/Tissue Integrity  Goal: Skin integrity remains intact  Description: 1.  Monitor for areas of redness and/or skin breakdown2.  Assess vascular access sites hourly3.  Every 4-6 hours minimum:  Change oxygen saturation probe site4.  Every 4-6 hours:  If on nasal continuous positive airway pressure, respiratory therapy assess nares and determine need for appliance change or resting period  Outcome: Progressing  Flowsheets  Taken 4/17/2025 2339  Skin Integrity Remains Intact: Monitor for areas of redness and/or skin breakdown  Taken 4/17/2025 2000  Skin Integrity Remains Intact: Monitor for areas of redness and/or skin breakdown     Problem: Cardiovascular - Adult  Goal: Maintains optimal cardiac output and hemodynamic stability  Outcome: Progressing     Problem: ABCDS Injury Assessment  Goal: Absence of physical injury  Outcome: Progressing  Flowsheets (Taken 4/17/2025 2339)  Absence of Physical Injury: Implement safety measures based on patient assessment     Problem: Chronic Conditions and Co-morbidities  Goal: Patient's chronic conditions and co-morbidity symptoms are monitored and maintained or improved  4/17/2025 2344 by Ofelia Lincoln RN  Outcome: Progressing  4/17/2025 1942 by Bjorn Kwan RN  Outcome: 
  Problem: Chronic Conditions and Co-morbidities  Goal: Patient's chronic conditions and co-morbidity symptoms are monitored and maintained or improved  4/19/2025 2134 by Ofelia Lincoln RN  Outcome: Progressing  4/19/2025 0742 by Neil Robles RN  Outcome: Progressing     Problem: Discharge Planning  Goal: Discharge to home or other facility with appropriate resources  4/19/2025 2134 by Ofelia Lincoln RN  Outcome: Progressing  4/19/2025 0742 by Neil Robles RN  Outcome: Progressing     Problem: Safety - Adult  Goal: Free from fall injury  4/19/2025 2134 by Ofelia Lincoln RN  Outcome: Progressing  Flowsheets (Taken 4/19/2025 2129)  Free From Fall Injury: Instruct family/caregiver on patient safety  4/19/2025 0742 by Neil Robles RN  Outcome: Progressing     Problem: Pain  Goal: Verbalizes/displays adequate comfort level or baseline comfort level  4/19/2025 2134 by Ofelia Lincoln RN  Outcome: Progressing  4/19/2025 0742 by Neil Robles RN  Outcome: Progressing     Problem: Skin/Tissue Integrity  Goal: Skin integrity remains intact  Description: 1.  Monitor for areas of redness and/or skin breakdown2.  Assess vascular access sites hourly3.  Every 4-6 hours minimum:  Change oxygen saturation probe site4.  Every 4-6 hours:  If on nasal continuous positive airway pressure, respiratory therapy assess nares and determine need for appliance change or resting period  4/19/2025 2134 by Ofelia Lincoln RN  Outcome: Progressing  Flowsheets  Taken 4/19/2025 2129  Skin Integrity Remains Intact: Monitor for areas of redness and/or skin breakdown  Taken 4/19/2025 2000  Skin Integrity Remains Intact: Monitor for areas of redness and/or skin breakdown  4/19/2025 0742 by Neil Robles RN  Outcome: Progressing     Problem: Cardiovascular - Adult  Goal: Maintains optimal cardiac output and hemodynamic stability  4/19/2025 2134 by Ofelia Lincoln RN  Outcome: Progressing  Flowsheets (Taken 4/19/2025 
  Problem: Chronic Conditions and Co-morbidities  Goal: Patient's chronic conditions and co-morbidity symptoms are monitored and maintained or improved  Outcome: Progressing     Problem: Discharge Planning  Goal: Discharge to home or other facility with appropriate resources  Outcome: Progressing     Problem: Safety - Adult  Goal: Free from fall injury  Outcome: Progressing     Problem: Pain  Goal: Verbalizes/displays adequate comfort level or baseline comfort level  Outcome: Progressing     Problem: Skin/Tissue Integrity  Goal: Skin integrity remains intact  Description: 1.  Monitor for areas of redness and/or skin breakdown2.  Assess vascular access sites hourly3.  Every 4-6 hours minimum:  Change oxygen saturation probe site4.  Every 4-6 hours:  If on nasal continuous positive airway pressure, respiratory therapy assess nares and determine need for appliance change or resting period  Outcome: Progressing     
  Problem: Chronic Conditions and Co-morbidities  Goal: Patient's chronic conditions and co-morbidity symptoms are monitored and maintained or improved  Outcome: Progressing     Problem: Discharge Planning  Goal: Discharge to home or other facility with appropriate resources  Outcome: Progressing     Problem: Safety - Adult  Goal: Free from fall injury  Outcome: Progressing     Problem: Pain  Goal: Verbalizes/displays adequate comfort level or baseline comfort level  Outcome: Progressing     Problem: Skin/Tissue Integrity  Goal: Skin integrity remains intact  Description: 1.  Monitor for areas of redness and/or skin breakdown2.  Assess vascular access sites hourly3.  Every 4-6 hours minimum:  Change oxygen saturation probe site4.  Every 4-6 hours:  If on nasal continuous positive airway pressure, respiratory therapy assess nares and determine need for appliance change or resting period  Outcome: Progressing     Problem: Cardiovascular - Adult  Goal: Maintains optimal cardiac output and hemodynamic stability  Outcome: Progressing     
  Problem: Chronic Conditions and Co-morbidities  Goal: Patient's chronic conditions and co-morbidity symptoms are monitored and maintained or improved  Outcome: Progressing     Problem: Discharge Planning  Goal: Discharge to home or other facility with appropriate resources  Outcome: Progressing     Problem: Safety - Adult  Goal: Free from fall injury  Outcome: Progressing  Flowsheets (Taken 4/19/2025 0015)  Free From Fall Injury: Instruct family/caregiver on patient safety     Problem: Pain  Goal: Verbalizes/displays adequate comfort level or baseline comfort level  Outcome: Progressing     Problem: Skin/Tissue Integrity  Goal: Skin integrity remains intact  Description: 1.  Monitor for areas of redness and/or skin breakdown2.  Assess vascular access sites hourly3.  Every 4-6 hours minimum:  Change oxygen saturation probe site4.  Every 4-6 hours:  If on nasal continuous positive airway pressure, respiratory therapy assess nares and determine need for appliance change or resting period  Outcome: Progressing  Flowsheets (Taken 4/19/2025 0015)  Skin Integrity Remains Intact: Monitor for areas of redness and/or skin breakdown     Problem: Cardiovascular - Adult  Goal: Maintains optimal cardiac output and hemodynamic stability  Outcome: Progressing  Flowsheets (Taken 4/18/2025 2000)  Maintains optimal cardiac output and hemodynamic stability: Monitor blood pressure and heart rate     Problem: ABCDS Injury Assessment  Goal: Absence of physical injury  Outcome: Progressing  Flowsheets (Taken 4/19/2025 0015)  Absence of Physical Injury: Implement safety measures based on patient assessment     
  Problem: Chronic Conditions and Co-morbidities  Goal: Patient's chronic conditions and co-morbidity symptoms are monitored and maintained or improved  Outcome: Progressing  Flowsheets (Taken 4/11/2025 2223)  Care Plan - Patient's Chronic Conditions and Co-Morbidity Symptoms are Monitored and Maintained or Improved:   Monitor and assess patient's chronic conditions and comorbid symptoms for stability, deterioration, or improvement   Collaborate with multidisciplinary team to address chronic and comorbid conditions and prevent exacerbation or deterioration   Update acute care plan with appropriate goals if chronic or comorbid symptoms are exacerbated and prevent overall improvement and discharge     
  Problem: Chronic Conditions and Co-morbidities  Goal: Patient's chronic conditions and co-morbidity symptoms are monitored and maintained or improved  Outcome: Progressing  Flowsheets (Taken 4/14/2025 0907 by Jazzy Gómez RN)  Care Plan - Patient's Chronic Conditions and Co-Morbidity Symptoms are Monitored and Maintained or Improved: Monitor and assess patient's chronic conditions and comorbid symptoms for stability, deterioration, or improvement     Problem: Discharge Planning  Goal: Discharge to home or other facility with appropriate resources  Outcome: Progressing  Flowsheets (Taken 4/14/2025 0907 by Jazzy Gómez RN)  Discharge to home or other facility with appropriate resources: Identify barriers to discharge with patient and caregiver     Problem: Safety - Adult  Goal: Free from fall injury  Outcome: Progressing     Problem: Pain  Goal: Verbalizes/displays adequate comfort level or baseline comfort level  Outcome: Progressing     Problem: Skin/Tissue Integrity  Goal: Skin integrity remains intact  Description: 1.  Monitor for areas of redness and/or skin breakdown2.  Assess vascular access sites hourly3.  Every 4-6 hours minimum:  Change oxygen saturation probe site4.  Every 4-6 hours:  If on nasal continuous positive airway pressure, respiratory therapy assess nares and determine need for appliance change or resting period  Outcome: Progressing  Flowsheets (Taken 4/14/2025 0907 by Jazzy Gómze RN)  Skin Integrity Remains Intact: Monitor for areas of redness and/or skin breakdown     Problem: Cardiovascular - Adult  Goal: Maintains optimal cardiac output and hemodynamic stability  Outcome: Progressing  Flowsheets (Taken 4/14/2025 0907 by Jazzy Gómez RN)  Maintains optimal cardiac output and hemodynamic stability: Monitor blood pressure and heart rate     
  Problem: Chronic Conditions and Co-morbidities  Goal: Patient's chronic conditions and co-morbidity symptoms are monitored and maintained or improved  Outcome: Progressing  Flowsheets (Taken 4/24/2025 1048)  Care Plan - Patient's Chronic Conditions and Co-Morbidity Symptoms are Monitored and Maintained or Improved: Monitor and assess patient's chronic conditions and comorbid symptoms for stability, deterioration, or improvement     Problem: Discharge Planning  Goal: Discharge to home or other facility with appropriate resources  Outcome: Progressing  Flowsheets (Taken 4/24/2025 1048)  Discharge to home or other facility with appropriate resources:   Identify barriers to discharge with patient and caregiver   Arrange for needed discharge resources and transportation as appropriate     Problem: Safety - Adult  Goal: Free from fall injury  Outcome: Progressing  Flowsheets (Taken 4/24/2025 1048)  Free From Fall Injury: Instruct family/caregiver on patient safety     Problem: Pain  Goal: Verbalizes/displays adequate comfort level or baseline comfort level  Outcome: Progressing  Flowsheets (Taken 4/24/2025 1048)  Verbalizes/displays adequate comfort level or baseline comfort level:   Encourage patient to monitor pain and request assistance   Assess pain using appropriate pain scale     Problem: Skin/Tissue Integrity  Goal: Skin integrity remains intact  Description: 1.  Monitor for areas of redness and/or skin breakdown2.  Assess vascular access sites hourly3.  Every 4-6 hours minimum:  Change oxygen saturation probe site4.  Every 4-6 hours:  If on nasal continuous positive airway pressure, respiratory therapy assess nares and determine need for appliance change or resting period  Outcome: Progressing  Flowsheets (Taken 4/24/2025 1048)  Skin Integrity Remains Intact: Monitor for areas of redness and/or skin breakdown     Problem: Cardiovascular - Adult  Goal: Maintains optimal cardiac output and hemodynamic 
  Problem: Occupational Therapy - Adult  Goal: By Discharge: Performs self-care activities at highest level of function for planned discharge setting.  See evaluation for individualized goals.  Description: FUNCTIONAL STATUS PRIOR TO ADMISSION:  independent with all ADLS and IADLS, works as a   Receives Help From: Family, Prior Level of Assist for ADLs: Independent,  ,  ,  ,  ,  , Prior Level of Assist for Homemaking: Independent, Ambulation Assistance: Independent, Prior Level of Assist for Transfers: Independent, Active : No     HOME SUPPORT: Patient lived with mother whom can assist as needed.    Occupational Therapy Goals:  Initiated 4/22/2025  1.  Patient will perform grooming with Modified [place] within 7 day(s).  2.  Patient will perform upper body dressing with Modified [place] within 7 day(s).  3.  Patient will perform lower body dressing with Modified [place] within 7 day(s).  4.  Patient will perform toilet transfers with Modified [place]  within 7 day(s).  5.  Patient will perform all aspects of toileting with Modified [place] within 7 day(s).  6.  Patient will be independent with cardiac 6 pack exercises within 7 day(s).    7.  Patient will perform bathing with Modified [place] within 7 day(s).  8.  Patient will adhere to move in the tube precautions during functional tasks within 7 days.     Outcome: Progressing   OCCUPATIONAL THERAPY TREATMENT  Patient: Michael Fournier (45 y.o. male)  Date: 4/25/2025  Primary Diagnosis: Unstable angina (HCC) [I20.0]  Procedure(s) (LRB):  ON-PUMP CORONARY ARTERY BYPASS GRAFTING TIMES TWO WITH LEFT INTERNAL MAMMARY ARTERY, ENDOSCOPIC HARVESTING OF THE RIGHT GREATER SAPHENOUS VEIN, AND LEFT ATRIAL APPENDAGE LIGATION (E.R.A.S.). LATOYA BY DR. HAUSER. (N/A) 4 Days Post-Op   Precautions: Fall Risk (move in the tube)           Sternal Precautions: 5# Lifting Restrictions    Chart, occupational therapy assessment, plan of care, and goals 
  Problem: Occupational Therapy - Adult  Goal: By Discharge: Performs self-care activities at highest level of function for planned discharge setting.  See evaluation for individualized goals.  Description: FUNCTIONAL STATUS PRIOR TO ADMISSION:  independent with all ADLS and IADLS, works as a   Receives Help From: Family, Prior Level of Assist for ADLs: Independent,  ,  ,  ,  ,  , Prior Level of Assist for Homemaking: Independent, Ambulation Assistance: Independent, Prior Level of Assist for Transfers: Independent, Active : No     HOME SUPPORT: Patient lived with mother whom can assist as needed.    Occupational Therapy Goals:  Initiated 4/22/2025  1.  Patient will perform grooming with Modified [place] within 7 day(s).  2.  Patient will perform upper body dressing with Modified [place] within 7 day(s).  3.  Patient will perform lower body dressing with Modified [place] within 7 day(s).  4.  Patient will perform toilet transfers with Modified [place]  within 7 day(s).  5.  Patient will perform all aspects of toileting with Modified [place] within 7 day(s).  6.  Patient will be independent with cardiac 6 pack exercises within 7 day(s).    7.  Patient will perform bathing with Modified [place] within 7 day(s).  8.  Patient will adhere to move in the tube precautions during functional tasks within 7 days.     Outcome: Progressing   OCCUPATIONAL THERAPY TREATMENT  Patient: Michael Fournier (45 y.o. male)  Date: 4/23/2025  Primary Diagnosis: Unstable angina (HCC) [I20.0]  Procedure(s) (LRB):  ON-PUMP CORONARY ARTERY BYPASS GRAFTING TIMES TWO WITH LEFT INTERNAL MAMMARY ARTERY, ENDOSCOPIC HARVESTING OF THE RIGHT GREATER SAPHENOUS VEIN, AND LEFT ATRIAL APPENDAGE LIGATION (E.R.A.S.). LATOYA BY DR. HAUSER. (N/A) 2 Days Post-Op   Precautions: Fall Risk (move in the tube)           Sternal Precautions: 5# Lifting Restrictions    Chart, occupational therapy assessment, plan of care, and goals 
  Problem: Physical Therapy - Adult  Goal: By Discharge: Performs mobility at highest level of function for planned discharge setting.  See evaluation for individualized goals.  Description: FUNCTIONAL STATUS PRIOR TO ADMISSION: Patient was independent and active without use of DME.    HOME SUPPORT PRIOR TO ADMISSION: The patient lived with mom but did not require assistance.    Physical Therapy Goals  Initiated 4/22/2025  1.  Patient will move from supine to sit and sit to supine, scoot up and down, and roll side to side in bed with modified independence within 5 day(s).    2.  Patient will perform sit to stand with modified independence within 5 day(s).  3.  Patient will transfer from bed to chair and chair to bed with modified independence using the least restrictive device within 5 day(s).  4.  Patient will ambulate with modified independence for 200 feet with the least restrictive device within 5 day(s).   5.  Patient will ascend/descend 3 stairs with bilateral handrail(s) with modified independence within 5 day(s).  6.  Patient will perform cardiac exercises per protocol with independence within 5 days.  7.  Patient will verbally recall and functionally demonstrate mindful-based movements (\"move in the tube\") principles without cues within 5 days.    4/24/2025 1051 by Rebecca Cyr, PT  Outcome: Progressing     PHYSICAL THERAPY TREATMENT    Patient: Michael Fournier (45 y.o. male)  Date: 4/24/2025  Diagnosis: Unstable angina (HCC) [I20.0] Unstable angina (HCC)  Procedure(s) (LRB):  ON-PUMP CORONARY ARTERY BYPASS GRAFTING TIMES TWO WITH LEFT INTERNAL MAMMARY ARTERY, ENDOSCOPIC HARVESTING OF THE RIGHT GREATER SAPHENOUS VEIN, AND LEFT ATRIAL APPENDAGE LIGATION (E.R.A.S.). LATOYA BY DR. HAUSER. (N/A) 3 Days Post-Op  Precautions: Restrictions/Precautions  Restrictions/Precautions: Fall Risk (move in the tube)     Position Activity Restriction  Sternal Precautions: 5# Lifting Restrictions      ASSESSMENT:  Patient 
  Problem: Physical Therapy - Adult  Goal: By Discharge: Performs mobility at highest level of function for planned discharge setting.  See evaluation for individualized goals.  Description: FUNCTIONAL STATUS PRIOR TO ADMISSION: Patient was independent and active without use of DME.    HOME SUPPORT PRIOR TO ADMISSION: The patient lived with mom but did not require assistance.    Physical Therapy Goals  Initiated 4/22/2025  1.  Patient will move from supine to sit and sit to supine, scoot up and down, and roll side to side in bed with modified independence within 5 day(s).    2.  Patient will perform sit to stand with modified independence within 5 day(s).  3.  Patient will transfer from bed to chair and chair to bed with modified independence using the least restrictive device within 5 day(s).  4.  Patient will ambulate with modified independence for 200 feet with the least restrictive device within 5 day(s).   5.  Patient will ascend/descend 3 stairs with bilateral handrail(s) with modified independence within 5 day(s).  6.  Patient will perform cardiac exercises per protocol with independence within 5 days.  7.  Patient will verbally recall and functionally demonstrate mindful-based movements (\"move in the tube\") principles without cues within 5 days.    Outcome: Adequate for Discharge     PHYSICAL THERAPY TREATMENT/DISCHARGE    Patient: Michael Fournier (45 y.o. male)  Date: 4/25/2025  Diagnosis: Unstable angina (HCC) [I20.0] Unstable angina (HCC)  Procedure(s) (LRB):  ON-PUMP CORONARY ARTERY BYPASS GRAFTING TIMES TWO WITH LEFT INTERNAL MAMMARY ARTERY, ENDOSCOPIC HARVESTING OF THE RIGHT GREATER SAPHENOUS VEIN, AND LEFT ATRIAL APPENDAGE LIGATION (E.R.A.S.). LATOYA BY DR. HAUSER. (N/A) 4 Days Post-Op  Precautions: Restrictions/Precautions  Restrictions/Precautions: Fall Risk (move in the tube)     Position Activity Restriction  Sternal Precautions: 5# Lifting Restrictions      ASSESSMENT:  Patient has been followed by 
  Problem: Physical Therapy - Adult  Goal: By Discharge: Performs mobility at highest level of function for planned discharge setting.  See evaluation for individualized goals.  Description: FUNCTIONAL STATUS PRIOR TO ADMISSION: Patient was independent and active without use of DME.    HOME SUPPORT PRIOR TO ADMISSION: The patient lived with mom but did not require assistance.    Physical Therapy Goals  Initiated 4/22/2025  1.  Patient will move from supine to sit and sit to supine, scoot up and down, and roll side to side in bed with modified independence within 5 day(s).    2.  Patient will perform sit to stand with modified independence within 5 day(s).  3.  Patient will transfer from bed to chair and chair to bed with modified independence using the least restrictive device within 5 day(s).  4.  Patient will ambulate with modified independence for 200 feet with the least restrictive device within 5 day(s).   5.  Patient will ascend/descend 3 stairs with bilateral handrail(s) with modified independence within 5 day(s).  6.  Patient will perform cardiac exercises per protocol with independence within 5 days.  7.  Patient will verbally recall and functionally demonstrate mindful-based movements (\"move in the tube\") principles without cues within 5 days.    Outcome: Progressing     PHYSICAL THERAPY TREATMENT    Patient: Michael Fournier (45 y.o. male)  Date: 4/23/2025  Diagnosis: Unstable angina (HCC) [I20.0] Unstable angina (HCC)  Procedure(s) (LRB):  ON-PUMP CORONARY ARTERY BYPASS GRAFTING TIMES TWO WITH LEFT INTERNAL MAMMARY ARTERY, ENDOSCOPIC HARVESTING OF THE RIGHT GREATER SAPHENOUS VEIN, AND LEFT ATRIAL APPENDAGE LIGATION (E.R.A.S.). LATOYA BY DR. HAUSER. (N/A) 2 Days Post-Op  Precautions: Restrictions/Precautions  Restrictions/Precautions: Fall Risk (move in the tube)     Position Activity Restriction  Sternal Precautions: 5# Lifting Restrictions      ASSESSMENT:  Patient continues to benefit from skilled PT 
End of Shift Note    Bedside shift change report given to NEHA Antony (oncoming nurse) by Tasha Barraza RN (offgoing nurse).  Report included the following information SBAR, Procedure Summary, and Pre Procedure Checklist    Shift worked: 9963-3967     Shift summary and any significant changes:    1900 Heparin gtt infusing at 9 units/kg/hr   2355 Heparin gtt increased to 11 units/kg/hr, RN verified with [place], pharmD  0635 P2Y12 test sent   Pt has not had MRI   Concerns for physician to address:  None     Zone phone for oncoming shift:   None       Activity:  Level of Assistance: Independent  Number times ambulated in hallways past shift: 0  Number of times OOB to chair past shift: 1    Cardiac:   Cardiac Monitoring: Yes      Cardiac Rhythm: Sinus rhythm    Access:  Current line(s): PIV     Genitourinary:   Urinary Status: Voiding    Respiratory:   O2 Device: None (Room air)  Chronic home O2 use?: NO  Incentive spirometer at bedside: YES    GI:  Last BM (including prior to admit): 04/13/25  Current diet:  ADULT DIET; Regular; Low Fat/Low Chol/High Fiber/2 gm Na  ADULT ORAL NUTRITION SUPPLEMENT; Breakfast, Lunch, Dinner; Low Calorie/High Protein Oral Supplement  Passing flatus: YES    Pain Management:   Patient states pain is manageable on current regimen: YES    Skin:  Oren Scale Score: 21  Interventions: Wound Offloading (Prevention Methods): Repositioning    Patient Safety:  Fall Risk: Nursing Judgement-Fall Risk High(Add Comments): No  Fall Risk Interventions  Nursing Judgement-Fall Risk High(Add Comments): No  Toilet Every 2 Hours-In Advance of Need: Yes  Hourly Visual Checks: Awake, In bed  Fall Visual Posted: Socks  Room Door Open: Yes  Alarm On: Bed  Patient Moved Closer to Nursing Station: No    Active Consults:   IP CONSULT TO CARDIOLOGY  IP CONSULT TO CARDIAC REHAB  IP CONSULT TO DIABETES MANAGEMENT  IP CONSULT TO GENERAL SURGERY    Length of Stay:  Expected LOS: 5  Actual LOS: 3    Tasha Barraza 
Left message for patient to return call to clinic.    Please offer appt 7/30/19 at 10:10 am at the [facility]. This is the soonest appointment that we have.  
Problem: Occupational Therapy - Adult  Goal: By Discharge: Performs self-care activities at highest level of function for planned discharge setting.  See evaluation for individualized goals.  Description: FUNCTIONAL STATUS PRIOR TO ADMISSION:  independent with all ADLS and IADLS, works as a   Receives Help From: Family, Prior Level of Assist for ADLs: Independent,  ,  ,  ,  ,  , Prior Level of Assist for Homemaking: Independent, Ambulation Assistance: Independent, Prior Level of Assist for Transfers: Independent, Active : No     HOME SUPPORT: Patient lived with mother whom can assist as needed.    Occupational Therapy Goals:  Initiated 4/22/2025  1.  Patient will perform grooming with Modified Bee within 7 day(s).  2.  Patient will perform upper body dressing with Modified Bee within 7 day(s).  3.  Patient will perform lower body dressing with Modified Bee within 7 day(s).  4.  Patient will perform toilet transfers with Modified Bee  within 7 day(s).  5.  Patient will perform all aspects of toileting with Modified Bee within 7 day(s).  6.  Patient will be independent with cardiac 6 pack exercises within 7 day(s).    7.  Patient will perform bathing with Modified Bee within 7 day(s).  8.  Patient will adhere to move in the tube precautions during functional tasks within 7 days.     Outcome: Progressing   OCCUPATIONAL THERAPY EVALUATION    Patient: Michael Fournier (45 y.o. male)  Date: 4/22/2025  Primary Diagnosis: Unstable angina (HCC) [I20.0]  Procedure(s) (LRB):  ON-PUMP CORONARY ARTERY BYPASS GRAFTING TIMES TWO WITH LEFT INTERNAL MAMMARY ARTERY, ENDOSCOPIC HARVESTING OF THE RIGHT GREATER SAPHENOUS VEIN, AND LEFT ATRIAL APPENDAGE LIGATION (E.R.A.S.). LATOYA BY DR. HAUSER. (N/A) 1 Day Post-Op     Precautions: Fall Risk (move in the tube)           Sternal Precautions: 5# Lifting Restrictions      ASSESSMENT :  The patient is limited by decreased 
Pt VSS and maintaining NSR without amio gtt  
Interventions  Nursing Judgement-Fall Risk High(Add Comments): No  Toilet Every 2 Hours-In Advance of Need: Yes  Hourly Visual Checks: Awake, In bed  Fall Visual Posted: Socks, Fall sign posted  Room Door Open: Yes  Alarm On: Bed  Patient Moved Closer to Nursing Station: Yes    Active Consults:   IP CONSULT TO CARDIOLOGY  IP CONSULT TO CARDIAC REHAB  IP CONSULT TO DIABETES MANAGEMENT  IP CONSULT TO INTERVENTIONAL RADIOLOGY  IP CONSULT TO ONCOLOGY    Length of Stay:  Expected LOS: 13  Actual LOS: 9    PARAG HERNANDEZ RN                              Problem: Chronic Conditions and Co-morbidities  Goal: Patient's chronic conditions and co-morbidity symptoms are monitored and maintained or improved  Outcome: Progressing  Flowsheets  Taken 4/20/2025 1915 by Parag Hernandez RN  Care Plan - Patient's Chronic Conditions and Co-Morbidity Symptoms are Monitored and Maintained or Improved: Collaborate with multidisciplinary team to address chronic and comorbid conditions and prevent exacerbation or deterioration  Taken 4/20/2025 0800 by Christina Gonzalez RN  Care Plan - Patient's Chronic Conditions and Co-Morbidity Symptoms are Monitored and Maintained or Improved: Collaborate with multidisciplinary team to address chronic and comorbid conditions and prevent exacerbation or deterioration     Problem: Discharge Planning  Goal: Discharge to home or other facility with appropriate resources  Outcome: Progressing  Flowsheets  Taken 4/20/2025 1915 by Parag Hernandez RN  Discharge to home or other facility with appropriate resources: Identify barriers to discharge with patient and caregiver  Taken 4/20/2025 0800 by Christina Gonzalez RN  Discharge to home or other facility with appropriate resources: Identify barriers to discharge with patient and caregiver     Problem: Safety - Adult  Goal: Free from fall injury  Outcome: Progressing     Problem: Pain  Goal: Verbalizes/displays adequate comfort level or 
RN  Outcome: Progressing  Flowsheets  Taken 4/15/2025 2149 by Nay Allen RN  Skin Integrity Remains Intact: Monitor for areas of redness and/or skin breakdown  Taken 4/15/2025 2046 by Nay Allen RN  Skin Integrity Remains Intact: Monitor for areas of redness and/or skin breakdown  Taken 4/15/2025 1540 by Keith Garcia RN  Skin Integrity Remains Intact: Monitor for areas of redness and/or skin breakdown     Problem: Cardiovascular - Adult  Goal: Maintains optimal cardiac output and hemodynamic stability  4/16/2025 0854 by Arpan Whitney RN  Outcome: Progressing  4/15/2025 2150 by Nay Allen RN  Outcome: Progressing  Flowsheets (Taken 4/15/2025 2046)  Maintains optimal cardiac output and hemodynamic stability: Monitor blood pressure and heart rate     Problem: ABCDS Injury Assessment  Goal: Absence of physical injury  4/16/2025 0854 by Arpan Whitney RN  Outcome: Progressing  4/15/2025 2150 by Nay Allen RN  Outcome: Progressing     
Good;Occasional      ADL Intervention:      Grooming: Stand by assistance   Grooming Skilled Clinical Factors: standing at sink      LE Dressing: Stand by assistance  LE Dressing Skilled Clinical Factors: socks and underpants    Toileting: Stand by assistance       Functional Mobility: Stand by assistance  Functional Mobility Skilled Clinical Factors: no device     Product Used : Bath wipes;Protective barrier    Pain Rating:  No c/o pain    Activity Tolerance:   Fair  and tachycardia  Please refer to the flowsheet for vital signs taken during this treatment.    After treatment:   Patient left in no apparent distress sitting up in chair, Call bell within reach, and Updated patient's board on functional status and mobility recommendations    COMMUNICATION/EDUCATION:   The patient's plan of care was discussed with: physical therapist    Thank you for this referral.  Delmi Reddy OT  Minutes: 16   
RN  Skin Integrity Remains Intact: Monitor for areas of redness and/or skin breakdown  Taken 4/15/2025 1540 by Keith Garcia RN  Skin Integrity Remains Intact: Monitor for areas of redness and/or skin breakdown  4/15/2025 1446 by Arpan Whitney, RN  Outcome: Progressing     Problem: Cardiovascular - Adult  Goal: Maintains optimal cardiac output and hemodynamic stability  4/15/2025 2150 by Nay Allen, RN  Outcome: Progressing  Flowsheets (Taken 4/15/2025 2046)  Maintains optimal cardiac output and hemodynamic stability: Monitor blood pressure and heart rate  4/15/2025 1446 by Arpan Whitney, RN  Outcome: Progressing     Problem: ABCDS Injury Assessment  Goal: Absence of physical injury  Outcome: Progressing     
caregiver  4/16/2025 0854 by Arpan Whitney RN  Outcome: Progressing     Problem: Safety - Adult  Goal: Free from fall injury  4/16/2025 2028 by Nay Allen RN  Outcome: Progressing  Flowsheets (Taken 4/16/2025 2028)  Free From Fall Injury: Instruct family/caregiver on patient safety  4/16/2025 0854 by Arpan Whitney RN  Outcome: Progressing     Problem: Pain  Goal: Verbalizes/displays adequate comfort level or baseline comfort level  4/16/2025 2028 by Nay Allen RN  Outcome: Progressing  4/16/2025 0854 by Arpan Whitney RN  Outcome: Progressing     Problem: Skin/Tissue Integrity  Goal: Skin integrity remains intact  Description: 1.  Monitor for areas of redness and/or skin breakdown2.  Assess vascular access sites hourly3.  Every 4-6 hours minimum:  Change oxygen saturation probe site4.  Every 4-6 hours:  If on nasal continuous positive airway pressure, respiratory therapy assess nares and determine need for appliance change or resting period  4/16/2025 2028 by Nay Allen RN  Outcome: Progressing  Flowsheets  Taken 4/16/2025 2028  Skin Integrity Remains Intact: Monitor for areas of redness and/or skin breakdown  Taken 4/16/2025 1930  Skin Integrity Remains Intact: Monitor for areas of redness and/or skin breakdown  4/16/2025 0854 by Arpan Whitney RN  Outcome: Progressing     Problem: Cardiovascular - Adult  Goal: Maintains optimal cardiac output and hemodynamic stability  4/16/2025 2028 by Nay Allen RN  Outcome: Progressing  Flowsheets (Taken 4/16/2025 1930)  Maintains optimal cardiac output and hemodynamic stability: Monitor blood pressure and heart rate  4/16/2025 0854 by Arpan Whitney RN  Outcome: Progressing     Problem: ABCDS Injury Assessment  Goal: Absence of physical injury  4/16/2025 2028 by Nay Allen RN  Outcome: Progressing  Flowsheets (Taken 4/16/2025 2028)  Absence of Physical Injury: Implement safety measures based on patient 
Hilton DE LA GARZA, Tom SMITH, Nicolle S, Maria Del Carmen K, Marilu KAUFMAN. Activity Measure for Post-Acute Care \"6-Clicks\" Basic Mobility Scores Predict Discharge Destination After Acute Care Hospitalization in Select Patient Groups: A Retrospective, Observational Study. Arch Rehabil Res Clin Transl. 2022;4(3):076218. doi: 10.1016/j.arrct..262945. PMID: 50915414; PMCID: AKT8966952.  4. Rebekah MARTIN, Dana KAUFMAN, Aldo W, Pooja BEASLEY. AM-PAC Short Forms Manual 4.0. Revised 2020.                                                                                                                                                                                                                              Pain Ratin/10   Pain Intervention(s):   nursing notified and addressing, repositioning, and pain is at a level acceptable to the patient    Activity Tolerance:   Good, requires rest breaks, and SpO2 stable on room air    After treatment:   Patient left in no apparent distress sitting up in chair, Call bell within reach, and Updated patient's board on functional status and mobility recommendations    COMMUNICATION/EDUCATION:   The patient's plan of care was discussed with: occupational therapist and registered nurse    Patient Education  Education Given To: Patient  Education Provided: Role of Therapy;Plan of Care;Home Exercise Program;Precautions;Transfer Training;Mobility Training;Fall Prevention Strategies  Education Provided Comments: \"Move in the tube\" precautions; cardiac exercises  Education Method: Verbal;Demonstration  Barriers to Learning: None  Education Outcome: Verbalized understanding;Demonstrated understanding;Continued education needed    Thank you for this referral.  SHAGGY DEL CID, PT  Minutes: 35      Physical Therapy Evaluation Charge Determination   History Examination Presentation Decision-Making   MEDIUM  Complexity : 1-2 comorbidities / personal factors will impact the outcome/ POC  MEDIUM Complexity : 3 Standardized

## 2025-04-27 ENCOUNTER — APPOINTMENT (OUTPATIENT)
Facility: HOSPITAL | Age: 45
DRG: 165 | End: 2025-04-27
Attending: INTERNAL MEDICINE
Payer: COMMERCIAL

## 2025-04-27 VITALS
WEIGHT: 245.1 LBS | OXYGEN SATURATION: 100 % | HEIGHT: 75 IN | DIASTOLIC BLOOD PRESSURE: 70 MMHG | BODY MASS INDEX: 30.48 KG/M2 | TEMPERATURE: 98.7 F | HEART RATE: 92 BPM | RESPIRATION RATE: 16 BRPM | SYSTOLIC BLOOD PRESSURE: 128 MMHG

## 2025-04-27 PROBLEM — Z98.890 S/P LEFT ATRIAL APPENDAGE LIGATION: Status: RESOLVED | Noted: 2025-04-21 | Resolved: 2025-04-27

## 2025-04-27 PROBLEM — Z79.4 TYPE 2 DIABETES MELLITUS WITH HYPERGLYCEMIA, WITH LONG-TERM CURRENT USE OF INSULIN (HCC): Status: RESOLVED | Noted: 2025-04-17 | Resolved: 2025-04-27

## 2025-04-27 PROBLEM — E11.65 TYPE 2 DIABETES MELLITUS WITH HYPERGLYCEMIA, WITH LONG-TERM CURRENT USE OF INSULIN (HCC): Status: RESOLVED | Noted: 2025-04-17 | Resolved: 2025-04-27

## 2025-04-27 PROBLEM — I65.23 BILATERAL CAROTID ARTERY STENOSIS: Status: RESOLVED | Noted: 2025-04-14 | Resolved: 2025-04-27

## 2025-04-27 PROBLEM — I24.9 ACUTE CORONARY SYNDROME (HCC): Status: RESOLVED | Noted: 2023-06-17 | Resolved: 2025-04-27

## 2025-04-27 PROBLEM — R73.9 STRESS HYPERGLYCEMIA: Status: RESOLVED | Noted: 2025-04-23 | Resolved: 2025-04-27

## 2025-04-27 PROBLEM — I20.0 UNSTABLE ANGINA (HCC): Status: RESOLVED | Noted: 2025-04-11 | Resolved: 2025-04-27

## 2025-04-27 PROBLEM — Z01.810 PREOP CARDIOVASCULAR EXAM: Status: RESOLVED | Noted: 2025-04-14 | Resolved: 2025-04-27

## 2025-04-27 PROBLEM — R73.09 HEMOGLOBIN A1C GREATER THAN 9%, INDICATING POOR DIABETIC CONTROL: Status: RESOLVED | Noted: 2025-04-23 | Resolved: 2025-04-27

## 2025-04-27 PROBLEM — Z95.1 S/P CABG X 2: Status: RESOLVED | Noted: 2025-04-21 | Resolved: 2025-04-27

## 2025-04-27 LAB
ANION GAP SERPL CALC-SCNC: 8 MMOL/L (ref 2–12)
BUN SERPL-MCNC: 12 MG/DL (ref 6–20)
BUN/CREAT SERPL: 18 (ref 12–20)
CALCIUM SERPL-MCNC: 8.4 MG/DL (ref 8.5–10.1)
CHLORIDE SERPL-SCNC: 103 MMOL/L (ref 97–108)
CO2 SERPL-SCNC: 24 MMOL/L (ref 21–32)
CREAT SERPL-MCNC: 0.68 MG/DL (ref 0.7–1.3)
ERYTHROCYTE [DISTWIDTH] IN BLOOD BY AUTOMATED COUNT: 15.8 % (ref 11.5–14.5)
GLUCOSE SERPL-MCNC: 158 MG/DL (ref 65–100)
HCT VFR BLD AUTO: 29.2 % (ref 36.6–50.3)
HGB BLD-MCNC: 9.7 G/DL (ref 12.1–17)
MAGNESIUM SERPL-MCNC: 2.2 MG/DL (ref 1.6–2.4)
MCH RBC QN AUTO: 29.7 PG (ref 26–34)
MCHC RBC AUTO-ENTMCNC: 33.2 G/DL (ref 30–36.5)
MCV RBC AUTO: 89.3 FL (ref 80–99)
NRBC # BLD: 0 K/UL (ref 0–0.01)
NRBC BLD-RTO: 0 PER 100 WBC
PLATELET # BLD AUTO: 319 K/UL (ref 150–400)
PMV BLD AUTO: 9.9 FL (ref 8.9–12.9)
POTASSIUM SERPL-SCNC: 3.7 MMOL/L (ref 3.5–5.1)
RBC # BLD AUTO: 3.27 M/UL (ref 4.1–5.7)
SODIUM SERPL-SCNC: 135 MMOL/L (ref 136–145)
WBC # BLD AUTO: 16.8 K/UL (ref 4.1–11.1)

## 2025-04-27 PROCEDURE — 80048 BASIC METABOLIC PNL TOTAL CA: CPT

## 2025-04-27 PROCEDURE — 83735 ASSAY OF MAGNESIUM: CPT

## 2025-04-27 PROCEDURE — 6370000000 HC RX 637 (ALT 250 FOR IP): Performed by: CLINICAL NURSE SPECIALIST

## 2025-04-27 PROCEDURE — 71045 X-RAY EXAM CHEST 1 VIEW: CPT

## 2025-04-27 PROCEDURE — 6370000000 HC RX 637 (ALT 250 FOR IP): Performed by: PHYSICIAN ASSISTANT

## 2025-04-27 PROCEDURE — 6370000000 HC RX 637 (ALT 250 FOR IP)

## 2025-04-27 PROCEDURE — 36415 COLL VENOUS BLD VENIPUNCTURE: CPT

## 2025-04-27 PROCEDURE — 6370000000 HC RX 637 (ALT 250 FOR IP): Performed by: NURSE PRACTITIONER

## 2025-04-27 PROCEDURE — 6360000002 HC RX W HCPCS: Performed by: INTERNAL MEDICINE

## 2025-04-27 PROCEDURE — 85027 COMPLETE CBC AUTOMATED: CPT

## 2025-04-27 PROCEDURE — 94640 AIRWAY INHALATION TREATMENT: CPT

## 2025-04-27 RX ORDER — COLCHICINE 0.6 MG/1
0.6 TABLET ORAL DAILY
Qty: 30 TABLET | Refills: 3 | Status: SHIPPED | OUTPATIENT
Start: 2025-04-28

## 2025-04-27 RX ADMIN — GABAPENTIN 200 MG: 100 CAPSULE ORAL at 08:19

## 2025-04-27 RX ADMIN — METHOCARBAMOL TABLETS 750 MG: 500 TABLET, COATED ORAL at 08:19

## 2025-04-27 RX ADMIN — Medication 400 MG: at 08:20

## 2025-04-27 RX ADMIN — INSULIN GLARGINE 40 UNITS: 100 INJECTION, SOLUTION SUBCUTANEOUS at 08:20

## 2025-04-27 RX ADMIN — ASPIRIN 81 MG: 81 TABLET, COATED ORAL at 08:20

## 2025-04-27 RX ADMIN — AMIODARONE HYDROCHLORIDE 400 MG: 200 TABLET ORAL at 08:20

## 2025-04-27 RX ADMIN — TAMSULOSIN HYDROCHLORIDE 0.4 MG: 0.4 CAPSULE ORAL at 08:19

## 2025-04-27 RX ADMIN — AMLODIPINE BESYLATE 5 MG: 5 TABLET ORAL at 08:20

## 2025-04-27 RX ADMIN — METOPROLOL TARTRATE 50 MG: 50 TABLET, FILM COATED ORAL at 08:20

## 2025-04-27 RX ADMIN — COLCHICINE 0.6 MG: 0.6 TABLET, FILM COATED ORAL at 08:20

## 2025-04-27 RX ADMIN — ARFORMOTEROL TARTRATE: 15 SOLUTION RESPIRATORY (INHALATION) at 07:49

## 2025-04-27 ASSESSMENT — PAIN SCALES - GENERAL
PAINLEVEL_OUTOF10: 0
PAINLEVEL_OUTOF10: 0

## 2025-04-27 NOTE — CARE COORDINATION
Pt clear from CM standpoint.    Transition of Care Plan:    RUR: 10% \"low risk\"  Prior Level of Functioning: Independent with ADL's  Disposition: Home with family support and [facility]  MIKIE: 4/27  If SNF or IPR: Date FOC offered: N/A  Follow up appointments: PCP/Specialists as indicated  DME needed: RW - [facility]     Transportation at discharge: Family to transport   IM/IMM Medicare/ letter given: N/A  Is patient a  and connected with VA? No  Caregiver Contact: Annika De La Fuente (mother), [phone]  Discharge Caregiver contacted prior to discharge? To be contacted  Care Conference needed? Not at this time   Barriers to discharge: None     0846 AM: CM contacted [facility] main line to inquire about status of RW order. CM spoke with Paul who noted RW has been approved, Paul updated approval in [facility]. CM to provide pt RW at bedside. CM updated it HH with d/c plan. CM needs complete at this time.    0816 AM: Chart reviewed. CM acknowledged d/c order. RW still pending with [facility]. CM sent message to [facility] to inquire about status of order.      04/27/25 0848   Services At/After Discharge   Transition of Care Consult (CM Consult) Discharge Planning   Services At/After Discharge DME;Home Health   [place] Resource Information Provided? No   Mode of Transport at Discharge Other (see comment)  (Family)   Hospital Transport Time of Discharge 1000   Confirm Follow Up Transport Family   Condition of Participation: Discharge Planning   The Plan for Transition of Care is related to the following treatment goals: Return home with HH   The Patient and/or Patient Representative was provided with a Choice of Provider? Patient   The Patient and/Or Patient Representative agree with the Discharge Plan? Yes     JYOTI Javier  Care Management  [facility]   [phone]  
Transition of Care Plan:    RUR: 11% \"low risk\"  Prior Level of Functioning: Independent with ADL's  Disposition: Home with family support and [facility]  MIKIE: 4/28  If SNF or IPR: Date FOC offered: N/A  Follow up appointments: PCP/Specialists as indicated  DME needed: RW pending    Transportation at discharge: Family to transport   IM/IMM Medicare/ letter given: N/A  Is patient a  and connected with VA? No  Caregiver Contact: Annika De La Fuente (mother), [phone]  Discharge Caregiver contacted prior to discharge? To be contacted  Care Conference needed? Not at this time   Barriers to discharge: CTS    1553 PM: CM acknowledged pt need for rolling walker. CM sent order to [facility].    1140 AM: Chart reviewed. CM following for d/c planning. Pt to return home with family and [facility] services. CM to update [facility] with d/c plan upon d/c.     JYOTI Javier  Care Management  [facility]   [phone]  
Transition of Care Plan:    RUR: 12% \"low risk\"  Prior Level of Functioning: Independent with ADL's  Disposition: Home with family support and [facility]  MIKIE: 4/24  If SNF or IPR: Date FOC offered: N/A  Follow up appointments: PCP/Specialists as indicated  DME needed: None   Transportation at discharge: Family to transport   IM/IMM Medicare/ letter given: N/A  Is patient a [place] and connected with VA? No  Caregiver Contact: Annika De La Fuente (mother), [phone]  Discharge Caregiver contacted prior to discharge? To be contacted  Care Conference needed? Not at this time   Barriers to discharge: CTS    1558 PM: Chart reviewed. CM following for d/c planning. CM received HH acceptance from [facility]. CM to make pt aware and place info on AVS.    JYOTI Javier  Care Management  [facility]   [phone]  
Transition of Care Plan:    RUR: 16% \"medium risk\"  Prior Level of Functioning: Independent with ADL's  Disposition: Home with family support and HH (referrals pending)  MIKIE: 4/24  If SNF or IPR: Date FOC offered: N/A  Follow up appointments: PCP/Specialists as indicated  DME needed: None   Transportation at discharge: Family to transport   IM/IMM Medicare/ letter given: N/A  Is patient a [place] and connected with VA? No  Caregiver Contact: Annika De La Fuente (mother), [phone]  Discharge Caregiver contacted prior to discharge? To be contacted  Care Conference needed? Not at this time   Barriers to discharge: CTS    1533 PM: Chart reviewed. CM acknowledged HH recommendation. CM to send HH referrals.    JYOTI Javier  Care Management  [facility]   [phone]  
Transition of Care Plan:    RUR: 9% \"low risk\"  Prior Level of Functioning: Independent with ADL's  Disposition: Home with family support   MIKIE: 4/21  If SNF or IPR: Date FOC offered: N/A  Follow up appointments: PCP/Specialists as indicated  DME needed: None   Transportation at discharge: Family to transport   IM/IMM Medicare/ letter given: N/A  Is patient a  and connected with VA? No  Caregiver Contact: Annika De La Fuente (mother), [phone]  Discharge Caregiver contacted prior to discharge? To be contacted  Care Conference needed? Not at this time   Barriers to discharge: Plavix wash out, CABG w/u    1436 PM: Chart reviewed. CM following for d/c planning. Pt is undergoing CABG work up, tentative surgery date for Monday. CM to continue to follow.    JYOTI Javier  Care Management  [facility]   [phone]  
Transition of Care Plan:    RUR: 9% \"low risk\"  Prior Level of Functioning: Independent with ADL's  Disposition: Home with family support   MIKIE: 4/24  If SNF or IPR: Date FOC offered: N/A  Follow up appointments: PCP/Specialists as indicated  DME needed: None   Transportation at discharge: Family to transport   IM/IMM Medicare/ letter given: N/A  Is patient a  and connected with VA? No  Caregiver Contact: Annika De La Fuente (mother), [phone]  Discharge Caregiver contacted prior to discharge? To be contacted  Care Conference needed? Not at this time   Barriers to discharge: CABG 4/21, CTS    1543 PM: Chart reviewed. CM following for d/c planning. Pt is POD 0. CM to continue to follow.     JYOTI Javier  Care Management  [facility]   [phone]  
Transition of Care Plan:    RUR: 9% \"low risk\"  Prior Level of Functioning: Independent with ADL's  Disposition: Home with family support   MIKIE: 4/24  If SNF or IPR: Date FOC offered: N/A  Follow up appointments: PCP/Specialists as indicated  DME needed: None   Transportation at discharge: Family to transport   IM/IMM Medicare/ letter given: N/A  Is patient a  and connected with VA? No  Caregiver Contact: Annika De La Fuente (mother), [phone]  Discharge Caregiver contacted prior to discharge? To be contacted  Care Conference needed? Not at this time   Barriers to discharge: Plavix wash out, CABG on 4/21    0751 AM: Chart reviewed. CM following for d/c planning. Pt to go for CABG on 4/21. CM to continue to follow should CM needs arise.     JYOTI Javier  Care Management  [facility]   [phone]  
Independent   Active  No   Patient's  Info Family or Medicaid   Mode of Transportation Car   Occupation Unemployed   Discharge Planning   Type of Residence House   Living Arrangements Parent   Current Services Prior To Admission None   Potential Assistance Needed N/A   DME Ordered? No   Potential Assistance Purchasing Medications No   Type of Home Care Services None   Patient expects to be discharged to: House     Advance Care Planning     General Advance Care Planning (ACP) Conversation    Date of Conversation: 4/14/2025  Conducted with: Patient with Decision Making Capacity  Other persons present: None    Healthcare Decision Maker:   Primary Decision Maker: Annika De La Fuente - [facility] - [phone]     Today we documented Decision Maker(s) consistent with Legal Next of Kin hierarchy.  Content/Action Overview:  Has NO ACP documents-Information provided  Reviewed DNR/DNI and patient elects Full Code (Attempt Resuscitation)    Length of Voluntary ACP Conversation in minutes:  <16 minutes (Non-Billable)    KENNEDI REYES   [phone]

## 2025-04-27 NOTE — PROGRESS NOTES
[facility]      Name: Michael Fournier   : 1980   MRN: [id number]   Date: 2025      Brief patient summary:  Admitted 25 with NSTEMI  Significant PMH: Asthma, DM, Htn, smoker  LHC:  Severe proximal LAD, diffuse mid LAD disease, severe ISR of RCA KATALINA stent, also with subtotalled D1, superior branch of OM1, and OM2   Echocardiogram:  LVEF 55-60%. No abnormal findings        PRINCIPLE ICU DIAGNOSIS:  S/P CABG  DM 2  L submandibular abscess  H/O Htn      COMPREHENSIVE [facility] ASSESSMENT/PLAN     CAD  S/P CABG  Post op hypotension/vasoplegia  Ventilator status  DM 2  H/O Htn  L submandibular sebaceous cyst/abscess         Current vasopressors: none  Current HD support devices: none  DVT prophylaxis: SCDs  SUP: none      PLAN   Post op mgmt per CSS  I will leave packing drain in today and remove tomorrow  Ultimately, after discharge, will need re-eval, probably by a general surgeon for capsulectomy   OK to narrow abx coverage t cefazolin and complete 7 days        HPI/Consult/Subjective:   HPI:  Admitted to  Service  with USAP/NSTEMI. Cath as noted above with CSS consult performed . He was also noted to have a subcutaneous mass just under the L mandible for which he underwent MRI and CT scans and ultimately underwent US guided aspiration  with the resultant cytology negative for malignancy and micro studies revealing staph epidermidis as well as mixed GPCs and GPRs.     Underwent 2V vessel CABG by Dr Johnson on  and was admitted to ICU directly from OR. [facility] Service asked to assist in post op mgmt and oversee rapid weaning      24 Hr Events 2025:    Initial [facility] Consult. L submandibular abscess incised and drained   Extubated yest evening. Tolerating well. Up in chair this morning. Cognition intact. NAD. Neck abscess/cyst still draining murky fluid. Packing drain left in place      SUBJ: NAD      Home Medications:   Reviewed    Objective:   Vital 
      Hospitalist Progress Note    NAME:   Michael Fournier   : 1980   MRN: [id number]     Date/Time: 2025 6:32 PM  Patient PCP: Marbella Perkins APRN - NP    Estimated discharge date:   Barriers: CABG Monday      Assessment / Plan:    Unstable angina  CAD status post KATALINA  Essential hypertension  Hyperlipidemia  Admit to stepdown unit  Start Nitropaste  Therapeutic Lovenox now-defer ongoing anticoagulation to cardiologist  Cardiology consulted, greatly appreciate their expertise  Obtain TTE  Full-strength aspirin given prior to presentation  Continue PTA aspirin and Brilinta  Continue PTA amlodipine, atorvastatin, lisinopril, metoprolol  N.p.o. at midnight  Cardiac rehab consulted    No chest pains this morning  Diet resumed  Heparin drip added and catheter Monday  Continue with ACS medication and Imdur    S/p cath severe LAD disease, subtotalled diagonal, sub totalled superior branch of OM1, subtotalled OM2, 80% ISR of ostial RCA   Cardio discussed with CTS need for CABG  Heparin drip resume  Brilinta held  - CABG planned for Monday    Left jaw swelling  Hx of hair folliculitis   Immature abscess?  Tender but nonfluctuant on exam  Will get x-ray  Discussed with surgery, patient will need maxillofacial specialty if needed to be drained  Will get MRI scan to check if there is drainable fluid  Either will check with IR or transfer if drainage needed  MRI showing 2.4 cm superficial fluid collection/mass overlying the inferior pole of the left parotid gland, not significantly changed dating back to ;   - underwent IR guided biopsy/drainage , waiting on cultures and path    Leukocytosis - improved  No signs for infection no fever  Unlikely above folliculitis causing this leukocytosis  Will send blood culture  Continue with Doxy  Patient also receive dose of prednisone for allergy contributing to this leukocytosis     Insulin-dependent diabetes mellitus  Decrease PTA glargine to 30 units 
      Hospitalist Progress Note    NAME:   Michael Fournier   : 1980   MRN: [id number]     Date/Time: 4/15/2025 3:00 PM  Patient PCP: Marbella Perkins APRN - NP    Estimated discharge date:   Barriers: ACS workup      Assessment / Plan:    Unstable angina  CAD status post KATALINA  Essential hypertension  Hyperlipidemia  Admit to stepdown unit  Start Nitropaste  Therapeutic Lovenox now-defer ongoing anticoagulation to cardiologist  Cardiology consulted, greatly appreciate their expertise  Obtain TTE  Full-strength aspirin given prior to presentation  Continue PTA aspirin and Brilinta  Continue PTA amlodipine, atorvastatin, lisinopril, metoprolol  N.p.o. at midnight  Cardiac rehab consulted    No chest pains this morning  Diet resumed  Heparin drip added and catheter Monday  Continue with ACS medication and Imdur    S/p cath severe LAD disease, subtotalled diagonal, sub totalled superior branch of OM1, subtotalled OM2, 80% ISR of ostial RCA   Cardio discussed with CTS need for CABG  Heparin drip resume  Brilinta held      Left jaw swelling  Hx of hair folliculitis   Immature abscess?  Tender but nonfluctuant on exam  Will get x-ray  Discussed with surgery, patient will need maxillofacial specialty if needed to be drained  Will get MRI scan to check if there is drainable fluid  Either will check with IR or transfer if drainage needed  4/15   MRI today, will decide after the result      Leukocytosis  No signs for infection no fever  Unlikely above folliculitis causing this leukocytosis  Will send blood culture  Continue with Doxy  Patient also receive dose of prednisone for allergy contributing to this leukocytosis to     Insulin-dependent diabetes mellitus  Decrease PTA glargine to 30 units nightly  -Titrate as indicated  Corrective coverage insulin  Accu-Cheks  Diabetic diet  Hypoglycemia protocol in place     Tobacco use  3 minutes tobacco cessation counseling provided with emphasis on adverse 
      Hospitalist Progress Note    NAME:   Michael Fournier   : 1980   MRN: [id number]     Date/Time: 2025 12:12 PM  Patient PCP: Marbella Perkins APRN - NP    Estimated discharge date:   Barriers: ACS workup      Assessment / Plan:    Unstable angina  CAD status post KATALINA  Essential hypertension  Hyperlipidemia  Admit to stepdown unit  Start Nitropaste  Therapeutic Lovenox now-defer ongoing anticoagulation to cardiologist  Cardiology consulted, greatly appreciate their expertise  Obtain TTE  Full-strength aspirin given prior to presentation  Continue PTA aspirin and Brilinta  Continue PTA amlodipine, atorvastatin, lisinopril, metoprolol  N.p.o. at midnight  Cardiac rehab consulted    No chest pains this morning  Diet resumed  Heparin drip added and catheter Monday  Continue with ACS medication and Imdur     Insulin-dependent diabetes mellitus  Decrease PTA glargine to 30 units nightly  -Titrate as indicated  Corrective coverage insulin  Accu-Cheks  Diabetic diet  Hypoglycemia protocol in place     Tobacco use  3 minutes tobacco cessation counseling provided with emphasis on adverse cardiovascular effects of ongoing tobacco use.  Patient reports only smoking 1 black and mild on occasion-strongly encouraged total cessation.          Medical Decision Making:   I personally reviewed labs: Yes CBC, BMP  I personally reviewed imaging: Yes  I personally reviewed EKG: Yes  Toxic drug monitoring: Yes platelet on heparin drip  Discussed case with: Patient, RN, cardio        Code Status: Full code  DVT Prophylaxis: Heparin drip  GI Prophylaxis:    Subjective:     Chief Complaint / Reason for Physician Visit  \"Admitted for ACS workup  History of a stent in the past  Has no chest pain this morning  \".  Discussed with RN events overnight.       Objective:     VITALS:   Last 24hrs VS reviewed since prior progress note. Most recent are:  Patient Vitals for the past 24 hrs:   BP Temp Temp src Pulse Resp SpO2 Height 
      Hospitalist Progress Note    NAME:   Michael Fournier   : 1980   MRN: [id number]     Date/Time: 2025 7:07 PM  Patient PCP: Marbella Perkins APRN - NP    Estimated discharge date:   Barriers: CABG Monday      Assessment / Plan:    Unstable angina  CAD status post KATALINA  Essential hypertension  Hyperlipidemia  Admit to stepdown unit  Start Nitropaste  Therapeutic Lovenox now-defer ongoing anticoagulation to cardiologist  Cardiology consulted, greatly appreciate their expertise  Obtain TTE  Full-strength aspirin given prior to presentation  Continue PTA aspirin and Brilinta  Continue PTA amlodipine, atorvastatin, lisinopril, metoprolol  N.p.o. at midnight  Cardiac rehab consulted    No chest pains this morning  Diet resumed  Heparin drip added and catheter Monday  Continue with ACS medication and Imdur    S/p cath severe LAD disease, subtotalled diagonal, sub totalled superior branch of OM1, subtotalled OM2, 80% ISR of ostial RCA   Cardio discussed with CTS need for CABG  Heparin drip resume  Brilinta held  - CABG planned for Monday    Left jaw swelling  Hx of hair folliculitis   Immature abscess?  Tender but nonfluctuant on exam  Will get x-ray  Discussed with surgery, patient will need maxillofacial specialty if needed to be drained  Will get MRI scan to check if there is drainable fluid  Either will check with IR or transfer if drainage needed  MRI done, waiting on read    Leukocytosis  No signs for infection no fever  Unlikely above folliculitis causing this leukocytosis  Will send blood culture  Continue with Doxy  Patient also receive dose of prednisone for allergy contributing to this leukocytosis     Insulin-dependent diabetes mellitus  Decrease PTA glargine to 30 units nightly  -Titrate as indicated  Corrective coverage insulin  Accu-Cheks  Diabetic diet  Hypoglycemia protocol in place     Tobacco use  3 minutes tobacco cessation counseling provided with emphasis on adverse 
      Hospitalist Progress Note    NAME:   Michael Fournier   : 1980   MRN: [id number]     Date/Time: 2025 6:24 PM  Patient PCP: Marbella Perkins APRN - NP    Estimated discharge date:   Barriers: Possible CABG Monday      Assessment / Plan:    Unstable angina  CAD status post KATALINA  Essential hypertension  Hyperlipidemia  Admit to stepdown unit  Start Nitropaste  Therapeutic Lovenox now-defer ongoing anticoagulation to cardiologist  Cardiology consulted, greatly appreciate their expertise  Obtain TTE  Full-strength aspirin given prior to presentation  Continue PTA aspirin and Brilinta  Continue PTA amlodipine, atorvastatin, lisinopril, metoprolol  N.p.o. at midnight  Cardiac rehab consulted    No chest pains this morning  Diet resumed  Heparin drip added and catheter Monday  Continue with ACS medication and Imdur    S/p cath severe LAD disease, subtotalled diagonal, sub totalled superior branch of OM1, subtotalled OM2, 80% ISR of ostial RCA   Cardio discussed with CTS need for CABG  Heparin drip resume  Brilinta held  - Possible CABG planned for Monday    Left jaw swelling  Hx of hair folliculitis   Immature abscess?  Tender but nonfluctuant on exam  Will get x-ray  Discussed with surgery, patient will need maxillofacial specialty if needed to be drained  Will get MRI scan to check if there is drainable fluid  Either will check with IR or transfer if drainage needed  MRI showing 2.4 cm superficial fluid collection/mass overlying the inferior pole of the left parotid gland, not significantly changed dating back to ;   - underwent IR guided biopsy/drainage , waiting on cultures and path, growing staph epi thus far, continue doxycicline    Anxiety  Anxious about possible CABG Monday  - gave dose of po ativan    Leukocytosis - improved  No signs for infection no fever  Unlikely above folliculitis causing this leukocytosis  Will send blood culture  Continue with Doxy  Patient also receive dose 
      Hospitalist Progress Note    NAME:   Michael Fournier   : 1980   MRN: [id number]     Date/Time: 2025 3:34 PM  Patient PCP: Marbella Perkins APRN - NP    Estimated discharge date:   Barriers: CABG tomorrow      Assessment / Plan:    Unstable angina  CAD status post KATALINA  Essential hypertension  Hyperlipidemia  Admit to stepdown unit  Start Nitropaste  Therapeutic Lovenox now-defer ongoing anticoagulation to cardiologist  Cardiology consulted, greatly appreciate their expertise  Obtain TTE  Full-strength aspirin given prior to presentation  Continue PTA aspirin and Brilinta  Continue PTA amlodipine, atorvastatin, lisinopril, metoprolol  N.p.o. at midnight  Cardiac rehab consulted    No chest pains this morning  Diet resumed  Heparin drip added and catheter Monday  Continue with ACS medication and Imdur    S/p cath severe LAD disease, subtotalled diagonal, sub totalled superior branch of OM1, subtotalled OM2, 80% ISR of ostial RCA   Cardio discussed with CTS need for CABG  Heparin drip resume  Brilinta held  - CABG planned for tomorrow    Left jaw swelling -> abscess  Hx of hair folliculitis   Immature abscess?  Tender but nonfluctuant on exam  Will get x-ray  Discussed with surgery, patient will need maxillofacial specialty if needed to be drained  Will get MRI scan to check if there is drainable fluid  Either will check with IR or transfer if drainage needed  MRI showing 2.4 cm superficial fluid collection/mass overlying the inferior pole of the left parotid gland, not significantly changed dating back to ;   - underwent IR guided biopsy/drainage , cultures growing staph epi. Should be covered with doxycycline however patient reporting the abscess feels like it's enlarging. Added CTX  - repeat CT showed slight enlargement of abscess. Messaged CT Surgery to inquire if this would delay surgery, suspect not however waiting to hear back.  - path:  While the leading differential diagnosis 
      Hospitalist Progress Note    NAME:   Michael Fournier   : 1980   MRN: [id number]     Date/Time: 2025 2:48 PM  Patient PCP: Marbella Perkins APRN - NP    Estimated discharge date:   Barriers: ACS workup      Assessment / Plan:    Unstable angina  CAD status post KATALINA  Essential hypertension  Hyperlipidemia  Admit to stepdown unit  Start Nitropaste  Therapeutic Lovenox now-defer ongoing anticoagulation to cardiologist  Cardiology consulted, greatly appreciate their expertise  Obtain TTE  Full-strength aspirin given prior to presentation  Continue PTA aspirin and Brilinta  Continue PTA amlodipine, atorvastatin, lisinopril, metoprolol  N.p.o. at midnight  Cardiac rehab consulted    No chest pains this morning  Diet resumed  Heparin drip added and catheter Monday  Continue with ACS medication and Imdur    S/p cath severe LAD disease, subtotalled diagonal, sub totalled superior branch of OM1, subtotalled OM2, 80% ISR of ostial RCA   Cardio discussed with CTS need for CABG  Heparin drip resume  Brilinta held      Left jaw swelling  Hx of hair folliculitis   Immature abscess?  Tender but nonfluctuant on exam  Will get x-ray  Discussed with surgery, patient will need maxillofacial specialty if needed to be drained  Will get MRI scan to check if there is drainable fluid  Either will check with IR or transfer if drainage needed     Insulin-dependent diabetes mellitus  Decrease PTA glargine to 30 units nightly  -Titrate as indicated  Corrective coverage insulin  Accu-Cheks  Diabetic diet  Hypoglycemia protocol in place     Tobacco use  3 minutes tobacco cessation counseling provided with emphasis on adverse cardiovascular effects of ongoing tobacco use.  Patient reports only smoking 1 black and mild on occasion-strongly encouraged total cessation.      Medical Decision Making:   I personally reviewed labs: Yes CBC, BMP  I personally reviewed imaging: Yes  I personally reviewed EKG: Yes  Toxic drug 
     Packing drain removed from abscess/cyst on L without complication. [facility] Service will sign off. Please call if we can be of further assistance.    Manuel Morrissey MD  [facility]  [facility] 4th floor [facility] phone: [phone]  [facility] 7th floor [facility] phone: [phone]  [facility] phone: [phone]  4/23/2025     
    [facility]     Progress Note      4/13/2025 8:59 AM  NAME: Michael Fournier   MRN:  [id number]   Admit Diagnosis: Unstable angina (HCC) [I20.0]                Assessment:       Chest pain  Increased troponin  NSTEMI     - CAD with cath 6/2023 with PCI of RCA, cath 10/2024 with unsucessful  of diagonal, Cath 8.2924 with PCI of OM with 2.5 x 22 post dilated up to 3.5, PTCA of RCA up to 4.5mm  - Iodine allergy, hematomo after first cath, none after subsequent cath  - Echo 2023 nl EF  - Sinus  - DM  - HTN  - dyslipidemia  - Active tobacco, advised cessation  - Prior Etoh  - No drugs  Single, girlfriend for 20yrs, one child, unemployed [place]     Cardiologist:  Tomi at [facility]                     Plan:        - Chest pain, no acute ST changes, increased hs troponin 103 consistent with NSTEMI  - Euvolemic  - Sinus     Cath Monday AM, all r/b/a reviewed, consent signed     - Add heparin ggt  - Cont asa, brilinta  - Cont metoprolol  - Cont lisinopril  - Cont amlodipine  - Change imdur to NTP  - Cont atorvastatin, repatha         [x]       High complexity decision making was performed in this patient at high risk for decompensation with multiple organ involvement.     We discussed the expected course, resolution and complications of the diagnoses in detail.  Medication risk, benefits, costs, interactions, and alternatives were discussed as indicated.  I advised him to contact the office if his condition worsens, changes or fails to improve as anticipated.  Patient expressed understanding with the diagnoses  and plan.    Subjective:     Michael Fournier denies chest pain, dyspnea.  Discussed with RN events overnight.     Review of Systems:    Symptom Y/N Comments  Symptom Y/N Comments   Fever/Chills N   Chest Pain N    Poor Appetite N   Edema N    Cough N   Abdominal Pain N    Sputum N   Joint Pain N    SOB/CABRALES N   Pruritis/Rash N    Nausea/vomit N   Tolerating PT/OT Y    Diarrhea N   Tolerating Diet 
    [facility]     Progress Note      4/15/2025 6:44 AM  NAME: Michael Fournier   MRN:  [id number]   Admit Diagnosis: Unstable angina (HCC) [I20.0]                Assessment:       Chest pain  Increased troponin  NSTEMI     - CAD with cath 6/2023 with PCI of RCA, cath 10/2024 with unsucessful  of diagonal, Cath 8.2924 with PCI of OM with 2.5 x 22 post dilated up to 3.5, PTCA of RCA up to 4.5mm.  Cath 4/2025 diffuse CAD, 70% prox LAD, 50% mid LAD, subtotalled diagonal.  Patent OM1 stent, superior branch subtotalled, OM2 subtotalled, 80% ISR of ostial RCA.  - Iodine allergy, hematomo after first cath, none after subsequent cath  - Echo 2023 nl EF  - Sinus  - DM  - HTN  - dyslipidemia  - Active tobacco, advised cessation  - Prior Etoh  - No drugs  Single, girlfriend for 20yrs, one child, unemployed [place]     Cardiologist:  Tomi at [facility]                     Plan:        - Chest pain, no acute ST changes, increased hs troponin 103 consistent with NSTEMI  - Euvolemic  - Sinus     Cath with severe LAD disease, subtotalled diagonal, sub totalled superior branch of OM1, subtotalled OM2, 80% ISR of ostial RCA.  Given diabetic and prior failuire of KATALINA favor CABG     - Cont heparin  - Cont asa  - Stop brilinta, last doase 4/13  - Cont metoprolol  - Hold lisinopril  - Cont amlodipine  - Change imdur to NTP  - cr stable post cath  - Cont atorvastatin, repatha    CABG timing per Dr. Loco, can check platelet assay    Follow up with [facility] Dr. Krishna           [x]       High complexity decision making was performed in this patient at high risk for decompensation with multiple organ involvement.     We discussed the expected course, resolution and complications of the diagnoses in detail.  Medication risk, benefits, costs, interactions, and alternatives were discussed as indicated.  I advised him to contact the office if his condition worsens, changes or fails to improve as anticipated.  Patient expressed 
    [facility]     Progress Note      4/16/2025 6:06 AM  NAME: Michael Fournier   MRN:  [id number]   Admit Diagnosis: Unstable angina (HCC) [I20.0]                Assessment:       Chest pain  Increased troponin  NSTEMI     - CAD with cath 6/2023 with PCI of RCA, cath 10/2024 with unsucessful  of diagonal, Cath 8.2924 with PCI of OM with 2.5 x 22 post dilated up to 3.5, PTCA of RCA up to 4.5mm.  Cath 4/2025 diffuse CAD, 70% prox LAD, 50% mid LAD, subtotalled diagonal.  Patent OM1 stent, superior branch subtotalled, OM2 subtotalled, 80% ISR of ostial RCA.  - Iodine allergy, hematomo after first cath, none after subsequent cath  - Echo 2023 nl EF  - Sinus  - DM  - HTN  - dyslipidemia  - Active tobacco, advised cessation  - Prior Etoh  - No drugs  Single, girlfriend for 20yrs, one child, unemployed [place]     Cardiologist:  Tomi at [facility]                     Plan:        - Chest pain, no acute ST changes, increased hs troponin 103 consistent with NSTEMI  - Euvolemic  - Sinus     Cath with severe LAD disease, subtotalled diagonal, sub totalled superior branch of OM1, subtotalled OM2, 80% ISR of ostial RCA.  Given diabetic and prior failuire of KATALINA favor CABG     - Cont heparin  - Cont asa  - Stop brilinta, last doase 4/13  - Cont metoprolol  - Hold lisinopril  - Cont amlodipine  - Change imdur to NTP  - cr stable post cath  - Cont atorvastatin, repatha    CABG timing per Dr. Loco, tentatively Monday.    Follow up with [facility] Dr. Krishna           [x]       High complexity decision making was performed in this patient at high risk for decompensation with multiple organ involvement.     We discussed the expected course, resolution and complications of the diagnoses in detail.  Medication risk, benefits, costs, interactions, and alternatives were discussed as indicated.  I advised him to contact the office if his condition worsens, changes or fails to improve as anticipated.  Patient expressed 
    [facility]     Progress Note      4/17/2025 8:57 AM  NAME: Michael Fournier   MRN:  [id number]   Admit Diagnosis: Unstable angina (HCC) [I20.0]                Assessment:       Chest pain  Increased troponin  NSTEMI     - CAD with cath 6/2023 with PCI of RCA, cath 10/2024 with unsucessful  of diagonal, Cath 8.2924 with PCI of OM with 2.5 x 22 post dilated up to 3.5, PTCA of RCA up to 4.5mm.  Cath 4/2025 diffuse CAD, 70% prox LAD, 50% mid LAD, subtotalled diagonal.  Patent OM1 stent, superior branch subtotalled, OM2 subtotalled, 80% ISR of ostial RCA.  - Iodine allergy, hematomo after first cath, none after subsequent cath  - Echo 2023 nl EF  - Sinus  - DM  - HTN  - dyslipidemia  - Active tobacco, advised cessation  - Prior Etoh  - No drugs  Single, girlfriend for 20yrs, one child, unemployed [place]     Cardiologist:  Tomi at [facility]                     Plan:        - Chest pain, no acute ST changes, increased hs troponin 103 consistent with NSTEMI  - Euvolemic  - Sinus     Cath with severe LAD disease, subtotalled diagonal, sub totalled superior branch of OM1, subtotalled OM2, 80% ISR of ostial RCA.  Given diabetic and prior failuire of KATALINA favor CABG     - Cont heparin  - Cont asa  - Stop brilinta, last doase 4/13  - Cont metoprolol  - Hold lisinopril  - Cont amlodipine  - Change imdur to NTP  - cr stable post cath  - Cont atorvastatin, repatha    CABG timing per Dr. Loco, tentatively Monday.    Follow up with [facility] Dr. Krishna    Please call Dr. Oconnell with any questions, otherwise plan per Dr. Loco           [x]       High complexity decision making was performed in this patient at high risk for decompensation with multiple organ involvement.     We discussed the expected course, resolution and complications of the diagnoses in detail.  Medication risk, benefits, costs, interactions, and alternatives were discussed as indicated.  I advised him to contact the office if his condition 
    [facility]     Progress Note      4/18/2025 9:57 AM  NAME: Michael Fournier   MRN:  [id number]   Admit Diagnosis: Unstable angina (HCC) [I20.0]                Assessment:       Chest pain  Increased troponin  NSTEMI     - CAD with cath 6/2023 with PCI of RCA, cath 10/2024 with unsucessful  of diagonal, Cath 8.2924 with PCI of OM with 2.5 x 22 post dilated up to 3.5, PTCA of RCA up to 4.5mm.  Cath 4/2025 diffuse CAD, 70% prox LAD, 50% mid LAD, subtotalled diagonal.  Patent OM1 stent, superior branch subtotalled, OM2 subtotalled, 80% ISR of ostial RCA.  - Iodine allergy, hematomo after first cath, none after subsequent cath  - Echo 2023 nl EF  - Sinus  - DM  - HTN  - dyslipidemia  - Active tobacco, advised cessation  - Prior Etoh  - No drugs  Single, girlfriend for 20yrs, one child, unemployed [place]     Cardiologist:  Tomi at [facility]                     Plan:        - Chest pain, no acute ST changes, increased hs troponin 103 consistent with NSTEMI  - Euvolemic  - Sinus     Cath with severe LAD disease, subtotalled diagonal, sub totalled superior branch of OM1, subtotalled OM2, 80% ISR of ostial RCA.  Given diabetic and prior failuire of KATALINA favor CABG     - Cont heparin  - Cont asa  - Stop brilinta, last doase 4/13  - Cont metoprolol  - Hold lisinopril  - Cont amlodipine  - Change imdur to NTP  - cr stable post cath  - Cont atorvastatin, repatha    CABG timing per Dr. Loco, tentatively Monday.    Follow up with [facility] Dr. Krishna    Please call Dr. Oconnell with any questions, otherwise plan per Dr. Loco           [x]       High complexity decision making was performed in this patient at high risk for decompensation with multiple organ involvement.     We discussed the expected course, resolution and complications of the diagnoses in detail.  Medication risk, benefits, costs, interactions, and alternatives were discussed as indicated.  I advised him to contact the office if his condition 
    [facility]     Progress Note      4/20/2025 8:56 AM  NAME: Michael Fournier   MRN:  [id number]   Admit Diagnosis: Unstable angina (HCC) [I20.0]                Assessment:       Chest pain  Increased troponin  NSTEMI     - CAD with cath 6/2023 with PCI of RCA, cath 10/2024 with unsucessful  of diagonal, Cath 8.2924 with PCI of OM with 2.5 x 22 post dilated up to 3.5, PTCA of RCA up to 4.5mm.  Cath 4/2025 diffuse CAD, 70% prox LAD, 50% mid LAD, subtotalled diagonal.  Patent OM1 stent, superior branch subtotalled, OM2 subtotalled, 80% ISR of ostial RCA.  - Iodine allergy, hematomo after first cath, none after subsequent cath  - Echo 2023 nl EF  - Sinus  - DM  - HTN  - dyslipidemia  - Active tobacco, advised cessation  - Prior Etoh  - No drugs  Single, girlfriend for 20yrs, one child, unemployed [place]     Cardiologist:  Tomi at [facility]                     Plan:        - Chest pain, no acute ST changes, increased hs troponin 103 consistent with NSTEMI  - Euvolemic  - Sinus     Cath with severe LAD disease, subtotalled diagonal, sub totalled superior branch of OM1, subtotalled OM2, 80% ISR of ostial RCA.  Given diabetic and prior failuire of KATALINA favor CABG     - Cont heparin  - Cont asa  - Stop brilinta, last doase 4/13  - Cont metoprolol  - Hold lisinopril  - Cont amlodipine  - Change imdur to NTP  - cr stable post cath  - Cont atorvastatin, repatha    CABG timing per Dr. Loco, tentatively tomorrow  Replete K.    Follow up with [facility] Dr. Krishna               [x]       High complexity decision making was performed in this patient at high risk for decompensation with multiple organ involvement.     We discussed the expected course, resolution and complications of the diagnoses in detail.  Medication risk, benefits, costs, interactions, and alternatives were discussed as indicated.  I advised him to contact the office if his condition worsens, changes or fails to improve as anticipated.  Patient 
  Physician Progress Note      PATIENT:               JUSTINO CLARKE  CSN #:                  [id number]  :                       1980  ADMIT DATE:       2025 7:33 PM  DISCH DATE:  RESPONDING  PROVIDER #:        Bernardino Vargas MD          QUERY TEXT:    Please clarify in documentation the relationship, if any, between  NSTEMI and ISR. Are the conditions:    The clinical indicators include:  -Diabetic with severe proximal LAD, diffuse mid LAD disease, severe ISR of RCA   KATALINA stent, also with subtotaled D1, superior branch of OM1, and OM2 will   evaluate for CABG.  Right dominant system.  70% prox LAD, ruptured plaque,   diffuse mid LAD disease up to 50%, subtotaled D1, Patent OM1 stent, superior   branch of OM1 subtotaled, OM2 subtotaled, 80% ISR of ostial RCA stent. ([facility]   )  Options provided:  -- ISR related to or associated with NSTEMI  -- Unrelated to each other  -- Other - I will add my own diagnosis  -- Disagree - Not applicable / Not valid  -- Disagree - Clinically unable to determine / Unknown  -- Refer to Clinical Documentation Reviewer    PROVIDER RESPONSE TEXT:    ISR is related to or associated with NSTEMI.    Query created by: Sharla Fuentes on 2025 2:14 PM      Electronically signed by:  Bernardino Vargas MD 2025 5:44 PM          
..End of Shift Note    Bedside shift change report given to NEHA Gilbetr (oncoming nurse) by Bjorn Kwan RN (offgoing nurse).  Report included the following information SBAR    Shift worked:  0700 - 1900     Shift summary and any significant changes:    Pt. Tolerated all medication w/o issue.  Blood/ Glucose continues to be monitored.  Heparin drip continued.     Concerns for physician to address: No     Zone phone for oncoming shift:  No       Activity:  Level of Assistance: Independent  Number times ambulated in hallways past shift: 0  Number of times OOB to chair past shift: 0    Cardiac:   Cardiac Monitoring: Yes      Cardiac Rhythm: Sinus rhythm    Access:  Current line(s): PIV     Genitourinary:   Urinary Status: Voiding    Respiratory:   O2 Device: None (Room air)  Chronic home O2 use?: NO  Incentive spirometer at bedside: NO    GI:  Last BM (including prior to admit): 04/13/25  Current diet:  ADULT DIET; Regular; Low Fat/Low Chol/High Fiber/2 gm Na  ADULT ORAL NUTRITION SUPPLEMENT; Breakfast, Lunch, Dinner; Low Calorie/High Protein Oral Supplement  Passing flatus: YES    Pain Management:   Patient states pain is manageable on current regimen: YES    Skin:  Oren Scale Score: 21  Interventions: Wound Offloading (Prevention Methods): Repositioning, Turning, Pillows    Patient Safety:  Fall Risk: Nursing Judgement-Fall Risk High(Add Comments): No  Fall Risk Interventions  Nursing Judgement-Fall Risk High(Add Comments): No  Toilet Every 2 Hours-In Advance of Need: Yes  Hourly Visual Checks: Awake, In bed  Fall Visual Posted: Socks, Fall sign posted  Room Door Open: Yes  Alarm On: Bed  Patient Moved Closer to Nursing Station: No    Active Consults:   IP CONSULT TO CARDIOLOGY  IP CONSULT TO CARDIAC REHAB  IP CONSULT TO DIABETES MANAGEMENT  IP CONSULT TO INTERVENTIONAL RADIOLOGY    Length of Stay:  Expected LOS: 15  Actual LOS: 6    Bjorn Kwan RN                           
.CVICU End of Shift Note    Bedside shift change report given to NEHA Huerta (oncoming nurse) by Arpan Olvera RN (offgoing nurse).  Report included the following information SBAR    Shift worked:  7p-7a     Shift summary and any significant changes:     Pt received pain medicine     Concerns for physician to address:  Pt states \"he cannot pee unless he stands there for a little while\"       Activity:  Level of Assistance: Minimal assist, patient does 75% or more  Number times ambulated in hallways past shift: 3  Number of times OOB to chair past shift: 1    Neurologic:  Level of Consciousness: Alert (0)  Orientation Level: Oriented X4  Cognition: Follows commands  Speech: Clear    Cardiac:   Cardiac Rhythm: Sinus rhythm    Pacer Wires: Pacer Wires: V-wires           Pacer wires Capped?: NO      Pacer Wire Care Completed: YES    Respiratory:   O2 Device: None (Room air)  ISS Teaching NO   Use : NO     Volume: na    Genitourinary:   Urinary Status: Voiding    GI:  Last BM (including prior to admit): 04/23/25  Current diet:  ADULT ORAL NUTRITION SUPPLEMENT; Breakfast, Dinner; Low Calorie/High Protein Oral Supplement  ADULT DIET; Regular; 3 carb choices (45 gm/meal); Low Fat/Low Chol/High Fiber/2 gm Na; No Concentrated sweets  Passing flatus: YES    Lines/drains/airway:  Peripheral IV and Chest Tube    Peripheral IV x 2: YES    Skin:    Wound Care Documentation:  Puncture 04/14/25 Radial (Active)   Site Assessment Clean, dry, and intact 04/22/25 1900   Closure Discontinued 04/15/25 1500   Amount of Air Infused (mL) 5 mL 04/14/25 1113   Amount of Air Released (mL) 3 mL 04/14/25 1113   Estimated Amount of Air Remaining (mL) 2 mL 04/14/25 1113   Drainage Amount None 04/19/25 0710   Dressing/Treatment Transparent occluusive 04/21/25 0740   Number of days: 8          Oren Scale Score: 17  Interventions: Wound Offloading (Prevention Methods): Bed, pressure redistribution/air, Blankets, Elevate heels, Pillows, Repositioning, 
0100: Took over care of patient from previous nurse. Patient sleeping on the chair, no concerns during transfer of care.   End of Shift Note    Bedside shift change report given to NEHA Jackson (oncoming nurse) by Jennifer Cottrell RN (offgoing nurse).  Report included the following information SBAR, Cardiac Rhythm  , and Quality Measures    Shift worked:  Care started from 3129-0055     Shift summary and any significant changes:     Patient remained SR all through my care. No concerns overnight. Sternal Care done.    Concerns for physician to address:  None   Zone phone for oncoming shift:       Activity:  Level of Assistance: Minimal assist, patient does 75% or more  Number times ambulated in hallways past shift: 1  Number of times OOB to chair past shift: Patient up in chair all night    Cardiac:   Cardiac Monitoring: Yes      Cardiac Rhythm: Sinus rhythm    Access:  Current line(s): PIV     Genitourinary:   Urinary Status: Voiding, Bathroom privileges    Respiratory:   O2 Device: None (Room air)  Chronic home O2 use?: NO  Incentive spirometer at bedside: YES    GI:  Last BM (including prior to admit): 04/26/25  Current diet:  ADULT DIET; Regular; 3 carb choices (45 gm/meal); Low Fat/Low Chol/High Fiber/2 gm Na; No Concentrated sweets  ADULT ORAL NUTRITION SUPPLEMENT; Breakfast, Dinner; Diabetic Oral Supplement  Passing flatus: YES    Pain Management:   Patient states pain is manageable on current regimen: YES    Skin:  Oren Scale Score: 22  Interventions: Wound Offloading (Prevention Methods): Elevate heels, Pillows, Repositioning, Turning, Chair cushion    Patient Safety:  Fall Risk: Nursing Judgement-Fall Risk High(Add Comments): No  Fall Risk Interventions  Nursing Judgement-Fall Risk High(Add Comments): No  Toilet Every 2 Hours-In Advance of Need: Yes  Hourly Visual Checks: Eyes closed, In chair  Fall Visual Posted: Socks, Fall sign posted, Armband  Room Door Open: Deferred to promote rest  Alarm On: 
0700 Bedside and Verbal shift change report given to (oncoming) by (offgoing nurse).  Report included the following information Nurse Handoff Report, Index, Adult Overview, Surgery Report, Intake/Output, MAR, Recent Results, and Cardiac Rhythm NSR .     0800 Shift assessment completed - see flowsheets    1000 Afib RVR Began.50mg Amio Bolus, and 450 Amio Drip PIV and Metoprolol 12.5 mg    1010: MD, Ordered Converted.      
0700 Bedside and Verbal shift change report given to Declan SOLOMON and Andrade RN (oncoming nurse) by Rebecca SOLOMON (offgoing nurse). Report included the following information Nurse Handoff Report, Index, Adult Overview, Intake/Output, MAR, Recent Results, and Cardiac Rhythm a-fib rvr .      0800 Shift assessment completed. Patient ambulated the CVICU hallway - see flowsheets     8644-7180 Patient went into A-fib RVR. Suleman CHINCHILLA notified. PA to put in orders for amio bolus, amio gtt, and 1 x dose metoprolol PO. Patient converted back to NSR after interventions - see flowsheets     1200 Reassessment completed. Patient ambulated the CVICU hallway - see flowsheets     1430 V-wire cut by Suleman CHINCHILLA     1600 Reassessment completed. Patient ambulated the CVICU hallway - see flowsheets     1700 Sternal incision care completed and chest tube dressing changed     CVICU End of Shift Note    Bedside shift change report given to  (oncoming nurse) by DECLAN ROPER RN (offgoing nurse).  Report included the following information SBAR, Kardex, Procedure Summary, Intake/Output, MAR, Recent Results, and Cardiac Rhythm NSR    Shift worked:  2626-5327     Shift summary and any significant changes:     Read note above     Concerns for physician to address:         Activity:  Level of Assistance: Minimal assist, patient does 75% or more  Number times ambulated in hallways past shift: 3  Number of times OOB to chair past shift: 3    Neurologic:  Level of Consciousness: Alert (0)  Orientation Level: Oriented X4  Cognition: Follows commands, Appropriate judgement, Appropriate attention/concentration  Speech: Clear    Cardiac:   Cardiac Rhythm: Sinus rhythm    Pacer Wires: Pacer Wires: Removed           Pacer wires Capped?: N/A      Pacer Wire Care Completed: N/A    Respiratory:   O2 Device: None (Room air)  ISS Teaching YES   Use : YES     Volume: 750    Genitourinary:   Urinary Status: Voiding, Bathroom privileges    GI:  Last BM (including prior to 
0700: Bedside shift change report given to NEHA Martin (oncoming nurse) by NEHA Reyes (offgoing nurse). Report included the following information Nurse Handoff Report and Index.     0900: Dr. Vargas at the bedside and consent obtained and signed for cardiac cath tomorrow morning.   
0725 - Heparin Handoff completed with NEHA Tee.  Patient therapeutic no rate change.  
0734 Received report from NEHA Arita. Patient up to chair.     0905 working with PT. CTS at bedside increase metoprolol and giving potassium replacement. Held magnesium.     0915 Spoke to endocrinology and he will DC insulin drip     1015 Gave 40 units lantus    1200 Patient up to bathroom.     1235 Stopped insulin drip, per order. Patient returned to bed.   
0800  Dr. Morrissey at bedside. Left neck dressing checked. Redressed.   Did not eat breakfast except to drink all of the Ensure.     1100  PT/OT at bedside.    1215 returned to bed.    C/o pain 8/10 robaxin and scheduled tylenol given.   Refused lunch tray.  1305  continues to c/o pain 8/1-. Dilaudid given. Natty Puga NP, at bedside.     1500  ambulated in hallway with 1 assist using rolling walker.    1840  ambulated in hallway with rolling walker. Returned to bed.   IS up to 1500cc.   
0859 walked into the room,Patient attempts to get out of chair by pressing with his arms on chaor.Very anxious.Stated:\" I need to urinate!\" assisted patient with urinal. 150 ml of yellow urine collected. Bladder scan performed.Postvoid residual is 443 ml.  0910 NP Natty Puga at bedside for CT removal. Updated on retention.Per Natty Puga ,give patient another chance to urinate.And if attempt will not be successful,the straight cath  110 Patient urinated 125 ml.Bladder scan revealed 440 ml. Patient refused straight cath.Sated:\" I had the same problem in the past ,when I had gallbladder removal\"  1410 Romy Stout assumed care of the patient.  1745 This RN assumed care of this patient.  1800 Patient refused dinner this evening.  CVICU End of Shift Note    Bedside shift change report given to Lyla SOLOMON (oncoming nurse) by Deanna Ornelas RN (offgoing nurse).  Report included the following information SBAR, OR Summary, Procedure Summary, Intake/Output, MAR, Recent Results, Med Rec Status, Cardiac Rhythm SR-ST, Alarm Parameters , Quality Measures, and Dual Neuro Assessment    Shift worked:  8575-0492     Shift summary and any significant changes:     Anxiety,Impulsiveness,urinary retention,and issues with blood glucose control,which improved towards the evening.Very poor appetite.Declined diner     Concerns for physician to address:  None at this time.       Activity:  Level of Assistance: Minimal assist, patient does 75% or more  Number times ambulated in hallways past shift: 2  Number of times OOB to chair past shift: 3    Neurologic:  Level of Consciousness: Alert (0)  Orientation Level: Oriented X4  Cognition: Follows commands  Speech: Clear    Cardiac:   Cardiac Rhythm: Sinus rhythm    Pacer Wires: Pacer Wires: V-wires           Pacer wires Capped?: YES      Pacer Wire Care Completed: NO    Respiratory:   O2 Device: None (Room air)  ISS Teaching YES   Use : YES     Volume: 1000 ml    Genitourinary:   Urinary 
1010: Patient arrived to [facility] via stretcher. Patient is A&Ox4 on RA in NAD at this time. Code status and allergies and NPO status verified with patient prior to procedure.     Name of Procedure: Neck Mass Biopsy     Vital Signs:  VSS throughout    Samples sent to lab: cytology present for procedure      Any complications related to procedure: none identified at this time     Patient is A&Ox4, on RA, and is in NAD at this time.     1204: TRANSFER - OUT REPORT:    Verbal report given to NEHA Milan on Michael Fournier  being transferred to [facility] for routine progression of patient care       Report consisted of patient's Situation, Background, Assessment and   Recommendations(SBAR).     Information from the following report(s) Nurse Handoff Report was reviewed with the receiving nurse.           Lines:   Peripheral IV 04/11/25 Left Antecubital (Active)   Site Assessment Clean, dry & intact 04/17/25 0755   Line Status Infusing 04/17/25 0755   Line Care Connections checked and tightened 04/17/25 0755   Phlebitis Assessment No symptoms 04/17/25 0755   Infiltration Assessment 0 04/17/25 0755   Alcohol Cap Used Yes 04/17/25 0755   Dressing Status New dressing applied 04/17/25 0755   Dressing Type Transparent 04/17/25 0755       Peripheral IV 04/15/25 Posterior;Right Hand (Active)   Site Assessment Clean, dry & intact 04/17/25 0755   Line Status Flushed 04/17/25 0755   Line Care Connections checked and tightened 04/17/25 0755   Phlebitis Assessment No symptoms 04/17/25 0755   Infiltration Assessment 0 04/17/25 0755   Alcohol Cap Used Yes 04/17/25 0755   Dressing Status Clean, dry & intact 04/17/25 0755   Dressing Type Transparent 04/17/25 0755        Opportunity for questions and clarification was provided.      Patient transported with:  Patient Transport       
1100 - discharge instructions given. Pt understands and grateful for care. PIV removed.  
1415 - Dulcolax suppository given.    1435 - Patient urinated 600 ml. Post void bladder scan showed 355 ml still in bladder.     1450 - Patient attempting to have bowel movement but unable too    1604 - Patient urinated 400 ml. Post void bladder scan showed 255 ml still in bladder. Patient attempting to have bowel movement but unable too  
1536  received patient from OR intubated and sedated. Currently on insulin and precedex drips. 2 chest tubes noted to pleurevac with sanguinous drainage.     1545 labs sent. CXR and ECG obtained.     1640 Dr. Morrissey at bedside. Left jaw mass lanced. Drain in place.     1700 Dee hugger applied for temp 95.9.  precedex now off.     1830 ABG done on CPAP.  at bedside.  Temperature remains low at 95.5 despite Dee hugger. Dr. Johnson aware.     1835  extubated to 4 L NC as ordered.   
1900. End of Shift Note    Bedside shift change report given to Nay SOLOMON (oncoming nurse) by Keith Garcia RN (offgoing nurse).  Report included the following information SBAR, Kardex, Intake/Output, MAR, Recent Results, and Cardiac Rhythm NSR    Shift worked:  4874-6749     Shift summary and any significant changes:     Patient subtherapuetic on heparin drip, to be redrawn @2000, VSS and CABG workup completed per previous RN     Concerns for physician to address:       Zone phone for oncoming shift:          Activity:  Level of Assistance: Independent  Number times ambulated in hallways past shift: 0  Number of times OOB to chair past shift: 5    Cardiac:   Cardiac Monitoring: Yes      Cardiac Rhythm: Sinus rhythm    Access:  Current line(s): PIV     Genitourinary:   Urinary Status: Voiding    Respiratory:   O2 Device: None (Room air)  Chronic home O2 use?: NO  Incentive spirometer at bedside: YES    GI:  Last BM (including prior to admit): 04/13/25  Current diet:  ADULT DIET; Regular; Low Fat/Low Chol/High Fiber/2 gm Na  ADULT ORAL NUTRITION SUPPLEMENT; Breakfast, Lunch, Dinner; Low Calorie/High Protein Oral Supplement  Passing flatus: YES    Pain Management:   Patient states pain is manageable on current regimen: YES    Skin:  Oren Scale Score: 20  Interventions: Wound Offloading (Prevention Methods): Bed, pressure reduction mattress, Pillows, Repositioning, Turning (turns self)    Patient Safety:  Fall Risk: Nursing Judgement-Fall Risk High(Add Comments): No  Fall Risk Interventions  Nursing Judgement-Fall Risk High(Add Comments): No  Toilet Every 2 Hours-In Advance of Need: Yes  Hourly Visual Checks: Awake, In bed  Fall Visual Posted: Armband  Room Door Open: Deferred to decrease stimulation  Alarm On: Bed, Chair  Patient Moved Closer to Nursing Station: No    Active Consults:   IP CONSULT TO CARDIOLOGY  IP CONSULT TO CARDIAC REHAB  IP CONSULT TO DIABETES MANAGEMENT    Length of Stay:  Expected LOS: 
1927:Bedside and Verbal shift change report given to NEHA Rubio (oncoming nurse) by NEHA Jackson (offgoing nurse). Report included the following information Nurse Handoff Report, ED SBAR, Intake/Output, MAR, Recent Results, Med Rec Status, Cardiac Rhythm NSR-Sinus Tachy, Neuro Assessment, and Event Log.   2000:Shift assessment completed; see flow sheets  0000:Reassessment completed; See flow sheets  0055:Handoff report given to NEHA Rodriguez (oncoming nurse) by Joanne Bland RN (offgoing nurse).  Report included the following information SBAR, Kardex, ED Summary, Intake/Output, MAR, Recent Results, and Dual Neuro Assessment    Joanne Ruggiero RN                            
9:30 AM    Received call from pharmacy get verbal order from phone  4000 unit  iv bolus and 9 unit/kg/hr . Also lab drawn anti xa before start the drip as per pharmacy .    1655   Verified with the pharmacy heparin drip no bolus and no rate change at this time.      End of Shift Note    Bedside shift change report given to NEHA Tay (oncoming nurse) by John Cantu RN (offgoing nurse).  Report included the following information SBAR, Kardex, Intake/Output, and MAR    Shift worked:  7a-7p     Shift summary and any significant changes:     Pt is alert and orientation. Pain is been controlled pt is been taking nitroglycerin ointment.Have a BM today.pt have Heparin gtt continuously.     Concerns for physician to address:       Zone phone for oncoming shift:          Activity:     Number times ambulated in hallways past shift: 0  Number of times OOB to chair past shift: 0    Cardiac:   Cardiac Monitoring: Yes      Cardiac Rhythm: Sinus rhythm    Access:  Current line(s): PIV     Genitourinary:        Respiratory:   O2 Device: None (Room air)  Chronic home O2 use?: NO  Incentive spirometer at bedside: YES    GI:     Current diet:  Diet NPO Exceptions are: Ice Chips, Sips of Water with Meds, Sips of Clear Liquids  ADULT DIET; Regular; Low Fat/Low Chol/High Fiber/2 gm Na  Passing flatus: YES    Pain Management:   Patient states pain is manageable on current regimen: YES    Skin:  Oren Scale Score: 23  Interventions:      Patient Safety:  Fall Risk: Nursing Judgement-Fall Risk High(Add Comments): No  Fall Risk Interventions  Nursing Judgement-Fall Risk High(Add Comments): No  Toilet Every 2 Hours-In Advance of Need: Yes  Hourly Visual Checks: In bed  Fall Visual Posted: Socks  Room Door Open: Deferred to decrease stimulation  Alarm On: Personal  Patient Moved Closer to Nursing Station: No    Active Consults:   IP CONSULT TO CARDIOLOGY  IP CONSULT TO CARDIAC REHAB    Length of Stay:  Expected LOS: 3  Actual LOS: 
At approx 0630 pt got out of bed to scale and then to bathroom. Pt HR increased to 190-210. Pt helped back chair (unable to get him to bed)    Treated pts sternal incision pain, tried vagal maneuvers, EKG obtained. Pt in A fib with RVR.  Pt has no complaints of pain right now, just some shortness of breath. Called [facility] Hair for orders. Verbal order for 5mg IV metoprolol and Amio bolus were obtained.      Metoprolol 5 mg/IV given, BP remains stable. 150mg Amio bolus given. Rate at 0727 is 144. Pt is resting comfortably in chair at this time.      
Bedside shift change report given to NEHA Reyes (oncoming nurse) by NEHA Martin (offgoing nurse). Report included the following information Nurse Handoff Report, Intake/Output, MAR, Recent Results, and Cardiac Rhythm NSR .     End of Shift Note    Bedside shift change report given to NEHA Feldman (oncoming nurse) by Eric Glover RN (offgoing nurse).  Report included the following information SBAR    Shift worked:  7p-7a     Shift summary and any significant changes:     NPO from MN for LHC, Heparin drip in progress, within therapeutic ranges.      Concerns for physician to address:  na     Zone phone for oncoming shift:   [phone]        Activity:  Level of Assistance: Independent  Number times ambulated in hallways past shift: 0  Number of times OOB to chair past shift: 0    Cardiac:   Cardiac Monitoring: Yes      Cardiac Rhythm: Sinus rhythm    Access:  Current line(s): PIV     Genitourinary:   Urinary Status: Voiding, Bathroom privileges    Respiratory:   O2 Device: None (Room air)  Chronic home O2 use?: NO  Incentive spirometer at bedside: NO    GI:  Last BM (including prior to admit): 04/12/25  Current diet:  Diet NPO Exceptions are: Ice Chips, Sips of Water with Meds, Sips of Clear Liquids  ADULT DIET; Regular; Low Fat/Low Chol/High Fiber/2 gm Na  Passing flatus: YES    Pain Management:   Patient states pain is manageable on current regimen: YES    Skin:  Oren Scale Score: 23  Interventions: Wound Offloading (Prevention Methods): Bed, pressure reduction mattress    Patient Safety:  Fall Risk: Nursing Judgement-Fall Risk High(Add Comments): No  Fall Risk Interventions  Nursing Judgement-Fall Risk High(Add Comments): No  Toilet Every 2 Hours-In Advance of Need: Yes  Hourly Visual Checks: Awake, In bed  Fall Visual Posted: Socks  Room Door Open: Deferred to promote rest  Alarm On: Bed  Patient Moved Closer to Nursing Station: No    Active Consults:   IP CONSULT TO CARDIOLOGY  IP CONSULT TO CARDIAC REHAB    Length of 
Bedside shift change report given to neha Fuentes (oncoming nurse) by neha Dugan (offgoing nurse). Report included the following information Nurse Handoff Report, Intake/Output, MAR, Recent Results, and Cardiac Rhythm nsr .     2324: Heparin dose/rate verified with pharmacist Kareem.     End of Shift Note    Bedside shift change report given to NEHA Martin (oncoming nurse) by Eric Glover RN (offgoing nurse).  Report included the following information SBAR    Shift worked:  7p-7a     Shift summary and any significant changes:     Heparin drip in progress, planned for [facility] on Monday     Concerns for physician to address:  na     Zone phone for oncoming shift:   [phone]       Activity:  Level of Assistance: Independent  Number times ambulated in hallways past shift: 0  Number of times OOB to chair past shift: 0    Cardiac:   Cardiac Monitoring: Yes      Cardiac Rhythm: Sinus rhythm    Access:  Current line(s): PIV     Genitourinary:        Respiratory:   O2 Device: None (Room air)  Chronic home O2 use?: NO  Incentive spirometer at bedside: NO    GI:  Last BM (including prior to admit): 04/12/25  Current diet:  Diet NPO Exceptions are: Ice Chips, Sips of Water with Meds, Sips of Clear Liquids  ADULT DIET; Regular; Low Fat/Low Chol/High Fiber/2 gm Na  Passing flatus: YES    Pain Management:   Patient states pain is manageable on current regimen: YES    Skin:  Oren Scale Score: 23  Interventions: Wound Offloading (Prevention Methods): Bed, pressure reduction mattress, Repositioning    Patient Safety:  Fall Risk: Nursing Judgement-Fall Risk High(Add Comments): No  Fall Risk Interventions  Nursing Judgement-Fall Risk High(Add Comments): No  Toilet Every 2 Hours-In Advance of Need: Yes  Hourly Visual Checks: In bed  Fall Visual Posted: Socks  Room Door Open: Deferred to promote rest  Alarm On: Bed  Patient Moved Closer to Nursing Station: No    Active Consults:   IP CONSULT TO CARDIOLOGY  IP CONSULT TO CARDIAC REHAB    Length of 
CLARIFICATION : per patient he was given contrast from a previous cath and that resulted in a hematoma. There was a question if this was an allergy but patient stated he was told this was  more likely a result of an infiltrated iv. Denies every having hives, difficulty breathing or rash with contrast  
[facility] FLOOR (Pre-op) Progress Note    Admit Date: 2025  CABG eval    Subjective/overnight events:   Pt resting in bed.  NSR, on room air, afebrile. NSR 60s.  -130/80s. No events overnight. No CP or SOB. Jaw is still about the same.     Objective:     /82   Pulse 71   Temp 97.9 °F (36.6 °C)   Resp 19   Ht 1.905 m (6' 3\")   Wt 108.9 kg (240 lb 1.3 oz)   SpO2 96%   BMI 30.01 kg/m²   Temp (24hrs), Av °F (36.7 °C), Min:97.9 °F (36.6 °C), Max:98.1 °F (36.7 °C)    Last 24hr Input/Output:    Intake/Output Summary (Last 24 hours) at 2025 0915  Last data filed at 2025 2330  Gross per 24 hour   Intake 480 ml   Output --   Net 480 ml      Pre-op Workup    Carotid duplex,25 :     No stenosis in the right internal carotid artery.    No stenosis in the left internal carotid artery.    Normal antegrade flow involving the right vertebral artery.    Normal antegrade flow involving the left vertebral artery.    Cardiac cath: 25  CARDIAC PROCEDURE 04/15/2025  7:45 AM (Final)  Conclusion    Diabetic with severe proximal LAD, diffuse mid LAD disease, severe ISR of RCA KATALINA stent, also with subtotalled D1, superior branch of OM1, and OM2 will evaluate for CABG.    LVEDP 5, no AS.    Right dominant system.  70% prox LAD, ruptured plaque, diffuse mid LAD disease up to 50%, subtotalled D1, Patent OM1 stent, superior branch of OM1 subtotalled, OM2 subtotalled, 80% ISR of ostial RCA stent.    5Fr right radial sheath removed via patent hemostasis.    Evaluate for CABG.    Signed by: Bernardino Vargas MD on 4/15/2025  7:45 AM     Echocardiogram: 25  ECHO (TTE) LIMITED (PRN CONTRAST/BUBBLE/STRAIN/3D) 2025 10:59 AM (Final)    Interpretation Summary    Left Ventricle: Normal left ventricular systolic function with a visually estimated EF of 55 - 60%. Left ventricle size is normal. Normal wall thickness. Normal wall motion.    Right Ventricle: Right ventricle size is normal. Normal systolic 
[facility] FLOOR (Pre-op) Progress Note    Admit Date: 2025  POD: 1 Day Post-Op      Procedure:  Procedure(s):  Left heart cath / coronary angiography  Ultrasound guided vascular access      Subjective/overnight events:   Pt seen in bed. In NSR, on room air, afebrile. Vital signs stable. No events overnight. No CP or SOB.    On the following infusions: heparin.  Objective:     /66   Pulse 74   Temp 98.2 °F (36.8 °C) (Oral)   Resp 15   Ht 1.905 m (6' 3\")   Wt 108.4 kg (239 lb)   SpO2 100%   BMI 29.87 kg/m²   Temp (24hrs), Av.1 °F (36.7 °C), Min:97.6 °F (36.4 °C), Max:98.6 °F (37 °C)      Last 24hr Input/Output:    Intake/Output Summary (Last 24 hours) at 4/15/2025 0749  Last data filed at 4/15/2025 0648  Gross per 24 hour   Intake 2372.23 ml   Output 2450 ml   Net -77.77 ml        Pre-op Workup    Carotid duplex,25 :     No stenosis in the right internal carotid artery.    No stenosis in the left internal carotid artery.    Normal antegrade flow involving the right vertebral artery.    Normal antegrade flow involving the left vertebral artery.    Cardiac cath: 25    CARDIAC PROCEDURE 04/15/2025  7:45 AM (Final)    Conclusion    Diabetic with severe proximal LAD, diffuse mid LAD disease, severe ISR of RCA KATALINA stent, also with subtotalled D1, superior branch of OM1, and OM2 will evaluate for CABG.    LVEDP 5, no AS.    Right dominant system.  70% prox LAD, ruptured plaque, diffuse mid LAD disease up to 50%, subtotalled D1, Patent OM1 stent, superior branch of OM1 subtotalled, OM2 subtotalled, 80% ISR of ostial RCA stent.    5Fr right radial sheath removed via patent hemostasis.    Evaluate for CABG.    Signed by: Bernardino Vargas MD on 4/15/2025  7:45 AM       Echocardiogram: 25    ECHO (TTE) LIMITED (PRN CONTRAST/BUBBLE/STRAIN/3D) 2025 10:59 AM (Final)    Interpretation Summary    Left Ventricle: Normal left ventricular systolic function with a visually estimated EF of 55 - 60%. 
[facility] FLOOR (Pre-op) Progress Note    Admit Date: 2025  POD: 2 Days Post-Op      Procedure:  Procedure(s):  Left heart cath / coronary angiography  Ultrasound guided vascular access      Subjective/overnight events:   Pt discussed with Dr. Johnson, seen in bed. In NSR, on room air, afebrile. Vital signs stable. No events overnight. No CP or SOB. Jaw is about the same from yesterday.     Objective:     /66   Pulse 64   Temp 98.4 °F (36.9 °C)   Resp 14   Ht 1.905 m (6' 3\")   Wt 108.5 kg (239 lb 3.2 oz)   SpO2 98%   BMI 29.90 kg/m²   Temp (24hrs), Av.9 °F (36.6 °C), Min:97.6 °F (36.4 °C), Max:98.4 °F (36.9 °C)      Last 24hr Input/Output:    Intake/Output Summary (Last 24 hours) at 2025 0814  Last data filed at 4/15/2025 1427  Gross per 24 hour   Intake 350 ml   Output 1050 ml   Net -700 ml        Pre-op Workup    Carotid duplex,25 :     No stenosis in the right internal carotid artery.    No stenosis in the left internal carotid artery.    Normal antegrade flow involving the right vertebral artery.    Normal antegrade flow involving the left vertebral artery.    Cardiac cath: 25    CARDIAC PROCEDURE 04/15/2025  7:45 AM (Final)    Conclusion    Diabetic with severe proximal LAD, diffuse mid LAD disease, severe ISR of RCA KATALINA stent, also with subtotalled D1, superior branch of OM1, and OM2 will evaluate for CABG.    LVEDP 5, no AS.    Right dominant system.  70% prox LAD, ruptured plaque, diffuse mid LAD disease up to 50%, subtotalled D1, Patent OM1 stent, superior branch of OM1 subtotalled, OM2 subtotalled, 80% ISR of ostial RCA stent.    5Fr right radial sheath removed via patent hemostasis.    Evaluate for CABG.    Signed by: Bernardino Vargas MD on 4/15/2025  7:45 AM       Echocardiogram: 25    ECHO (TTE) LIMITED (PRN CONTRAST/BUBBLE/STRAIN/3D) 2025 10:59 AM (Final)    Interpretation Summary    Left Ventricle: Normal left ventricular systolic function with a visually 
CSS FLOOR Progress Note    Admit Date: 2025  POD: 2 Days Post-Op      Procedure:    25 Dr. Johnson: ON-PUMP CORONARY ARTERY BYPASS GRAFTING TIMES TWO WITH LEFT INTERNAL MAMMARY ARTERY, ENDOSCOPIC HARVESTING OF THE RIGHT GREATER SAPHENOUS VEIN, AND LEFT ATRIAL APPENDAGE LIGATION (E.R.A.S.). LATOYA BY DR. HAUSER.    Subjective:   Pt seen with Dr. Johnson  Room air  ST  Urinary retention.     Pt states he will  not smoke again and will make life style changes.     Objective:     BP (!) 146/82   Pulse (!) 130   Temp 98.6 °F (37 °C) (Oral)   Resp (!) 32   Ht 1.905 m (6' 3\")   Wt 111.9 kg (246 lb 9.6 oz)   SpO2 100%   BMI 30.82 kg/m²   Temp (24hrs), Av.8 °F (36.6 °C), Min:96.7 °F (35.9 °C), Max:98.6 °F (37 °C)      Last 24hr Input/Output:    Intake/Output Summary (Last 24 hours) at 2025 0924  Last data filed at 2025 0921  Gross per 24 hour   Intake 798.81 ml   Output 1770 ml   Net -971.19 ml        EKG/Rhythm: ST      Oxygen: RA    CXR:      Admission Weight: Last Weight   Weight - Scale: 108.7 kg (239 lb 10.2 oz) Weight - Scale: 111.9 kg (246 lb 9.6 oz)       EXAM:  General: WDWN man in NAD sitting in the chair.    Lungs:   Diminished in the bases. Unlabored  on room air. Chest tubes removed.    Incision:  No erythema, drainage or swelling.  Prineo intact.    Heart:  Tachycardic rate and regular rhythm, S1, S2 normal, no murmur, click, rub or gallop.   Abdomen:   Soft, non-tender. Bowel sounds hypoactive. No masses,  No organomegaly.  No flatus. Tiny BM.    Extremities:  Trace  edema. PPP   Neurologic:  Gross motor and sensory apparatus intact.     Activity:up with assist     Diet:  carb controlled heart healthy    Lab Data Reviewed:   Recent Labs     25  1548 25  0328 25  0616   WBC 21.1*   < > 19.0* 29.6*   HGB 10.3*   < > 10.9* 11.0*   HCT 31.5*   < > 32.4* 33.8*      < > 196 213      < > 139  --    K 3.9   < > 4.6  --    BUN 6   < > 7  --  
CSS FLOOR Progress Note    Admit Date: 2025  POD: 3 Days Post-Op      Procedure:  Procedure(s):  ON-PUMP CORONARY ARTERY BYPASS GRAFTING TIMES TWO WITH LEFT INTERNAL MAMMARY ARTERY, ENDOSCOPIC HARVESTING OF THE RIGHT GREATER SAPHENOUS VEIN, AND LEFT ATRIAL APPENDAGE LIGATION (E.R.A.S.). LATOYA BY DR. HAUSER.      Subjective/interval/overnight events:   Pt seen with Dr. Pineda, up in the chair. In ST, on room air, afebrile. No events overnight. Jaw is bothering him more than his sternal incision this AM.    Remains on the following infusions: insulin.  Objective:     /85   Pulse (!) 104   Temp 98.8 °F (37.1 °C) (Oral)   Resp 16   Ht 1.905 m (6' 3\")   Wt 111.9 kg (246 lb 9.6 oz)   SpO2 95%   BMI 30.82 kg/m²   Temp (24hrs), Av.6 °F (37 °C), Min:97.9 °F (36.6 °C), Max:98.9 °F (37.2 °C)      Last 24hr Input/Output:    Intake/Output Summary (Last 24 hours) at 2025 0838  Last data filed at 2025 0400  Gross per 24 hour   Intake 937.38 ml   Output 1780 ml   Net -842.62 ml        Chest Tube Output: N/A    EKG/Rhythm:   Encounter Date: 25   EKG 12 lead   Result Value    Ventricular Rate 80    Atrial Rate 80    P-R Interval 170    QRS Duration 98    Q-T Interval 404    QTc Calculation (Bazett) 465    P Axis 53    R Axis 4    T Axis 18    Diagnosis      Normal sinus rhythm  Early repolarization    Confirmed by Bhargavi Acosta M.D. ([id number]) on 2025 12:26:27 PM         Oxygen: As above    CXR: Xray Result (most recent):  XR CHEST PORTABLE 2025    Narrative  INDICATION: Post op open heart surgery    COMPARISON: 2025    FINDINGS: Single AP portable view of the chest demonstrates no pneumothorax  following removal of right IJ sheath. Pleural mediastinal drains are unchanged.  The cardiomediastinal silhouette is unchanged. Bibasilar atelectasis and small  right pleural effusion. No pulmonary edema.    Impression  No pneumothorax following removal of right IJ sheath, with unchanged 
[facility] End of Shift Note    Bedside shift change report given to Manuel SOLOMON (oncoming nurse) by Jennifer SOLOMON / Keith Anne RN (offgoing nurse).  Report included the following information SBAR, Intake/Output, MAR, Accordion, and Cardiac Rhythm NSR/ST    Shift worked:  7p-7a     Shift summary and any significant changes:    ST when up.     Concerns for physician to address:  N/A       Activity:  Level of Assistance: Minimal assist, patient does 75% or more  Number times ambulated in hallways past shift: 0  Number of times OOB to chair past shift: 3    Neurologic:  Level of Consciousness: Alert (0)  Orientation Level: Oriented X4  Cognition: Follows commands, Appropriate judgement, Appropriate attention/concentration, Appropriate for developmental age, Appropriate safety awareness  Speech: Clear    Cardiac:   Cardiac Rhythm: Sinus rhythm    Pacer Wires: Pacer Wires: Removed       Respiratory:   O2 Device: None (Room air)  ISS Teaching YES   Use : YES         Genitourinary:   Urinary Status: Voiding, Bathroom privileges    GI:  Last BM (including prior to admit): 04/26/25  Current diet:  ADULT DIET; Regular; 3 carb choices (45 gm/meal); Low Fat/Low Chol/High Fiber/2 gm Na; No Concentrated sweets  ADULT ORAL NUTRITION SUPPLEMENT; Breakfast, Dinner; Diabetic Oral Supplement  Passing flatus: YES    Lines/drains/airway:  Peripheral IV    Peripheral IV x 2: YES    Skin:    Wound Care Documentation:  Puncture 04/14/25 Radial (Active)   Site Assessment Clean, dry, and intact 04/25/25 1600   Closure Discontinued 04/15/25 1500   Amount of Air Infused (mL) 5 mL 04/14/25 1113   Amount of Air Released (mL) 3 mL 04/14/25 1113   Estimated Amount of Air Remaining (mL) 2 mL 04/14/25 1113   Drainage Amount None 04/19/25 0710   Dressing/Treatment Transparent occluusive 04/21/25 0740   Dressing Changed Changed/New 04/25/25 1200   Number of days: 11          Oren Scale Score: 23  Interventions: Wound Offloading (Prevention Methods): Bed, 
Cardiac Surgery Care Coordinator met with Michael Fournier. Introduced role of the Cardiac Surgery Care Coordinator.  Reviewed plan of care and began pre-op education.  Discussed day of surgery expectations for the pt and family.  Instructed pt on the proper use of the incentive spirometer. He is able to pull 2250ml.  Provided and reviewed material in the ERAS educational binder including Cardiac Surgery Pathway. Reinforced sternal precautions and keeping your move in the tube. Encouraged Michael Fournier to verbalize and offered emotional support.  Arline Lockwood RN    
Cardiac Surgery Care Coordinator met with Michael Fournier. Reviewed plan of care and discussed goals for the day. Michael Fournier has a good understanding of his plan for the day.  Reinforced sternal precautions and encouraged continued use of the incentive spirometer.  Michael Fournier can pull 250ml. Encouraged Michael Fournier to verbalize.   Will continue to follow for educational and emotional needs.  Arline Lockwood RN    
Cardiac Surgery Care Coordinator- Met with Michael Fournier.  Reviewed plan of care and discussed upcoming discharge date.  Reinforced sternal precautions and encouraged continued use of the incentive spirometer. Began discharge teaching and encouraged him to verbalize.  Reviewed goals for the day and discussed the  importance of increased activity and continued activity after discharge.  Will continue to follow for educational and emotional needs. Arline Lockwood RN    
Cardiac Surgery Care Coordinator- Met with Michael Fournier. Reviewed plan of care and discussed potential discharge date.  Reinforced sternal precautions and encouraged continued use of the incentive spirometer. Reviewed goals for the day and emphasized the importance of increased activity to meet discharge goals.  Will continue to follow for educational and emotional needs. Arline Lockwood RN    
Cardiac Surgery Coordinator met with the family of Michael Fournier, introduced role of the Cardiac Surgery Care Coordinator.  Reviewed plan of care and day of surgery expectations. Provided family with an update from OR.  Encouraged family to verbalize and emotional support given.  Will continue to update throughout the day.    1305 - Met with family of Michael Fournier, provided family with update of on by-pass.  Family without questions or concerns at this time.  Will continue to follow for educational and emotional needs.     1335 - Met with family of Michael Fournier, provided family with update of rewarming.  Family without questions or concerns at this time.  Will continue to follow for educational and emotional needs.     1405 - Met with family of Michael Fournier, provided family with update of off by-pass.  Family without questions or concerns at this time.  Will continue to follow for educational and emotional needs.     
Cardiac Surgery ICU Progress Note    Admit Date: 2025  POD:  1 Day Post-Op    Procedure:  Procedure(s):  25 Dr. Johnson: ON-PUMP CORONARY ARTERY BYPASS GRAFTING TIMES TWO WITH LEFT INTERNAL MAMMARY ARTERY, ENDOSCOPIC HARVESTING OF THE RIGHT GREATER SAPHENOUS VEIN, AND LEFT ATRIAL APPENDAGE LIGATION (E.R.A.S.). LATOYA BY DR. HAUSER.       25 I&D of left submandibular abscess by Dr. Morrissey     Subjective:   Patient seen with Dr. RICE\"Umair  2LNC  SR  No vasoactive infusions.   Insulin infusion.    Pt denies pain at left submandibular surgical site.   Pt requested a nicotine patch to assist with smoking cessation.      Objective:   Vitals:  Blood pressure 132/77, pulse 80, temperature 97.5 °F (36.4 °C), temperature source Bladder, resp. rate 17, height 1.905 m (6' 3\"), weight 112.7 kg (248 lb 7.3 oz), SpO2 99%.  Temp (24hrs), Av.4 °F (35.8 °C), Min:95.5 °F (35.3 °C), Max:98.7 °F (37.1 °C)        Ventilator Settings:      Mode Rate TV Press PEEP FiO2 PIP Min. Vent   CPAP 16 bpm  510 mL    5 40 %  23 cmH2O           Oxygen Flow Rate: O2 Flow Rate (L/min): 2 L/min    Oxygen Delivery Method: O2 Device: None (Room air)      [place]  [place]  CVP (Mean): 7 mmHg       EKG/Rhythm:  SR with ST elevations    Extubation Date / Time:     CT Output: 680 cc since OR; 410 cc overnight    CXR:  Portable chest shows interval extubation and NG tube removal with  otherwise stable support lines since yesterday. There is no apparent  pneumothorax.  Lungs show mild bibasilar haziness. Heart size is stable. There  is no overt pulmonary edema.     IMPRESSION:  No significant change.       Admission Weight: Last Weight   Weight - Scale: 108.7 kg (239 lb 10.2 oz) Weight - Scale: 112.7 kg (248 lb 7.3 oz)     Intake / Output / Drain:  Current Shift: No intake/output data recorded.  Last 24 hrs.:   Intake/Output Summary (Last 24 hours) at 2025 0811  Last data filed at 2025 0700  Gross per 24 hour   Intake 3889.22 ml 
Comprehensive Nutrition Assessment    Type and Reason for Visit:  LOS    Nutrition Recommendations/Plan:   Continue current diet  Monitor and record PO intakes and Bms in I/Os     Malnutrition Assessment:  Malnutrition Status:  No malnutrition (04/18/25 1428)    Context:  Acute Illness     Findings of the 6 clinical characteristics of malnutrition:  Energy Intake:  No decrease in energy intake  Weight Loss:  No weight loss     Body Fat Loss:  Unable to assess     Muscle Mass Loss:  Unable to assess    Fluid Accumulation:  No fluid accumulation     Strength:  Not Performed    Nutrition Assessment:    Admitted for unstable angina. Plan for CABG 4/21. PMHx includes DM, gall stone, HTN, melanoma. Screened for LOS. Pt with good intakes, >50%. No recent significant weight loss. Plan to continue diet and ERAS ONS.    Nutrition Related Findings:    Labs: Na 135, K 3.6, BUN 5, Creat 0.69, Gluc 243, Mag 1.7, Phos 3.0. Meds: atorvastatin, docusate sodium, insulin glargine, insulin lispro, nicotine. No edema. BM 4/17. Wound Type: Surgical Incision       Current Nutrition Intake & Therapies:    Average Meal Intake: 51-75%  Average Supplements Intake: Unable to assess  ADULT DIET; Regular; Low Fat/Low Chol/High Fiber/2 gm Na  ADULT ORAL NUTRITION SUPPLEMENT; Breakfast, Lunch, Dinner; Low Calorie/High Protein Oral Supplement    Anthropometric Measures:  Height: 190.5 cm (6' 3\")  Ideal Body Weight (IBW): 196 lbs (89 kg)    Current Body Weight: 108.9 kg (240 lb 1.3 oz), 122.5 % IBW. Weight Source: Standing scale  Current BMI (kg/m2): 30  BMI Categories: Obese Class 1 (BMI 30.0-34.9)    Estimated Daily Nutrient Needs:  Energy Requirements Based On: Kcal/kg  Weight Used for Energy Requirements: Adjusted  Energy (kcal/day): 2353kcal (25kcal/kg)  Weight Used for Protein Requirements: Adjusted  Protein (g/day): 94-113g (1-1.2g/kg)  Method Used for Fluid Requirements: 1 ml/kcal  Fluid (ml/day): 2353mL    Nutrition Diagnosis:   No 
Comprehensive Nutrition Assessment    Type and Reason for Visit:  Reassess    Nutrition Recommendations/Plan:   Continue diet and supplements as ordered, could consider increasing to 4 CHO choices   Please document % meals and supplements consumed in flowsheet I/O's under intake   Continue PO supplements      Malnutrition Assessment:  Malnutrition Status:  No malnutrition (04/18/25 1428)    Context:  Acute Illness     Findings of the 6 clinical characteristics of malnutrition:  Energy Intake:  No decrease in energy intake  Weight Loss:  No weight loss     Body Fat Loss:  Unable to assess     Muscle Mass Loss:  Unable to assess    Fluid Accumulation:  No fluid accumulation     Strength:  Not Performed    Nutrition Assessment:  Chart reviewed, case discussed during CCU rounds.  Pt is POD# 3 CABG x 2.  His appetite was great preop, varied postop likely 2' constipation and postop nausea.  He did have a BM yesterday and today.  -154.  He is consuming 100% of his supplements.  Would continue to encourage PO intake.  As he is quite tall and has high needs, would consider increasing to 4 CHO choices per meal.  Will monitor BG off insulin drip and make an adjustment tomorrow.   Patient Vitals for the past 120 hrs:   PO Meals Eaten (%)   04/24/25 1112 1 - 25%   04/23/25 1800 0%   04/23/25 1430 51 - 75%   04/23/25 1000 1 - 25%   04/22/25 0930 0%   04/20/25 2256 76 - 100%   04/20/25 1758 76 - 100%   04/20/25 1402 51 - 75%   04/20/25 0800 76 - 100%     Patient Vitals for the past 120 hrs:   PO Supplement (%)   04/24/25 1112 0%   04/23/25 1800 0%   04/23/25 1000 76 - 100%   04/22/25 1748 76 - 100%   04/22/25 0930 76 - 100%           Nutrition Related Findings:    Meds: ancef, pepcid, lantus, lispro, magox, flagyl, glycolax, KCl, senokot.    BM: 4/24   Wound Type: Surgical Incision       Current Nutrition Intake & Therapies:    Average Meal Intake: 26-50%, 51-75%, 1-25%, % (varied)  Average Supplements Intake: 
End of Shift Note    Bedside shift change report given to NEHA Shetty (oncoming nurse) by John Cantu RN (offgoing nurse).  Report included the following information SBAR, Kardex, Intake/Output, and MAR    Shift worked:  7a-7p     Shift summary and any significant changes:     Pt is alert orientation. Pt just arrived to the floor.     Concerns for physician to address:       Zone phone for oncoming shift:          Activity:     Number times ambulated in hallways past shift: 0  Number of times OOB to chair past shift: 0    Cardiac:   Cardiac Monitoring: Yes           Access:  Current line(s): PIV     Genitourinary:        Respiratory:      Chronic home O2 use?: NO  Incentive spirometer at bedside: YES    GI:     Current diet:  No diet orders on file  Passing flatus: YES    Pain Management:   Patient states pain is manageable on current regimen: YES    Skin:     Interventions:      Patient Safety:  Fall Risk:         Active Consults:   None    Length of Stay:  Expected LOS:   Actual LOS: 0    John Cantu RN                           
End of Shift Note    Bedside shift change report given to Sandee (oncoming nurse) by Christina Gonzalez RN (offgoing nurse).  Report included the following information Nurse Handoff Report and Index    Shift worked:  7a-7p     Shift summary and any significant changes:     Patient started on insulin gtt today. Denies any CP. Plan for CABG in AM      Concerns for physician to address:  None      Zone phone for oncoming shift:   N/A        Activity:  Activity: In bed  Number times ambulated in hallways past shift: 2  Number of times OOB to chair past shift: 2    Cardiac:   Cardiac Monitoring: Yes      Cardiac Rhythm: Sinus rhythm    Access:  Current line(s): PIV     Genitourinary:   Urinary status: voiding    Respiratory:   O2 Device: None (Room air)  Chronic home O2 use?: NO  Incentive spirometer at bedside: YES       GI:  Last BM (including prior to admit): 04/20/25  Current diet:    ADULT ORAL NUTRITION SUPPLEMENT; Breakfast, Lunch, Dinner; Low Calorie/High Protein Oral Supplement  Diet NPO Exceptions are: Sips of Clear Liquids  ADULT DIET; Regular; 3 carb choices (45 gm/meal); Low Fat/Low Chol/High Fiber/2 gm Na  DIET ONE TIME MESSAGE;  Passing flatus: YES  Tolerating current diet: YES       Pain Management:   Patient states pain is manageable on current regimen: YES    Skin:  Oren Scale Score: 22  Interventions: float heels    Patient Safety:  Fall Score: Frazier Total Score: 20  Interventions: bed/chair alarm, gripper socks, pt to call before getting OOB, and stay with me (per policy)       Length of Stay:  Expected LOS: 13  Actual LOS: 9      Christina Gonzalez, RN    
End of Shift Note    Bedside shift change report given to Tasha SOLOMON (oncoming nurse) by Arpan Whitney RN (offgoing nurse).  Report included the following information SBAR, Kardex, ED Summary, Procedure Summary, Intake/Output, MAR, Recent Results, Med Rec Status, and Cardiac Rhythm SR    Shift worked:  11p-7p     Shift summary and any significant changes:     Post cath. R radial c/d/I. Oob and ambulating. Voiding. Denies pain. Cabg work up. Anxious. Got ativan po     Concerns for physician to address:  na     Zone phone for oncoming shift:   na       Activity:  Level of Assistance: Independent  Number times ambulated in hallways past shift: 2  Number of times OOB to chair past shift: 4    Cardiac:   Cardiac Monitoring: Yes      Cardiac Rhythm: Sinus rhythm    Access:  Current line(s): PIV     Genitourinary:   Urinary Status: Voiding    Respiratory:   O2 Device: None (Room air)  Chronic home O2 use?: NO  Incentive spirometer at bedside: NO    GI:  Last BM (including prior to admit): 04/12/25  Current diet:  ADULT DIET; Regular; Low Fat/Low Chol/High Fiber/2 gm Na  ADULT ORAL NUTRITION SUPPLEMENT; Breakfast, Lunch, Dinner; Low Calorie/High Protein Oral Supplement  Passing flatus: YES    Pain Management:   Patient states pain is manageable on current regimen: YES    Skin:  Oren Scale Score: 23  Interventions: Wound Offloading (Prevention Methods): Elevate heels, Pillows, Repositioning    Patient Safety:  Fall Risk: Nursing Judgement-Fall Risk High(Add Comments): No  Fall Risk Interventions  Nursing Judgement-Fall Risk High(Add Comments): No  Toilet Every 2 Hours-In Advance of Need: Yes  Hourly Visual Checks: Awake, In bed  Fall Visual Posted: Socks  Room Door Open: Yes  Alarm On: Bed  Patient Moved Closer to Nursing Station: No    Active Consults:   IP CONSULT TO CARDIOLOGY  IP CONSULT TO CARDIAC REHAB  IP CONSULT TO DIABETES MANAGEMENT  IP CONSULT TO GENERAL SURGERY    Length of Stay:  Expected LOS: 5  Actual 
Looks good today - will discharge home   
Occupational Therapy note:    Order acknowledged and chart reviewed. Patient s/p CABG POD 0. Will defer OT consult and follow up POD 1 per guidelines.    Delmi Reddy, OTR/L     
Predictive Model Details          17 (Normal)  Factor Value    Calculated 4/17/2025 23:42 56% Age 45 years old    Deterioration Index Model 19% Respiratory rate 18     9% Sodium 136 mmol/L     7% Potassium abnormal (3.3 mmol/L)     5% WBC count 8.9 K/uL     1% Systolic 115     1% Pulse 66     1% Pulse oximetry 98 %     0% Temperature 98 °F (36.7 °C)     0% Hematocrit 37.7 %         End of Shift Note    Bedside shift change report given to  (oncoming nurse) by KEYONNA ROWLEY RN (offgoing nurse).  Report included the following information Kardex, Intake/Output, MAR, Recent Results, and Cardiac Rhythm nsr    Shift worked:  7p     Shift summary and any significant changes:     Heparing gtt infusing at 13 units     Concerns for physician to address: Patient would like PRN ativan for night time.      Zone phone for oncoming shift:   [phone]       Activity:  Level of Assistance: Independent  Number times ambulated in hallways past shift: 0  Number of times OOB to chair past shift: 0    Cardiac:   Cardiac Monitoring: Yes      Cardiac Rhythm: Sinus rhythm    Access:  Current line(s): PIV     Genitourinary:   Urinary Status: Voiding    Respiratory:   O2 Device: None (Room air)  Chronic home O2 use?: NO  Incentive spirometer at bedside: YES    GI:  Last BM (including prior to admit): 04/17/25  Current diet:  ADULT DIET; Regular; Low Fat/Low Chol/High Fiber/2 gm Na  ADULT ORAL NUTRITION SUPPLEMENT; Breakfast, Lunch, Dinner; Low Calorie/High Protein Oral Supplement  Passing flatus: YES    Pain Management:   Patient states pain is manageable on current regimen: N/A    Skin:  Oren Scale Score: 22  Interventions: Wound Offloading (Prevention Methods): Bed, pressure redistribution/air, Bed, pressure reduction mattress, Elevate heels, Pillows, Repositioning    Patient Safety:  Fall Risk: Nursing Judgement-Fall Risk High(Add Comments): No  Fall Risk Interventions  Nursing Judgement-Fall Risk High(Add Comments): No  Toilet Every 2 
Predictive Model Details          18 (Normal)  Factor Value    Calculated 4/19/2025 02:27 50% Age 45 years old    Deterioration Index Model 16% Respiratory rate 18     8% WBC count 9.8 K/uL     7% Pulse 94     6% Systolic 126     6% Sodium 137 mmol/L     5% Potassium 3.5 mmol/L     1% Pulse oximetry 98 %     0% Temperature 98.1 °F (36.7 °C)     0% Hematocrit 38.6 %         End of Shift Note    Bedside shift change report given to  (oncoming nurse) by KEYONNA ROWLEY RN (offgoing nurse).  Report included the following information Kardex, Intake/Output, MAR, Recent Results, and Cardiac Rhythm nsr    Shift worked:  7p     Shift summary and any significant changes:     Heparing gtt infusing at 13 units     Concerns for physician to address: Patient would like PRN ativan for night time.   Patient would like to shower     FACIAL ABSCESS HAS BECOME SWOLLEN AGAIN.       Zone phone for oncoming shift:   [phone]       Activity:  Level of Assistance: Independent  Number times ambulated in hallways past shift: 0  Number of times OOB to chair past shift: 0    Cardiac:   Cardiac Monitoring: Yes      Cardiac Rhythm: Sinus rhythm    Access:  Current line(s): PIV     Genitourinary:   Urinary Status: Voiding    Respiratory:   O2 Device: None (Room air)  Chronic home O2 use?: NO  Incentive spirometer at bedside: YES    GI:  Last BM (including prior to admit): 04/17/25  Current diet:  ADULT ORAL NUTRITION SUPPLEMENT; Breakfast, Lunch, Dinner; Low Calorie/High Protein Oral Supplement  Diet NPO Exceptions are: Sips of Clear Liquids  ADULT DIET; Regular; 3 carb choices (45 gm/meal); Low Fat/Low Chol/High Fiber/2 gm Na  Passing flatus: YES    Pain Management:   Patient states pain is manageable on current regimen: N/A    Skin:  Oren Scale Score: 23  Interventions: Wound Offloading (Prevention Methods): Bed, pressure redistribution/air, Bed, pressure reduction mattress, Elevate heels, Pillows, Repositioning    Patient Safety:  Fall Risk: 
Predictive Model Details          18 (Normal)  Factor Value    Calculated 4/19/2025 21:36 48% Age 45 years old    Deterioration Index Model 16% Respiratory rate 18     13% Systolic 144     8% WBC count 9.8 K/uL     6% Sodium 137 mmol/L     5% Potassium 3.5 mmol/L     4% Pulse oximetry 99 %     1% Temperature 97.8 °F (36.6 °C)     0% Hematocrit 38.6 %     0% Pulse 74         End of Shift Note    Bedside shift change report given to  (oncoming nurse) by KEYONNA ROWLEY RN (offgoing nurse).  Report included the following information Kardex, Intake/Output, MAR, Recent Results, and Cardiac Rhythm nsr    Shift worked:  7p     Shift summary and any significant changes:     Heparing gtt infusing at 13 units     Concerns for physician to address:        Zone phone for oncoming shift:   [phone]       Activity:  Level of Assistance: Independent  Number times ambulated in hallways past shift: 0  Number of times OOB to chair past shift: 0    Cardiac:   Cardiac Monitoring: Yes      Cardiac Rhythm: Sinus rhythm    Access:  Current line(s): PIV     Genitourinary:   Urinary Status: Voiding    Respiratory:   O2 Device: None (Room air)  Chronic home O2 use?: NO  Incentive spirometer at bedside: YES    GI:  Last BM (including prior to admit): 04/17/25  Current diet:  ADULT ORAL NUTRITION SUPPLEMENT; Breakfast, Lunch, Dinner; Low Calorie/High Protein Oral Supplement  Diet NPO Exceptions are: Sips of Clear Liquids  ADULT DIET; Regular; 3 carb choices (45 gm/meal); Low Fat/Low Chol/High Fiber/2 gm Na  DIET ONE TIME MESSAGE;  Passing flatus: YES    Pain Management:   Patient states pain is manageable on current regimen: N/A    Skin:  Oren Scale Score: 23  Interventions: Wound Offloading (Prevention Methods): Bed, pressure redistribution/air, Bed, pressure reduction mattress, Elevate heels, Pillows, Repositioning    Patient Safety:  Fall Risk: Nursing Judgement-Fall Risk High(Add Comments): No  Fall Risk Interventions  Nursing 
[facility] FLOOR Progress Note    Admit Date: 2025  POD: 5 Days Post-Op      Procedure:  [unfilled]    Subjective:     Issues with A-fib overnight; gtt @ 1; will drop to 0.5 now and then off at noon; otherwise looks good for discharge     Objective:     Blood pressure (!) 124/93, pulse 90, temperature 99.1 °F (37.3 °C), temperature source Oral, resp. rate 24, height 1.905 m (6' 3\"), weight 112.3 kg (247 lb 9.2 oz), SpO2 99%.  Temp (24hrs), Av.9 °F (37.2 °C), Min:98.4 °F (36.9 °C), Max:99.3 °F (37.4 °C)        Oxygen:    CXR    Medications reviewed    Admission Weight: Last Weight   Weight - Scale: 108.7 kg (239 lb 10.2 oz) Weight - Scale: 112.3 kg (247 lb 9.2 oz)     Intake / Output / Drain:  Current Shift: No intake/output data recorded.  Last 24 hrs.:  1901 -  0700  In: 2411 [P.O.:680; I.V.:1057.3]  Out: 1800 [Urine:1800]    EXAM:  BP (!) 124/93   Pulse 90   Temp 99.1 °F (37.3 °C) (Oral)   Resp 24   Ht 1.905 m (6' 3\")   Wt 112.3 kg (247 lb 9.2 oz)   SpO2 99%   BMI 30.94 kg/m²     Labs:  Recent Results (from the past 24 hours)   POCT Glucose    Collection Time: 25  9:52 AM   Result Value Ref Range    POC Glucose 162 (H) 65 - 117 mg/dL    Performed by: Rahul Ramirez RN (Lucy)    POCT Glucose    Collection Time: 25  1:42 PM   Result Value Ref Range    POC Glucose 192 (H) 65 - 117 mg/dL    Performed by: Brigitte Dumont RN    POCT Glucose    Collection Time: 25  5:02 PM   Result Value Ref Range    POC Glucose 166 (H) 65 - 117 mg/dL    Performed by: Brigitte Dumont RN    POCT Glucose    Collection Time: 25  8:32 PM   Result Value Ref Range    POC Glucose 170 (H) 65 - 117 mg/dL    Performed by: Omid Parker RN    Basic Metabolic Panel    Collection Time: 25  1:58 AM   Result Value Ref Range    Sodium 135 (L) 136 - 145 mmol/L    Potassium 3.7 3.5 - 5.1 mmol/L    Chloride 103 97 - 108 mmol/L    CO2 25 21 - 32 mmol/L    Anion Gap 7 2 - 12 mmol/L    Glucose 149 (H) 
Spiritual Care Partner Volunteer visited patient at [facility] in [facility] 2 INTRVNT[facility] CARDIO on 4/18/2025   Documented by: Chaplain Nate Pavon M.Div., [facility].   Paging Service: [phone])  
Spoke with Dr. Molina about meal coverage. Patient on started on insulin gtt but not changed to carb coverage. Change to carb coverage and D/C his other coverage with meals. Dr. Mendel at bedside changed orders. Pharmacy needed to change to have carb coverage.   
5    Arpan Whitney RN                           
awake, alert, and oriented   Lungs:    diminished breath sounds bilaterally   Incision:  Mid-sternal incision clean, dry and intact and no erythema, drainage or swelling.  Left mandibular/neck wound healing well without erythema, swelling or drainage   Heart:   Tachycardic rate and regular rhythm; no murmurs, rubs or gallops   Abdomen:    soft, not-distended, non-tender and active bowel sounds   Extremities:  Mild edema upper and lower extremities   Neurologic:  Gross motor and sensory apparatus intact       Lab Data Reviewed:   Recent Labs     04/25/25  0628   WBC 23.4*   HGB 9.2*   HCT 27.9*         K 4.0   BUN 15       Assessment:     Patient Active Problem List   Diagnosis    Acute pancreatitis    Acute coronary syndrome ([facility])    Acute chest pain    Coronary artery disease of native artery of native heart with stable angina pectoris    Dyslipidemia (high LDL; low HDL)    Erectile dysfunction due to diseases classified elsewhere    Essential hypertension, malignant    Hyperlipidemia    S/P angioplasty with stent    STEMI (ST elevation myocardial infarction) ([facility])    Unstable angina ([facility])    Bilateral carotid artery stenosis    Preop cardiovascular exam    Type 2 diabetes mellitus with hyperglycemia, with long-term current use of insulin ([facility])    S/P CABG x 2    S/P left atrial appendage ligation    BMI 30.0-30.9,adult    Hemoglobin A1C greater than 9%, indicating poor diabetic control    Stress hyperglycemia       Plan/Recommendations/Medical Decision Making:     NSTEMI, MVCAD s/p prior stents s/p CABG, LAAL:   Continue ASA, statin, BB. Consider starting platelet inhibitor prior to discharge- was on Brilinta PTA.   Continue amio.  Increase metoprolol to 50mg BID today given continued tachycardia and afib with RVR overnight.  Albumin x1 this AM; will reassess for diuresis this afternoon pending progress.  Chest tubes discontinued 4/24/25.  Will discontinue pacing wires later today vs tomorrow pending 
malignancy, a   well-differentiated squamous cell carcinoma cannot be excluded   definitively on aspirates alone.     - consulted [facility], appreciate assistance. No evidence of malignancy on FNA  - outpatient ENT referral. Should NOT delay CABG Monday    Anxiety  Anxious about possible CABG Monday  - prn qhs qtivan    Leukocytosis - improved  No signs for infection no fever  Unlikely above folliculitis causing this leukocytosis  blood culture neg  Patient also receive dose of prednisone for allergy contributing to this leukocytosis     Insulin-dependent diabetes mellitus  Decrease PTA glargine to 30 units nightly  -Titrate as indicated  Corrective coverage insulin  Accu-Cheks  Diabetic diet  Hypoglycemia protocol in place     Tobacco use  3 minutes tobacco cessation counseling provided with emphasis on adverse cardiovascular effects of ongoing tobacco use.  Patient reports only smoking 1 black and mild on occasion-strongly encouraged total cessation.      Medical Decision Making:   I personally reviewed labs: CBC, BMP, pathology  I personally reviewed imaging:   I personally reviewed EKG:   Toxic drug monitoring: monitor anti-xa levels for heparin toxicity  Discussed case with: RN         Code Status: Full code  DVT Prophylaxis: Heparin drip  GI Prophylaxis:    Subjective:     Chief Complaint / Reason for Physician Visit  \"Admitted for ACS workup\".  Discussed with RN events overnight. States the abscess on his L neck feels like it is getting bigger.      Objective:     VITALS:   Last 24hrs VS reviewed since prior progress note. Most recent are:  Patient Vitals for the past 24 hrs:   BP Temp Temp src Pulse Resp SpO2 Weight   04/19/25 1524 (!) 144/81 97.8 °F (36.6 °C) Oral 74 18 -- --   04/19/25 1145 -- 97.8 °F (36.6 °C) -- -- -- -- --   04/19/25 0710 -- 98 °F (36.7 °C) -- -- 19 99 % --   04/19/25 0620 120/76 98 °F (36.7 °C) Oral 78 18 -- --   04/19/25 0600 -- -- -- -- -- -- 108.9 kg (240 lb 1.3 oz)   04/18/25 2245 -- 
prior progress note. Most recent are:  Patient Vitals for the past 24 hrs:   BP Temp Temp src Pulse Resp SpO2 Weight   04/13/25 0912 114/85 -- -- 69 -- -- --   04/13/25 0715 114/85 98.2 °F (36.8 °C) Oral -- 18 -- --   04/13/25 0713 114/85 -- -- 69 -- -- --   04/13/25 0700 -- 98.2 °F (36.8 °C) -- -- -- -- --   04/13/25 0600 -- -- -- -- -- -- 108.2 kg (238 lb 8.6 oz)   04/13/25 0255 105/68 98.3 °F (36.8 °C) Oral 67 14 98 % --   04/12/25 2335 126/83 97.6 °F (36.4 °C) Oral 76 16 98 % --   04/12/25 1925 128/89 97.9 °F (36.6 °C) Oral 81 16 97 % --   04/12/25 1228 119/80 98.4 °F (36.9 °C) Oral 79 18 98 % --         Intake/Output Summary (Last 24 hours) at 4/13/2025 1040  Last data filed at 4/13/2025 0255  Gross per 24 hour   Intake 536.45 ml   Output --   Net 536.45 ml        I had a face to face encounter and independently examined this patient on 4/13/2025, as outlined below:  PHYSICAL EXAM:  General: Alert, cooperative  EENT:  EOMI. Anicteric sclerae.  Resp:  CTA bilaterally, no wheezing or rales.  No accessory muscle use  CV:  Regular  rhythm,  No edema  GI:  Soft, Non distended, Non tender.  +Bowel sounds  Neurologic:  Alert and oriented X 3, normal speech,   Psych:   Good insight. Not anxious nor agitated  Skin:  No rashes.  No jaundice    Reviewed most current lab test results and cultures  YES  Reviewed most current radiology test results   YES  Review and summation of old records today    NO  Reviewed patient's current orders and MAR    YES  PMH/SH reviewed - no change compared to H&P    Procedures: see electronic medical records for all procedures/Xrays and details which were not copied into this note but were reviewed prior to creation of Plan.      LABS:  I reviewed today's most current labs and imaging studies.  Pertinent labs include:  Recent Labs     04/11/25  1328 04/12/25  0450 04/13/25  0535   WBC 10.2 10.1 8.2   HGB 15.8 14.3 14.5   HCT 44.9 42.9 43.2    223 211     Recent Labs     04/11/25  1328 
IO Since Admission: 4,398.85 mL [04/22/25 0640]      Last Weight Metrics:      4/22/2025    12:30 AM 4/21/2025     3:39 AM 4/20/2025     3:40 AM 4/19/2025     6:00 AM 4/18/2025     2:28 PM 4/18/2025     6:22 AM 4/17/2025     3:58 AM   Weight Loss Metrics   Height     6' 3\"     Weight - Scale 248 lbs 7 oz 242 lbs 1 oz 241 lbs 10 oz 240 lbs 1 oz  240 lbs 1 oz 242 lbs 5 oz   BMI (Calculated) 31.1 kg/m2 30.3 kg/m2 30.3 kg/m2 30.1 kg/m2  30.1 kg/m2 30.3 kg/m2       Weight change: 2.9 kg (6 lb 6.3 oz)     Accuracy of I/O Report Reviewed: YES    Drips:   Insulin    Number of Albumin Given: 2    Critical Lab Results:  First LES 0.56    Active Consults:   IP CONSULT TO CARDIOLOGY  IP CONSULT TO CARDIAC REHAB  IP CONSULT TO DIABETES MANAGEMENT  IP CONSULT TO INTERVENTIONAL RADIOLOGY  IP CONSULT TO ONCOLOGY  IP CONSULT TO DIABETES MANAGEMENT  IP CONSULT TO CARDIAC REHAB  IP CONSULT TO CASE MANAGEMENT  IP CONSULT TO INTERNAL MEDICINE    Length of Stay:  Expected LOS: 13  Actual LOS: 11    VINICIUS GEORGES RN                     
SubCUTAneous 4x Daily AC & HS    glucose chewable tablet 16 g  4 tablet Oral PRN    dextrose bolus 10% 125 mL  125 mL IntraVENous PRN    Or    dextrose bolus 10% 250 mL  250 mL IntraVENous PRN    glucagon injection 1 mg  1 mg SubCUTAneous PRN    dextrose 10 % infusion   IntraVENous Continuous PRN    nitroglycerin (NITRO-BID) 2 % ointment 1 inch  1 inch Topical 4 times per day    [Held by provider] lisinopril (PRINIVIL;ZESTRIL) tablet 40 mg  40 mg Oral Daily    metoprolol succinate (TOPROL XL) extended release tablet 100 mg  100 mg Oral Daily    sodium chloride flush 0.9 % injection 5-40 mL  5-40 mL IntraVENous 2 times per day    sodium chloride flush 0.9 % injection 5-40 mL  5-40 mL IntraVENous PRN    0.9 % sodium chloride infusion   IntraVENous PRN    potassium chloride (KLOR-CON M) extended release tablet 40 mEq  40 mEq Oral PRN    Or    potassium bicarb-citric acid (EFFER-K) effervescent tablet 40 mEq  40 mEq Oral PRN    Or    potassium chloride 10 mEq/100 mL IVPB (Peripheral Line)  10 mEq IntraVENous PRN    magnesium sulfate 2000 mg in 50 mL IVPB premix  2,000 mg IntraVENous PRN    ondansetron (ZOFRAN-ODT) disintegrating tablet 4 mg  4 mg Oral Q8H PRN    Or    ondansetron (ZOFRAN) injection 4 mg  4 mg IntraVENous Q6H PRN    polyethylene glycol (GLYCOLAX) packet 17 g  17 g Oral Daily PRN    acetaminophen (TYLENOL) tablet 650 mg  650 mg Oral Q6H PRN    Or    acetaminophen (TYLENOL) suppository 650 mg  650 mg Rectal Q6H PRN    melatonin tablet 3 mg  3 mg Oral Nightly PRN         Bernardino Vargas MD  
bicarb-citric acid (EFFER-K) effervescent tablet 40 mEq  40 mEq Oral PRN    Or    potassium chloride 10 mEq/100 mL IVPB (Peripheral Line)  10 mEq IntraVENous PRN    magnesium sulfate 2000 mg in 50 mL IVPB premix  2,000 mg IntraVENous PRN    ondansetron (ZOFRAN-ODT) disintegrating tablet 4 mg  4 mg Oral Q8H PRN    Or    ondansetron (ZOFRAN) injection 4 mg  4 mg IntraVENous Q6H PRN    polyethylene glycol (GLYCOLAX) packet 17 g  17 g Oral Daily PRN    acetaminophen (TYLENOL) suppository 650 mg  650 mg Rectal Q6H PRN    melatonin tablet 3 mg  3 mg Oral Nightly PRN         Mark Oconnell DO  
prior to surgery.   NO CARB LOAD DUE TO A1C.  Please give pt ordered 1g tylenol and 300mg gabapentin morning of surgery (preop holding)  HTN: Baseline BP unknown but 110-130s/70s-80s pre-op. On lisinopril, isosorbide, metoprolol, Norvasc PTA. Would hold lisinopril 48 hours before surgery- placed lisinopril on hold until timing of surgery is more clear.   HLD: on statin and Repatha PTA? Continue statin fow now.   DMII, poorly controlled: Patient of [facility]. A1C 11.2; On Lantus, glipizide PTA. Management per primary team for now- will need insulin gtt pre-op. Diabetes Management following- appreciate the recommendations. Will need pre-op insulin gtt and will not received pre-op carb load as above.   Asthma: Managed by PCP per patient. On albuterol PTA; continue. Pre-op PFTs as above.   Tobacco abuse: Pre-op PFTs as above. Advise cessation.   PAD: ABIs as above. OP follow up with Vascular.   Left jaw swelling- ?immature abscess? Primary team managing- on doxy. Preliminary read of MRI as above states \"2.4 cm superficial fluid collection/mass overlying the inferior pole of the left parotid gland, not significantly changed dating back to 2023; this finding would be amenable to percutaneous ultrasound-guided aspiration/biopsy.\" Per primary team for now- patient states that he had an I&D in 2023- perhaps IR could attempt again?   Pre-op leukocytosis, in the setting of above, resolved: WBC 8.9, afebrile. On doxy as above. Also received prednisone 40 mg po x 3 for possible iodine allergy.       Fluid and electrolytes: PO. Maintain K+ >4 and magnesium level >2.  Nutrition: Cardiac diet. Supplements as ordered.  Activity: OOB all meals and ambulate in halls TID; IS 10-15 x an hour while awake.  Bowel Regimen: Per primary team.   GI ppx: Per primary team.  DVT ppx: SCDs and Heparin gtt  Dispo:  Remain on IVCU for now  Case discussed with: Primary RN    I spent a total of 20 minutes chart reviewing, interviewing patient,

## 2025-04-28 ENCOUNTER — TELEPHONE (OUTPATIENT)
Facility: HOSPITAL | Age: 45
End: 2025-04-28

## 2025-04-28 LAB
BACTERIA SPEC CULT: NORMAL
SERVICE CMNT-IMP: NORMAL

## 2025-04-29 ENCOUNTER — TELEPHONE (OUTPATIENT)
Age: 45
End: 2025-04-29

## 2025-04-29 ENCOUNTER — TELEPHONE (OUTPATIENT)
Facility: HOSPITAL | Age: 45
End: 2025-04-29

## 2025-04-29 NOTE — TELEPHONE ENCOUNTER
Milagros from [facility] called stating they received the referral for patient to have home health through them.  She needs a verbal order from our nurse.

## 2025-04-29 NOTE — TELEPHONE ENCOUNTER
Michael Fournier returned my call. Reviewed plan of care after discharge and encouraged Michael Fournier to verbalize. Discussed precautions and reviewed medications. He did not receive rx for diphenhydramine and stated his insurance should cover it. Called [facility] on [address] ([phone]) and pharmacist stated it's covered on his plan. Called in diphenhydramine 25mg, 1 po q 6 hours as needed for itching or allergies, dispense 30 day supply with no refills. He stated he also needs authorization for the Dexcom G7 Sensor. Pt will contact pharmacy to ensure they sent authorization request to Dr. Judd's office. Encouraged continued use of the incentive spirometer, daily weights and temp. He is not weighing himself. Reviewed signs of fluid overload. He is showering and walking.. Confirmed follow up appts and reinforced importance of wearing red reminder bracelet. He has complaints of pain and knot in left arm where he had an IV. He stated it's swollen and he has been putting warm compresses on it since this morning. He is not taking any oxycodone for the pain. Offered for him to come to the office for the nurse practitioner to examine it, but he declined. He will call the office if it does not get any better. Pt also has not heard from home health. Called [facility] ([phone]) and spoke to Milagros. She stated they are waiting to hear back from Dr. Perkins to see if he will follow the pt. Informed her that Dr. Johnson will be following the pt and to contact his office. Provided contact information. Arline Lockwood RN

## 2025-04-29 NOTE — TELEPHONE ENCOUNTER
Pt's friend, Leela called and stated that the pt is down and depressed and has pain in the arm that his IV was in. Per friend, pt refuses to call and speak to the nurse as \"he doesn't want to return to the hospital.\"  Told her that it is difficult to help him if we cannot speak to or see him. Pt had an appointment scheduled with Dr. Cortez today, but per friend, he called and rescheduled the appointment.  Instructed her to have the pt call the office and speak to the nurse to see if we need to look at his arm today. Friend stated she will reach out to the pt.  Arline Lockwood RN

## 2025-05-01 ENCOUNTER — OFFICE VISIT (OUTPATIENT)
Age: 45
End: 2025-05-01

## 2025-05-01 VITALS
SYSTOLIC BLOOD PRESSURE: 112 MMHG | WEIGHT: 239 LBS | HEART RATE: 84 BPM | TEMPERATURE: 98.4 F | DIASTOLIC BLOOD PRESSURE: 74 MMHG | OXYGEN SATURATION: 99 % | BODY MASS INDEX: 29.87 KG/M2

## 2025-05-01 DIAGNOSIS — L03.90 ACUTE CELLULITIS: ICD-10-CM

## 2025-05-01 DIAGNOSIS — Z95.1 S/P CABG (CORONARY ARTERY BYPASS GRAFT): Primary | ICD-10-CM

## 2025-05-01 RX ORDER — DULAGLUTIDE 0.75 MG/.5ML
0.75 INJECTION, SOLUTION SUBCUTANEOUS WEEKLY
COMMUNITY

## 2025-05-01 RX ORDER — CEPHALEXIN 500 MG/1
500 CAPSULE ORAL 2 TIMES DAILY
Qty: 14 CAPSULE | Refills: 0 | Status: SHIPPED | OUTPATIENT
Start: 2025-05-01 | End: 2025-05-08

## 2025-05-01 RX ORDER — POTASSIUM CHLORIDE 1500 MG/1
20 TABLET, EXTENDED RELEASE ORAL DAILY
Qty: 5 TABLET | Refills: 0 | Status: SHIPPED | OUTPATIENT
Start: 2025-05-01 | End: 2025-05-06

## 2025-05-01 RX ORDER — FUROSEMIDE 40 MG/1
40 TABLET ORAL DAILY
Qty: 5 TABLET | Refills: 0 | Status: SHIPPED | OUTPATIENT
Start: 2025-05-01 | End: 2025-05-06

## 2025-05-01 NOTE — PROGRESS NOTES
Name: Michael Fournier   : 1980   Home Phone: [phone]     Reviewed record in preparation for visit and have obtained necessary documentation. Identified pt with two pt identifiers (name and ).     1. Have you been to the ER, urgent care clinic since your last visit?  Hospitalized since your last visit?No    2. Have you seen or consulted any other health care providers outside of the [facility] since your last visit?  Include any pap smears or colon screening. No      Michael Fournier is being seen for CABG follow up approximately “One week post-operatively.     Patient counseled on Wound care, Diet, ERAS Protocol, Signs of infection, and Office contact information and instructed to follow up  in approximately one week. Patient given opportunity to ask questions and receive clarification.     Current Medications-Reviewed with Patient    Allergies-Reviewed with Patient        Vitals  /74   Pulse 84   Temp 98.4 °F (36.9 °C)   Wt 108.4 kg (239 lb)   SpO2 99%   BMI 29.87 kg/m²

## 2025-05-01 NOTE — PROGRESS NOTES
Patient: Michael Fournier   Age: 45 y.o.     Patient Care Team:  Marbella Perkins APRN - NP as PCP - General (Nurse Practitioner)  Sameer Krishna MD (Cardiothoracic Surgery)  Bernardino Vargas MD as Consulting Physician (Cardiovascular Disease)  Cheng Johnson MD as Consulting Physician (Cardiothoracic Surgery)    Diagnosis: The encounter diagnosis was S/P CABG (coronary artery bypass graft).    Problem List:   Patient Active Problem List   Diagnosis    Acute pancreatitis    Acute chest pain    Coronary artery disease of native artery of native heart with stable angina pectoris    Dyslipidemia (high LDL; low HDL)    Erectile dysfunction due to diseases classified elsewhere    Essential hypertension, malignant    Hyperlipidemia    S/P angioplasty with stent    STEMI (ST elevation myocardial infarction) ([facility])      Date of Surgery: 4/11/25     Surgery: ON-PUMP CORONARY ARTERY BYPASS GRAFTING TIMES TWO WITH LEFT INTERNAL MAMMARY ARTERY, ENDOSCOPIC HARVESTING OF THE RIGHT GREATER SAPHENOUS VEIN, AND LEFT ATRIAL APPENDAGE LIGATION (E.R.A.S.)     HPI:  Pt is here for post op follow up. He is ambulating well without assistance. He denies SOB, weight gain. He has had worsening LE edema for the past 2 days. Weight is stable. He also reports worsening swelling and redness on his right arm where IV infiltrated. He has not checked his BS since discharge.     Current Medications:   Current Outpatient Medications   Medication Sig Dispense Refill    dulaglutide (TRULICITY) 0.75 MG/0.5ML SOAJ SC injection Inject 0.5 mLs into the skin once a week      colchicine (COLCRYS) 0.6 MG tablet Take 1 tablet by mouth daily 30 tablet 3    insulin lispro, 1 Unit Dial, (HUMALOG KWIKPEN) 100 UNIT/ML SOPN Inject 10 Units into the skin 3 times daily (before meals) 5 Adjustable Dose Pre-filled Pen Syringe 3    Insulin Pen Needle 32G X 4 MM MISC 1 each by Does not apply route daily 100 each 3    amiodarone (CORDARONE) 200 MG tablet Take two tablets

## 2025-05-02 ENCOUNTER — TELEPHONE (OUTPATIENT)
Age: 45
End: 2025-05-02

## 2025-05-02 NOTE — TELEPHONE ENCOUNTER
Received call back from Dr. Dutch James's nurse regarding getting patient scheduled for a follow up with Dr. Judd.  She states that Dr. Judd spoke with the patient while he was inpatient at [facility] and informed him to follow up with his PCP for his diabetes.  Dr. Judd's next available new patient appointments are not until next year.

## 2025-05-05 LAB
BACTERIA SPEC CULT: NORMAL
SERVICE CMNT-IMP: NORMAL

## 2025-05-12 LAB
BACTERIA SPEC CULT: NORMAL
SERVICE CMNT-IMP: NORMAL

## 2025-05-15 ENCOUNTER — HOSPITAL ENCOUNTER (OUTPATIENT)
Facility: HOSPITAL | Age: 45
Setting detail: RECURRING SERIES
Discharge: HOME | End: 2025-05-18
Payer: COMMERCIAL

## 2025-05-15 VITALS — WEIGHT: 239 LBS | BODY MASS INDEX: 29.87 KG/M2

## 2025-05-15 PROCEDURE — 93797 PHYS/QHP OP CAR RHAB WO ECG: CPT

## 2025-05-15 PROCEDURE — 93798 PHYS/QHP OP CAR RHAB W/ECG: CPT

## 2025-05-15 ASSESSMENT — EXERCISE STRESS TEST
PEAK_HR: 94
PEAK_METS: 2.9
PEAK_RPE: 13
PEAK_BP: 150/80

## 2025-05-15 ASSESSMENT — PATIENT HEALTH QUESTIONNAIRE - PHQ9
6. FEELING BAD ABOUT YOURSELF - OR THAT YOU ARE A FAILURE OR HAVE LET YOURSELF OR YOUR FAMILY DOWN: MORE THAN HALF THE DAYS
8. MOVING OR SPEAKING SO SLOWLY THAT OTHER PEOPLE COULD HAVE NOTICED. OR THE OPPOSITE, BEING SO FIGETY OR RESTLESS THAT YOU HAVE BEEN MOVING AROUND A LOT MORE THAN USUAL: NOT AT ALL
SUM OF ALL RESPONSES TO PHQ QUESTIONS 1-9: 11
7. TROUBLE CONCENTRATING ON THINGS, SUCH AS READING THE NEWSPAPER OR WATCHING TELEVISION: SEVERAL DAYS
1. LITTLE INTEREST OR PLEASURE IN DOING THINGS: SEVERAL DAYS
SUM OF ALL RESPONSES TO PHQ QUESTIONS 1-9: 11
SUM OF ALL RESPONSES TO PHQ QUESTIONS 1-9: 11
3. TROUBLE FALLING OR STAYING ASLEEP: MORE THAN HALF THE DAYS
10. IF YOU CHECKED OFF ANY PROBLEMS, HOW DIFFICULT HAVE THESE PROBLEMS MADE IT FOR YOU TO DO YOUR WORK, TAKE CARE OF THINGS AT HOME, OR GET ALONG WITH OTHER PEOPLE: NOT DIFFICULT AT ALL
9. THOUGHTS THAT YOU WOULD BE BETTER OFF DEAD, OR OF HURTING YOURSELF: NOT AT ALL
2. FEELING DOWN, DEPRESSED OR HOPELESS: MORE THAN HALF THE DAYS
SUM OF ALL RESPONSES TO PHQ QUESTIONS 1-9: 11
4. FEELING TIRED OR HAVING LITTLE ENERGY: MORE THAN HALF THE DAYS
5. POOR APPETITE OR OVEREATING: SEVERAL DAYS

## 2025-05-15 NOTE — CARDIO/PULMONARY
INTAKE APPOINTMENT NOTE  5/15/2025    NAME: Michael Fournier : 1980 AGE: 45 y.o.  GENDER: male    CARDIAC REHAB ADMITTING DIAGNOSIS: S/P CABG (coronary artery bypass graft) [Z95.1]    REFERRING PHYSICIAN: Cheng Johnson MD    MEDICAL HX:  Past Medical History:   Diagnosis Date    Asthma     Diabetes (HCC)     Gall stone     Hemorrhoid     Hypertension     Melanoma (HCC)     STOMACH       LABS:     No results found for: \"HBA1C\", \"[id number]\"  Lab Results   Component Value Date/Time    CHOL 93 2025 04:50 AM    HDL 26 2025 04:50 AM    LDL 34 2025 04:50 AM    .6 10/27/2022 11:11 AM    VLDL 33 2025 04:50 AM         ANTHROPOMETRICS:      Ht Readings from Last 1 Encounters:   25 1.905 m (6' 3\")      Wt Readings from Last 1 Encounters:   05/15/25 108.4 kg (239 lb)        WAIST: 43       VISIT SUMMARY:    Michael Fournier 45 y.o. presented to Cardiac Rehab for program orientation and 6 minute walk test today with a primary diagnosis of S/P CABG (coronary artery bypass graft) [Z95.1]. EF is 55 % Cardiac risk factors include smoking/ tobacco exposure, family history, dyslipidemia, diabetes mellitus, hypertension, stress, prior MI.  Patient is a former smoker (cigars). Quit 1 year ago. Smoking cessation plan includes nicotine patches. Tobacco smart goal includes continued cessation.   Michael Fournier is not  and lives alone. Patient was evaluated for depression using the PHQ-9 assessment tool with a result of 11 which is considered moderate. The result was discussed with patient. Results faxed to PCP. Pt admits to feeling down and enjoys spending time with his daughter for stress relief.   Patient denied chest pain or SOB during 6 minute walk test and was in SR/ST. Patient walked 370 meters at a speed of 2.5 and grade of 0 for a final MET level of 2.9.   Exercise prescription developed using exercise tolerance results and patient stated goals, to be supplemented with

## 2025-05-19 ENCOUNTER — APPOINTMENT (OUTPATIENT)
Facility: HOSPITAL | Age: 45
End: 2025-05-19
Payer: COMMERCIAL

## 2025-05-19 LAB
BACTERIA SPEC CULT: NORMAL
SERVICE CMNT-IMP: NORMAL

## 2025-05-21 ENCOUNTER — APPOINTMENT (OUTPATIENT)
Facility: HOSPITAL | Age: 45
End: 2025-05-21
Payer: COMMERCIAL

## 2025-05-28 ENCOUNTER — HOSPITAL ENCOUNTER (OUTPATIENT)
Facility: HOSPITAL | Age: 45
Setting detail: RECURRING SERIES
Discharge: HOME | End: 2025-05-31
Payer: COMMERCIAL

## 2025-05-28 VITALS — WEIGHT: 244 LBS | BODY MASS INDEX: 30.5 KG/M2

## 2025-05-28 PROCEDURE — 93798 PHYS/QHP OP CAR RHAB W/ECG: CPT

## 2025-05-28 ASSESSMENT — EXERCISE STRESS TEST
PEAK_METS: 2.8
PEAK_RPE: 13
PEAK_HR: 107

## 2025-05-29 ENCOUNTER — OFFICE VISIT (OUTPATIENT)
Age: 45
End: 2025-05-29

## 2025-05-29 VITALS
SYSTOLIC BLOOD PRESSURE: 117 MMHG | OXYGEN SATURATION: 100 % | BODY MASS INDEX: 29.6 KG/M2 | WEIGHT: 236.8 LBS | TEMPERATURE: 98 F | HEART RATE: 73 BPM | DIASTOLIC BLOOD PRESSURE: 79 MMHG

## 2025-05-29 DIAGNOSIS — Z95.1 S/P CABG (CORONARY ARTERY BYPASS GRAFT): Primary | ICD-10-CM

## 2025-05-29 PROCEDURE — 99024 POSTOP FOLLOW-UP VISIT: CPT | Performed by: THORACIC SURGERY (CARDIOTHORACIC VASCULAR SURGERY)

## 2025-05-29 RX ORDER — LISINOPRIL 40 MG/1
40 TABLET ORAL DAILY
COMMUNITY

## 2025-05-29 RX ORDER — EZETIMIBE 10 MG/1
10 TABLET ORAL DAILY
COMMUNITY

## 2025-05-29 RX ORDER — ISOSORBIDE MONONITRATE 30 MG/1
30 TABLET, EXTENDED RELEASE ORAL DAILY
COMMUNITY

## 2025-05-29 RX ORDER — DAPAGLIFLOZIN 5 MG/1
5 TABLET, FILM COATED ORAL DAILY
COMMUNITY

## 2025-05-29 NOTE — PROGRESS NOTES
Patient: Michael Fournier   Age: 45 y.o.     Patient Care Team:  Marbella Perkins APRN - NP as PCP - General (Nurse Practitioner)  Sameer Krishna MD (Cardiothoracic Surgery)  Bernardino Vargas MD as Consulting Physician (Cardiovascular Disease)  Cheng Johnson MD as Consulting Physician (Cardiothoracic Surgery)    Diagnosis: The encounter diagnosis was S/P CABG (coronary artery bypass graft).    Problem List:   Patient Active Problem List   Diagnosis    Acute pancreatitis    Acute chest pain    Coronary artery disease of native artery of native heart with stable angina pectoris    Dyslipidemia (high LDL; low HDL)    Erectile dysfunction due to diseases classified elsewhere    Essential hypertension, malignant    Hyperlipidemia    S/P angioplasty with stent    STEMI (ST elevation myocardial infarction) ([facility])      Date of Surgery: 4/21/25     Surgery: ON-PUMP CORONARY ARTERY BYPASS GRAFTING TIMES TWO WITH LEFT INTERNAL MAMMARY ARTERY, ENDOSCOPIC HARVESTING OF THE RIGHT GREATER SAPHENOUS VEIN, AND LEFT ATRIAL APPENDAGE LIGATION (E.R.A.S.)     HPI:  Pt is here for post op follow up. He is ambulating well without assistance. He denies SOB, weight gain, edema. Weight is stable. He is monitoring his BS with fingersticks well controlled. He has a good appetite. He reports feeling much better.    Current Medications:   Current Outpatient Medications   Medication Sig Dispense Refill    ezetimibe (ZETIA) 10 MG tablet Take 1 tablet by mouth daily      isosorbide mononitrate (IMDUR) 30 MG extended release tablet Take 1 tablet by mouth daily      lisinopril (PRINIVIL;ZESTRIL) 40 MG tablet Take 1 tablet by mouth daily      FARXIGA 5 MG tablet Take 1 tablet by mouth daily      dulaglutide (TRULICITY) 0.75 MG/0.5ML SOAJ SC injection Inject 0.5 mLs into the skin once a week      colchicine (COLCRYS) 0.6 MG tablet Take 1 tablet by mouth daily 30 tablet 3    insulin lispro, 1 Unit Dial, (HUMALOG KWIKPEN) 100 UNIT/ML SOPN Inject 10

## 2025-05-30 ENCOUNTER — OFFICE VISIT (OUTPATIENT)
Age: 45
End: 2025-05-30
Payer: COMMERCIAL

## 2025-05-30 VITALS
HEART RATE: 89 BPM | BODY MASS INDEX: 29.47 KG/M2 | DIASTOLIC BLOOD PRESSURE: 75 MMHG | OXYGEN SATURATION: 95 % | TEMPERATURE: 98.3 F | WEIGHT: 237 LBS | SYSTOLIC BLOOD PRESSURE: 112 MMHG | HEIGHT: 75 IN

## 2025-05-30 DIAGNOSIS — L72.3 SEBACEOUS CYST: Primary | ICD-10-CM

## 2025-05-30 PROCEDURE — 3078F DIAST BP <80 MM HG: CPT | Performed by: SURGERY

## 2025-05-30 PROCEDURE — 3074F SYST BP LT 130 MM HG: CPT | Performed by: SURGERY

## 2025-05-30 PROCEDURE — 99203 OFFICE O/P NEW LOW 30 MIN: CPT | Performed by: SURGERY

## 2025-05-30 RX ORDER — METOPROLOL TARTRATE 100 MG/1
100 TABLET ORAL
COMMUNITY
Start: 2025-05-11 | End: 2025-08-09

## 2025-05-30 ASSESSMENT — PATIENT HEALTH QUESTIONNAIRE - PHQ9
SUM OF ALL RESPONSES TO PHQ QUESTIONS 1-9: 0
SUM OF ALL RESPONSES TO PHQ QUESTIONS 1-9: 0
2. FEELING DOWN, DEPRESSED OR HOPELESS: NOT AT ALL
SUM OF ALL RESPONSES TO PHQ QUESTIONS 1-9: 0
1. LITTLE INTEREST OR PLEASURE IN DOING THINGS: NOT AT ALL
SUM OF ALL RESPONSES TO PHQ QUESTIONS 1-9: 0

## 2025-06-02 ENCOUNTER — HOSPITAL ENCOUNTER (OUTPATIENT)
Facility: HOSPITAL | Age: 45
Setting detail: RECURRING SERIES
Discharge: HOME | End: 2025-06-05
Payer: COMMERCIAL

## 2025-06-02 VITALS — WEIGHT: 239 LBS | BODY MASS INDEX: 29.87 KG/M2

## 2025-06-02 LAB
ACID FAST STN SPEC: NEGATIVE
MYCOBACTERIUM SPEC QL CULT: NEGATIVE
SPECIMEN PREPARATION: NORMAL
SPECIMEN SOURCE: NORMAL

## 2025-06-02 PROCEDURE — 93798 PHYS/QHP OP CAR RHAB W/ECG: CPT

## 2025-06-02 ASSESSMENT — EXERCISE STRESS TEST
PEAK_METS: 2.8
PEAK_RPE: 13
PEAK_HR: 118

## 2025-06-04 ENCOUNTER — HOSPITAL ENCOUNTER (OUTPATIENT)
Facility: HOSPITAL | Age: 45
Setting detail: RECURRING SERIES
Discharge: HOME | End: 2025-06-07
Payer: COMMERCIAL

## 2025-06-04 VITALS — BODY MASS INDEX: 29.62 KG/M2 | WEIGHT: 237 LBS

## 2025-06-04 PROCEDURE — 93798 PHYS/QHP OP CAR RHAB W/ECG: CPT

## 2025-06-04 ASSESSMENT — EXERCISE STRESS TEST
PEAK_HR: 111
PEAK_RPE: 13
PEAK_METS: 2.8

## 2025-06-11 ENCOUNTER — HOSPITAL ENCOUNTER (OUTPATIENT)
Facility: HOSPITAL | Age: 45
Setting detail: RECURRING SERIES
Discharge: HOME | End: 2025-06-14
Payer: COMMERCIAL

## 2025-06-11 VITALS — BODY MASS INDEX: 29.87 KG/M2 | WEIGHT: 239 LBS

## 2025-06-11 PROCEDURE — 93798 PHYS/QHP OP CAR RHAB W/ECG: CPT

## 2025-06-11 ASSESSMENT — EXERCISE STRESS TEST
PEAK_HR: 103
PEAK_RPE: 13
PEAK_METS: 2.8

## 2025-06-11 NOTE — PROGRESS NOTES
Identified pt with two pt identifiers (name and ). Reviewed chart in preparation for visit and have obtained necessary documentation.    Michael Fournier is a 45 y.o. male  Chief Complaint   Patient presents with    Skin Problem     abcess of L submandibular region,     /75 (BP Site: Right Upper Arm, Patient Position: Sitting, BP Cuff Size: Large Adult)   Pulse 89   Temp 98.3 °F (36.8 °C) (Temporal)   Ht 1.905 m (6' 3\")   Wt 107.5 kg (237 lb)   SpO2 95%   BMI 29.62 kg/m²     1. Have you been to the ER, urgent care clinic since your last visit?  Hospitalized since your last visit?no    2. Have you seen or consulted any other health care providers outside of the [facility] since your last visit?  Include any pap smears or colon screening. no   
evaluation of a cyst on his face.    He reports that the cyst has significantly reduced in size over the past 3 days. Antibiotics were previously prescribed, which were discontinued 2 weeks ago. Redness and swelling were noted last weekend, but these symptoms have since subsided. Coronary artery bypass graft surgery was performed on 04/21/2025, during which the cyst was partially drained. He is not currently on any anticoagulant therapy.    Additional Information:  A valve replacement was also performed during the coronary artery bypass graft surgery. He was informed that he could not get his teeth cleaned for a period of time. A dental visit occurred 3 weeks ago, but teeth cleaning was not performed due to concerns related to blood dental.    PAST SURGICAL HISTORY:  Coronary artery bypass graft surgery on 04/21/2025 with valve replacement.    Past Medical History:   Diagnosis Date    Anxiety     Asthma     Diabetes (HCC)     Gall stone 2022    Headache     Hemorrhoid     Hypertension     Melanoma (HCC) 2010    STOMACH      Past Surgical History:   Procedure Laterality Date    CARDIAC CATHETERIZATION  08/07/2024    stent and  PTCA at [facility]    CARDIAC PROCEDURE N/A 4/14/2025    Left heart cath / coronary angiography performed by Bernardino Vargas MD at [facility] CARDIAC CATH LAB    CHOLECYSTECTOMY  12/06/2022    Dr Cedric Wilson    COLONOSCOPY N/A 06/23/2022    COLONOSCOPY performed by Facundo Sandy MD at [facility] ENDOSCOPY    COLONOSCOPY,SYD ROSSI,DEMETRIS  06/23/2022         CORONARY ANGIOPLASTY      CORONARY ARTERY BYPASS GRAFT N/A 4/21/2025    ON-PUMP CORONARY ARTERY BYPASS GRAFTING TIMES TWO WITH LEFT INTERNAL MAMMARY ARTERY, ENDOSCOPIC HARVESTING OF THE RIGHT GREATER SAPHENOUS VEIN, AND LEFT ATRIAL APPENDAGE LIGATION (E.R.A.S.). LATOYA BY DR. HAUSER. performed by Cheng Johnson MD at [facility] OPEN HEART    GI      COLONOSCOPY    GI      HEMORRHOIDECTOMY    INVASIVE VASCULAR N/A 4/14/2025    Ultrasound guided vascular access

## 2025-06-16 ENCOUNTER — APPOINTMENT (OUTPATIENT)
Facility: HOSPITAL | Age: 45
End: 2025-06-16
Payer: COMMERCIAL

## 2025-06-18 ENCOUNTER — APPOINTMENT (OUTPATIENT)
Facility: HOSPITAL | Age: 45
End: 2025-06-18
Payer: COMMERCIAL

## 2025-06-18 ENCOUNTER — HOSPITAL ENCOUNTER (OUTPATIENT)
Facility: HOSPITAL | Age: 45
Setting detail: RECURRING SERIES
Discharge: HOME | End: 2025-06-21
Payer: COMMERCIAL

## 2025-06-18 VITALS — BODY MASS INDEX: 30.12 KG/M2 | WEIGHT: 241 LBS

## 2025-06-18 PROCEDURE — 93798 PHYS/QHP OP CAR RHAB W/ECG: CPT

## 2025-06-18 ASSESSMENT — EXERCISE STRESS TEST
PEAK_RPE: 13
PEAK_HR: 103
PEAK_METS: 2.8

## 2025-06-23 ENCOUNTER — HOSPITAL ENCOUNTER (OUTPATIENT)
Facility: HOSPITAL | Age: 45
Setting detail: RECURRING SERIES
Discharge: HOME | End: 2025-06-26
Payer: COMMERCIAL

## 2025-06-23 ENCOUNTER — APPOINTMENT (OUTPATIENT)
Facility: HOSPITAL | Age: 45
End: 2025-06-23
Payer: COMMERCIAL

## 2025-06-23 VITALS — BODY MASS INDEX: 30 KG/M2 | WEIGHT: 240 LBS

## 2025-06-23 PROCEDURE — 93798 PHYS/QHP OP CAR RHAB W/ECG: CPT

## 2025-06-23 ASSESSMENT — EXERCISE STRESS TEST
PEAK_METS: 2.8
PEAK_HR: 112
PEAK_RPE: 10

## 2025-06-25 ENCOUNTER — APPOINTMENT (OUTPATIENT)
Facility: HOSPITAL | Age: 45
End: 2025-06-25
Payer: COMMERCIAL

## 2025-06-25 ENCOUNTER — HOSPITAL ENCOUNTER (OUTPATIENT)
Facility: HOSPITAL | Age: 45
Setting detail: RECURRING SERIES
Discharge: HOME | End: 2025-06-28
Payer: COMMERCIAL

## 2025-06-25 VITALS — BODY MASS INDEX: 29.37 KG/M2 | WEIGHT: 235 LBS

## 2025-06-25 PROCEDURE — 93798 PHYS/QHP OP CAR RHAB W/ECG: CPT

## 2025-06-25 ASSESSMENT — EXERCISE STRESS TEST
PEAK_METS: 2.8
PEAK_HR: 126
PEAK_RPE: 13

## 2025-06-30 ENCOUNTER — APPOINTMENT (OUTPATIENT)
Facility: HOSPITAL | Age: 45
End: 2025-06-30
Payer: COMMERCIAL

## 2025-07-07 ENCOUNTER — HOSPITAL ENCOUNTER (OUTPATIENT)
Facility: HOSPITAL | Age: 45
Setting detail: RECURRING SERIES
Discharge: HOME | End: 2025-07-10
Payer: COMMERCIAL

## 2025-07-07 VITALS — BODY MASS INDEX: 29.5 KG/M2 | WEIGHT: 236 LBS

## 2025-07-07 PROCEDURE — 93798 PHYS/QHP OP CAR RHAB W/ECG: CPT

## 2025-07-07 ASSESSMENT — EXERCISE STRESS TEST
PEAK_HR: 100
PEAK_RPE: 10
PEAK_METS: 2.8

## 2025-07-09 ENCOUNTER — HOSPITAL ENCOUNTER (OUTPATIENT)
Facility: HOSPITAL | Age: 45
Setting detail: RECURRING SERIES
Discharge: HOME | End: 2025-07-12
Payer: COMMERCIAL

## 2025-07-09 VITALS — WEIGHT: 234 LBS | BODY MASS INDEX: 29.25 KG/M2

## 2025-07-09 PROCEDURE — 93798 PHYS/QHP OP CAR RHAB W/ECG: CPT

## 2025-07-09 ASSESSMENT — EXERCISE STRESS TEST
PEAK_RPE: 12
PEAK_METS: 4.2
PEAK_HR: 110

## 2025-07-14 ENCOUNTER — HOSPITAL ENCOUNTER (OUTPATIENT)
Facility: HOSPITAL | Age: 45
Setting detail: RECURRING SERIES
Discharge: HOME | End: 2025-07-17
Payer: COMMERCIAL

## 2025-07-14 ENCOUNTER — HOSPITAL ENCOUNTER (OUTPATIENT)
Facility: HOSPITAL | Age: 45
Setting detail: RECURRING SERIES
End: 2025-07-14
Payer: COMMERCIAL

## 2025-07-14 VITALS — BODY MASS INDEX: 29.62 KG/M2 | WEIGHT: 237 LBS

## 2025-07-14 PROCEDURE — 93798 PHYS/QHP OP CAR RHAB W/ECG: CPT

## 2025-07-14 ASSESSMENT — EXERCISE STRESS TEST
PEAK_RPE: 10
PEAK_METS: 4.2
PEAK_HR: 97

## 2025-07-16 ENCOUNTER — HOSPITAL ENCOUNTER (OUTPATIENT)
Facility: HOSPITAL | Age: 45
Setting detail: RECURRING SERIES
Discharge: HOME | End: 2025-07-19
Payer: COMMERCIAL

## 2025-07-16 VITALS — WEIGHT: 236 LBS | BODY MASS INDEX: 29.5 KG/M2

## 2025-07-16 PROCEDURE — 93798 PHYS/QHP OP CAR RHAB W/ECG: CPT

## 2025-07-16 ASSESSMENT — EXERCISE STRESS TEST
PEAK_RPE: 13
PEAK_METS: 4.2
PEAK_HR: 120

## 2025-07-21 ENCOUNTER — HOSPITAL ENCOUNTER (OUTPATIENT)
Facility: HOSPITAL | Age: 45
Setting detail: RECURRING SERIES
Discharge: HOME | End: 2025-07-24
Payer: COMMERCIAL

## 2025-07-21 VITALS — WEIGHT: 236 LBS | BODY MASS INDEX: 29.5 KG/M2

## 2025-07-21 PROCEDURE — 93798 PHYS/QHP OP CAR RHAB W/ECG: CPT

## 2025-07-21 ASSESSMENT — EXERCISE STRESS TEST
PEAK_METS: 4.2
PEAK_RPE: 13
PEAK_HR: 112

## 2025-07-23 ENCOUNTER — HOSPITAL ENCOUNTER (OUTPATIENT)
Facility: HOSPITAL | Age: 45
Setting detail: RECURRING SERIES
Discharge: HOME | End: 2025-07-26
Payer: COMMERCIAL

## 2025-07-23 PROCEDURE — 93798 PHYS/QHP OP CAR RHAB W/ECG: CPT

## 2025-07-30 ENCOUNTER — HOSPITAL ENCOUNTER (OUTPATIENT)
Facility: HOSPITAL | Age: 45
Setting detail: RECURRING SERIES
Discharge: HOME | End: 2025-08-02
Payer: COMMERCIAL

## 2025-07-30 VITALS — WEIGHT: 236 LBS | BODY MASS INDEX: 29.5 KG/M2

## 2025-07-30 PROCEDURE — 93798 PHYS/QHP OP CAR RHAB W/ECG: CPT

## 2025-07-30 ASSESSMENT — EXERCISE STRESS TEST
PEAK_METS: 4.2
PEAK_HR: 102
PEAK_RPE: 13

## 2025-08-04 ENCOUNTER — HOSPITAL ENCOUNTER (OUTPATIENT)
Facility: HOSPITAL | Age: 45
Setting detail: RECURRING SERIES
Discharge: HOME | End: 2025-08-07
Payer: COMMERCIAL

## 2025-08-04 VITALS — WEIGHT: 239 LBS | BODY MASS INDEX: 29.87 KG/M2

## 2025-08-04 PROCEDURE — 93798 PHYS/QHP OP CAR RHAB W/ECG: CPT

## 2025-08-04 ASSESSMENT — EXERCISE STRESS TEST
PEAK_RPE: 12
PEAK_HR: 106
PEAK_METS: 4.2

## 2025-08-06 ENCOUNTER — HOSPITAL ENCOUNTER (OUTPATIENT)
Facility: HOSPITAL | Age: 45
Setting detail: RECURRING SERIES
Discharge: HOME | End: 2025-08-09
Payer: COMMERCIAL

## 2025-08-06 VITALS — WEIGHT: 235 LBS | BODY MASS INDEX: 29.37 KG/M2

## 2025-08-06 PROCEDURE — 93798 PHYS/QHP OP CAR RHAB W/ECG: CPT

## 2025-08-06 ASSESSMENT — EXERCISE STRESS TEST
PEAK_RPE: 12
PEAK_METS: 4.2
PEAK_HR: 126

## 2025-08-13 ENCOUNTER — HOSPITAL ENCOUNTER (OUTPATIENT)
Facility: HOSPITAL | Age: 45
Setting detail: RECURRING SERIES
Discharge: HOME | End: 2025-08-16
Payer: COMMERCIAL

## 2025-08-13 VITALS — BODY MASS INDEX: 29.75 KG/M2 | WEIGHT: 238 LBS

## 2025-08-13 PROCEDURE — 93798 PHYS/QHP OP CAR RHAB W/ECG: CPT

## 2025-08-13 ASSESSMENT — EXERCISE STRESS TEST
PEAK_METS: 4.2
PEAK_RPE: 13
PEAK_HR: 124

## 2025-08-18 ENCOUNTER — HOSPITAL ENCOUNTER (OUTPATIENT)
Facility: HOSPITAL | Age: 45
Setting detail: RECURRING SERIES
Discharge: HOME | End: 2025-08-21
Payer: COMMERCIAL

## 2025-08-18 VITALS — BODY MASS INDEX: 29.87 KG/M2 | WEIGHT: 239 LBS

## 2025-08-18 PROCEDURE — 93798 PHYS/QHP OP CAR RHAB W/ECG: CPT

## 2025-08-18 ASSESSMENT — EXERCISE STRESS TEST
PEAK_HR: 138
PEAK_RPE: 12
PEAK_METS: 4.8

## 2025-08-25 ENCOUNTER — HOSPITAL ENCOUNTER (OUTPATIENT)
Facility: HOSPITAL | Age: 45
Setting detail: RECURRING SERIES
Discharge: HOME | End: 2025-08-28
Payer: COMMERCIAL

## 2025-08-25 VITALS — WEIGHT: 240 LBS | BODY MASS INDEX: 30 KG/M2

## 2025-08-25 PROCEDURE — 93798 PHYS/QHP OP CAR RHAB W/ECG: CPT

## 2025-08-25 ASSESSMENT — EXERCISE STRESS TEST
PEAK_METS: 4.8
PEAK_HR: 140
PEAK_RPE: 12

## 2025-08-27 ENCOUNTER — HOSPITAL ENCOUNTER (OUTPATIENT)
Facility: HOSPITAL | Age: 45
Setting detail: RECURRING SERIES
Discharge: HOME | End: 2025-08-30
Payer: COMMERCIAL

## 2025-08-27 VITALS — WEIGHT: 238 LBS | BODY MASS INDEX: 29.75 KG/M2

## 2025-08-27 PROCEDURE — 93798 PHYS/QHP OP CAR RHAB W/ECG: CPT

## 2025-08-27 ASSESSMENT — EXERCISE STRESS TEST
PEAK_HR: 139
PEAK_METS: 4.8
PEAK_RPE: 13

## (undated) DEVICE — INTENT TO BE USED WITH SUTURE MATERIAL FOR TISSUE CLOSURE: Brand: RICHARD-ALLAN® NEEDLE 1/2 CIRCLE TAPER

## (undated) DEVICE — SUTURE SZ 7 L18IN NONABSORBABLE SIL CCS L48MM 1/2 CIR STRNM M655G

## (undated) DEVICE — APPLIER CLP M L L11.4IN DIA10MM ENDOSCP ROT MULT FOR LIG

## (undated) DEVICE — Z DISCONTINUED PER MEDLINE LINE GAS SAMPLING O2/CO2 LNG AD 13 FT NSL W/ TBNG FILTERLINE

## (undated) DEVICE — AVID DUAL STAGE VENOUS DRAINAGE CANNULA: Brand: AVID DUAL STAGE VENOUS DRAINAGE CANNULA

## (undated) DEVICE — SYRINGE MEDICAL 3ML CLEAR PLASTIC STANDARD NON CONTROL LUERLOCK TIP DISPOSABLE

## (undated) DEVICE — PRESSURE MONITORING SET: Brand: TRUWAVE

## (undated) DEVICE — LEAD PACE L475MM CHNL A OR V MYOCARDIAL STEROID ELUT SIL

## (undated) DEVICE — DISSECTOR LAP DIA5MM BLNT TIP ENDOPATH

## (undated) DEVICE — CATH IV AUTOGRD BC PNK 20GA 25 -- INSYTE

## (undated) DEVICE — SYRINGE MED 50ML LUERLOCK TIP

## (undated) DEVICE — GENERAL LAPAROSCOPY - SMH: Brand: MEDLINE INDUSTRIES, INC.

## (undated) DEVICE — TRAP,MUCUS SPECIMEN, 80CC: Brand: MEDLINE

## (undated) DEVICE — SOLUTION IRRIG 1000ML H2O PIC PLAS SHATTERPROOF CONTAINER

## (undated) DEVICE — CANISTER, RIGID, 3000CC: Brand: MEDLINE INDUSTRIES, INC.

## (undated) DEVICE — CATHETER THOR 32FR L23IN PVC 6 EYELET STR ATRAUM

## (undated) DEVICE — BLANKET WRM W25XL64IN NONWOVEN SFT LTWT PLIABLE HYPR

## (undated) DEVICE — BLADE ASSEMB CLP HAIR FINE --

## (undated) DEVICE — DISK-SHAPED STYLE, SILICONE (1 PER STERILE PKG): Brand: SCANLAN® RADIOMARK® GRAFT MARKERS

## (undated) DEVICE — DERMABOND SKIN ADH 0.7ML -- DERMABOND ADVANCED 12/BX

## (undated) DEVICE — CONTAINER SPEC 20 ML LID NEUT BUFF FORMALIN 10 % POLYPR STS

## (undated) DEVICE — SUTURE ABSORBABLE MONOFILAMENT 3-0 PS-2 15 CM 19 MM UD

## (undated) DEVICE — LAPAROSCOPIC TROCAR SLEEVE/SINGLE USE: Brand: KII® OPTICAL ACCESS SYSTEM

## (undated) DEVICE — RETRACTOR SURG INSRT SUT HLD OCTOBASE

## (undated) DEVICE — TOWEL 4 PLY TISS 19X30 SUE WHT

## (undated) DEVICE — GLIDESHEATH SLENDER ACCESS KIT: Brand: GLIDESHEATH SLENDER

## (undated) DEVICE — SYRINGE ANGIO 10 CC BRL STD PRNT POLYCARB LT BLU MEDALLION

## (undated) DEVICE — LIQUIBAND RAPID ADHESIVE 36/CS 0.8ML: Brand: MEDLINE

## (undated) DEVICE — SYSTEM EVAC SMOKE LAPARSCOPIC

## (undated) DEVICE — SOLUTION IRRIG 1000ML 09% SOD CHL USP PIC PLAS CONTAINER

## (undated) DEVICE — SUT MCRYL 4-0 27IN PS2 UD --

## (undated) DEVICE — DUAL LUMEN STOMACH TUBE MULTI-FUNCTIONAL PORT: Brand: SALEM SUMP

## (undated) DEVICE — C-ARM: Brand: UNBRANDED

## (undated) DEVICE — TEMP PACING WIRE: Brand: MYO/WIRE

## (undated) DEVICE — SPONGE GZ W4XL4IN COT RADPQ HIGHLY ABSRB STERILE

## (undated) DEVICE — DRESSING FOAM W8.7XL9.1IN SAFETAC LAYR SELF ADH MEPILEX

## (undated) DEVICE — SYR 20ML LL STRL LF --

## (undated) DEVICE — AEGIS 1" DISK 4MM HOLE, PEEL OPEN: Brand: MEDLINE

## (undated) DEVICE — CLICKLINE SCISSORS INSERT: Brand: CLICKLINE

## (undated) DEVICE — SOLUTION IV 500 ML 0.9 NACL INJ EXCEL CONTAINER USP LF

## (undated) DEVICE — HI-TORQUE VERSACORE FLOPPY GUIDE WIRE SYSTEM 145 CM: Brand: HI-TORQUE VERSACORE

## (undated) DEVICE — CENTRAL VENOUS CATHETER SET: Brand: COOK

## (undated) DEVICE — HYPODERMIC SAFETY NEEDLE: Brand: MONOJECT

## (undated) DEVICE — ELECTRODE ES 36CM LAP FLAT L HK COAT DISP CLEANCOAT

## (undated) DEVICE — DRAPE SLUSH DISC W44XL66IN ST FOR RND BSIN HUSH SLUSH SYS

## (undated) DEVICE — SYRINGE MED 10ML LUERLOCK TIP W/O SFTY DISP

## (undated) DEVICE — 6 FOOT DISPOSABLE EXTENSION CABLE WITH SAFE CONNECT / SCREW-DOWN

## (undated) DEVICE — SC 3W HP RA OFF NB - PG: Brand: NAMIC

## (undated) DEVICE — SYR 10ML LUER LOK 1/5ML GRAD --

## (undated) DEVICE — BAG SPEC REM 224ML W4XL6IN DIA10MM 1 HND GYN DISP ENDOPCH

## (undated) DEVICE — 3M™ TEGADERM™ TRANSPARENT FILM DRESSING FRAME STYLE, 1626W, 4 IN X 4-3/4 IN (10 CM X 12 CM), 50/CT 4CT/CASE: Brand: 3M™ TEGADERM™

## (undated) DEVICE — SUTURE PROL SZ 4-0 L30IN NONABSORBABLE BLU SH-1 L22MM 1/2 8526H

## (undated) DEVICE — CANNULA SUCT TIP L8MM DIA24FR INTCARD RIG SUC 0.25IN CONN

## (undated) DEVICE — DRAPE,REIN 53X77,STERILE: Brand: MEDLINE

## (undated) DEVICE — Device

## (undated) DEVICE — SUTURE NNBSRBBLE MNFLMNT 2X30 EV DBLE ARMD POLYPR PRLNE

## (undated) DEVICE — OPEN HEART B-RICHMOND: Brand: MEDLINE INDUSTRIES, INC.

## (undated) DEVICE — SYRINGE MED 30ML STD CLR PLAS LUERLOCK TIP N CTRL DISP

## (undated) DEVICE — GOWN,SIRUS,NONRNF,SETINSLV,XL,20/CS: Brand: MEDLINE

## (undated) DEVICE — ELECTRODE,RADIOTRANSLUCENT,FOAM,5PK: Brand: MEDLINE

## (undated) DEVICE — BANDAGE COMPR W6INXL10YD ST M E WHITE/BEIGE

## (undated) DEVICE — SURGIFOAM SPNG SZ 100

## (undated) DEVICE — CANNULA PERF 15FR L12.5IN RG STPCOCK WIREWOUND BODY

## (undated) DEVICE — Device: Brand: JELCO

## (undated) DEVICE — SET TUBE INSUFFLATION HI FLOW PNEUMOCLEAR

## (undated) DEVICE — CATHETER COR DIAG 4.0 5FR 100CM 0 SIDE H

## (undated) DEVICE — SYRINGE MED 20ML STD CLR PLAS LUERSLIP TIP N CTRL DISP

## (undated) DEVICE — GARMENT,MEDLINE,DVT,INT,CALF,MED, GEN2: Brand: MEDLINE

## (undated) DEVICE — PROVE COVER: Brand: UNBRANDED

## (undated) DEVICE — SYSTEM ENDOSCP VES HARV W/ TOOL CANN SEAL SHT PRT BLNT TIP

## (undated) DEVICE — COVER,TABLE,HEAVY DUTY,77"X90",STRL: Brand: MEDLINE

## (undated) DEVICE — SUTURE PROL SZ 4-0 L36IN NONABSORBABLE BLU L26MM SH 1/2 CIR 8521H

## (undated) DEVICE — ADHESIVE SKIN CLOSURE XL 42 CM 2.7 CC MESH LIQUIBAND SECUR

## (undated) DEVICE — EZ GLIDE AORTIC CANNULA: Brand: EDWARDS LIFESCIENCES EZ GLIDE AORTIC CANNULA

## (undated) DEVICE — 72" ARTERIAL PRESSURE TUBING: Brand: ICU MEDICAL

## (undated) DEVICE — GLOVE ORANGE PI 7 1/2   MSG9075

## (undated) DEVICE — TAPE SURG W4INXL10YD SFT CLTH H2O RESIST MEDIPORE H

## (undated) DEVICE — BLADE OPHTH 180DEG CUT SURF BLU STR SHRP DBL BVL GRINDLESS

## (undated) DEVICE — SUTURE NONABSORBABLE MONOFILAMENT 6-0 C-1 1X30 IN PROLENE 8706H

## (undated) DEVICE — TOWEL,OR,DSP,ST,BLUE,STD,2/PK,40PK/CS: Brand: MEDLINE

## (undated) DEVICE — 1200 GUARD II KIT W/5MM TUBE W/O VAC TUBE: Brand: GUARDIAN

## (undated) DEVICE — Device: Brand: CLEANCUT ROTATING AORTIC PUNCH, 4.5 MM

## (undated) DEVICE — MEDI-TRACE CADENCE ADULT, DEFIBRILLATION ELECTRODE -RTS  (10 PR/PK) - PHYSIO-CONTROL: Brand: MEDI-TRACE CADENCE

## (undated) DEVICE — SUTURE PROL SZ 7-0 L30IN NONABSORBABLE BLU L9.3MM BV-1 1/2 8703H

## (undated) DEVICE — HEART CATH-MRMC: Brand: MEDLINE INDUSTRIES, INC.

## (undated) DEVICE — SUTURE SZ 0 27IN 5/8 CIR UR-6  TAPER PT VIOLET ABSRB VICRYL J603H

## (undated) DEVICE — CANNULA PERF L2IN BLNT TIP 2MM VES CLR RADPQ BODY FEM LUER

## (undated) DEVICE — CANNULA PERF L55IN OD7FR AORT ROOT AG STD TIP FLOW GRD INTRO

## (undated) DEVICE — SYRINGE 20ML LL S/C 50

## (undated) DEVICE — SOLUTION IV 1000 ML 0.9 NACL INJ USP EXCEL PLAS CONTAINER

## (undated) DEVICE — TUBING SUCT 12FR MAL ALUM SHFT FN CAP VENT UNIV CONN W/ OBT

## (undated) DEVICE — SET ADMIN 16ML TBNG L100IN 2 Y INJ SITE IV PIGGY BK DISP

## (undated) DEVICE — CATHETER DIAG 5FR L100CM LUMN ID0.047IN JL3.5 CRV 0 SIDE H

## (undated) DEVICE — KIT ACCS INTRO 4FR L10CM NDL 21GA L7CM GWIRE L40CM

## (undated) DEVICE — TUBING PRSS MON L6IN PVC M FEM CONN

## (undated) DEVICE — FORCEPS BX L240CM JAW DIA2.8MM L CAP W/ NDL MIC MESH TOOTH

## (undated) DEVICE — GUIDEWIRE VASC L260CM 0.035IN J TIP L3MM PTFE FIX COR NAMIC

## (undated) DEVICE — STRAINER URIN CALC RNL MSH -- CONVERT TO ITEM 357634

## (undated) DEVICE — HYPODERMIC SAFETY NEEDLE: Brand: MAGELLAN

## (undated) DEVICE — DRAIN SURG SGL COLL PT TB FOR ATS BG OASIS

## (undated) DEVICE — KIT BLWR MISTER 5P 15L W/ TBNG SET IRRIG MIST TO IMPROVE

## (undated) DEVICE — TRANSFER BAG 300 ML: Brand: HAEMONETICS

## (undated) DEVICE — SUTURE VICRYL 3-0 L36IN ABSRB VLT CT-1 L36MM 1/2 CIR J344H

## (undated) DEVICE — AGENT HEMSTAT 3GM PURIFIED PLNT STARCH PWD ABSRB ARISTA AH

## (undated) DEVICE — CONTAINER,SPECIMEN,OR STERILE,4OZ: Brand: MEDLINE

## (undated) DEVICE — BAG SPEC BIOHZRD 10 X 10 IN --

## (undated) DEVICE — TR BAND RADIAL ARTERY COMPRESSION DEVICE: Brand: TR BAND

## (undated) DEVICE — GLOVE SURG SZ 8 L12IN FNGR THK79MIL GRN LTX FREE

## (undated) DEVICE — SET PERF L203CM 12IN RED AND BLU AORT ROOT MULT SLIP CONN

## (undated) DEVICE — SYR 3ML LL TIP 1/10ML GRAD --

## (undated) DEVICE — SOLIDIFIER FLD 2OZ 1500CC N DISINF IN BTL DISP SAFESORB

## (undated) DEVICE — NON-REM POLYHESIVE PATIENT RETURN ELECTRODE: Brand: VALLEYLAB

## (undated) DEVICE — TROCAR: Brand: KII® OPTICAL ACCESS SYSTEM

## (undated) DEVICE — GLOVE ORANGE PI 8   MSG9080

## (undated) DEVICE — ELECTRODE PT RET AD L9FT HI MOIST COND ADH HYDRGEL CORDED

## (undated) DEVICE — SNARE ENDOSCP M L240CM W27MM SHTH DIA2.4MM CHN 2.8MM OVL

## (undated) DEVICE — NEONATAL-ADULT SPO2 SENSOR: Brand: NELLCOR

## (undated) DEVICE — REM POLYHESIVE ADULT PATIENT RETURN ELECTRODE: Brand: VALLEYLAB

## (undated) DEVICE — KIT,ANTI FOG,W/SPONGE & FLUID,SOFT PACK: Brand: MEDLINE

## (undated) DEVICE — BLADE OPHTH KNF D3MM 15DEG CATRCT BLU MICRO-SHARP

## (undated) DEVICE — APPLICATOR SURG L38CM RIG ATOMIZING SGL USE XL-R FLEXITIP

## (undated) DEVICE — PMI OPERATIVE CHOLANGIOGRAM CATHETER; TUBING IS 76CM IN LENGTH, 16GA WITH A: Brand: PMI

## (undated) DEVICE — SUTURE MONOCRYL SZ 3-0 L27IN ABSRB UD L24MM PS-1 3/8 CIR PRIM Y936H

## (undated) DEVICE — SOLUTION IV 1000ML 140MEQ/L SOD 5MEQ/L K 3MEQ/L MG 27MEQ/L

## (undated) DEVICE — BASIN EMSIS 16OZ GRAPHITE PLAS KID SHP MOLD GRAD FOR ORAL

## (undated) DEVICE — OPEN HEART A- RICHMOND: Brand: MEDLINE INDUSTRIES, INC.